# Patient Record
Sex: FEMALE | Race: WHITE | Employment: UNEMPLOYED | URBAN - METROPOLITAN AREA
[De-identification: names, ages, dates, MRNs, and addresses within clinical notes are randomized per-mention and may not be internally consistent; named-entity substitution may affect disease eponyms.]

---

## 2023-02-28 ENCOUNTER — HOSPITAL ENCOUNTER (EMERGENCY)
Facility: HOSPITAL | Age: 43
Discharge: HOME | End: 2023-02-28
Attending: EMERGENCY MEDICINE

## 2023-02-28 ENCOUNTER — APPOINTMENT (EMERGENCY)
Dept: RADIOLOGY | Facility: HOSPITAL | Age: 43
End: 2023-02-28

## 2023-02-28 VITALS
RESPIRATION RATE: 20 BRPM | TEMPERATURE: 99 F | OXYGEN SATURATION: 91 % | SYSTOLIC BLOOD PRESSURE: 158 MMHG | HEART RATE: 107 BPM | DIASTOLIC BLOOD PRESSURE: 82 MMHG

## 2023-02-28 DIAGNOSIS — K52.9 GASTROENTERITIS: Primary | ICD-10-CM

## 2023-02-28 LAB
ALBUMIN SERPL BCP-MCNC: 3.4 G/DL (ref 3.5–5)
ALP SERPL-CCNC: 180 U/L (ref 46–116)
ALT SERPL W P-5'-P-CCNC: 32 U/L (ref 12–78)
ANION GAP SERPL CALCULATED.3IONS-SCNC: 5 MMOL/L (ref 4–13)
AST SERPL W P-5'-P-CCNC: 27 U/L (ref 5–45)
BACTERIA UR QL AUTO: ABNORMAL /HPF
BASOPHILS # BLD AUTO: 0.02 THOUSANDS/ÂΜL (ref 0–0.1)
BASOPHILS NFR BLD AUTO: 0 % (ref 0–1)
BILIRUB SERPL-MCNC: 0.87 MG/DL (ref 0.2–1)
BILIRUB UR QL STRIP: NEGATIVE
BUN SERPL-MCNC: 13 MG/DL (ref 5–25)
CALCIUM ALBUM COR SERPL-MCNC: 9.6 MG/DL (ref 8.3–10.1)
CALCIUM SERPL-MCNC: 9.1 MG/DL (ref 8.3–10.1)
CHLORIDE SERPL-SCNC: 105 MMOL/L (ref 96–108)
CLARITY UR: CLEAR
CO2 SERPL-SCNC: 26 MMOL/L (ref 21–32)
COLOR UR: YELLOW
CREAT SERPL-MCNC: 0.76 MG/DL (ref 0.6–1.3)
EOSINOPHIL # BLD AUTO: 0.05 THOUSAND/ÂΜL (ref 0–0.61)
EOSINOPHIL NFR BLD AUTO: 1 % (ref 0–6)
ERYTHROCYTE [DISTWIDTH] IN BLOOD BY AUTOMATED COUNT: 13.2 % (ref 11.6–15.1)
EXT PREGNANCY TEST URINE: NEGATIVE
EXT. CONTROL: NORMAL
GFR SERPL CREATININE-BSD FRML MDRD: 96 ML/MIN/1.73SQ M
GLUCOSE SERPL-MCNC: 155 MG/DL (ref 65–140)
GLUCOSE UR STRIP-MCNC: NEGATIVE MG/DL
HCT VFR BLD AUTO: 45.2 % (ref 34.8–46.1)
HGB BLD-MCNC: 14.7 G/DL (ref 11.5–15.4)
HGB UR QL STRIP.AUTO: NEGATIVE
IMM GRANULOCYTES # BLD AUTO: 0.02 THOUSAND/UL (ref 0–0.2)
IMM GRANULOCYTES NFR BLD AUTO: 0 % (ref 0–2)
KETONES UR STRIP-MCNC: NEGATIVE MG/DL
LEUKOCYTE ESTERASE UR QL STRIP: NEGATIVE
LIPASE SERPL-CCNC: 74 U/L (ref 73–393)
LYMPHOCYTES # BLD AUTO: 0.64 THOUSANDS/ÂΜL (ref 0.6–4.47)
LYMPHOCYTES NFR BLD AUTO: 8 % (ref 14–44)
MCH RBC QN AUTO: 28.1 PG (ref 26.8–34.3)
MCHC RBC AUTO-ENTMCNC: 32.5 G/DL (ref 31.4–37.4)
MCV RBC AUTO: 86 FL (ref 82–98)
MONOCYTES # BLD AUTO: 0.34 THOUSAND/ÂΜL (ref 0.17–1.22)
MONOCYTES NFR BLD AUTO: 4 % (ref 4–12)
NEUTROPHILS # BLD AUTO: 6.63 THOUSANDS/ÂΜL (ref 1.85–7.62)
NEUTS SEG NFR BLD AUTO: 87 % (ref 43–75)
NITRITE UR QL STRIP: NEGATIVE
NON-SQ EPI CELLS URNS QL MICRO: ABNORMAL /HPF
NRBC BLD AUTO-RTO: 0 /100 WBCS
PH UR STRIP.AUTO: 8 [PH]
PLATELET # BLD AUTO: 288 THOUSANDS/UL (ref 149–390)
PMV BLD AUTO: 9 FL (ref 8.9–12.7)
POTASSIUM SERPL-SCNC: 4.1 MMOL/L (ref 3.5–5.3)
PROT SERPL-MCNC: 7.5 G/DL (ref 6.4–8.4)
PROT UR STRIP-MCNC: ABNORMAL MG/DL
RBC # BLD AUTO: 5.24 MILLION/UL (ref 3.81–5.12)
RBC #/AREA URNS AUTO: ABNORMAL /HPF
SODIUM SERPL-SCNC: 136 MMOL/L (ref 135–147)
SP GR UR STRIP.AUTO: 1.02 (ref 1–1.03)
UROBILINOGEN UR STRIP-ACNC: <2 MG/DL
WBC # BLD AUTO: 7.7 THOUSAND/UL (ref 4.31–10.16)
WBC #/AREA URNS AUTO: ABNORMAL /HPF

## 2023-02-28 RX ORDER — HYDROMORPHONE HCL/PF 1 MG/ML
0.5 SYRINGE (ML) INJECTION ONCE
Status: COMPLETED | OUTPATIENT
Start: 2023-02-28 | End: 2023-02-28

## 2023-02-28 RX ORDER — ONDANSETRON 2 MG/ML
4 INJECTION INTRAMUSCULAR; INTRAVENOUS ONCE
Status: COMPLETED | OUTPATIENT
Start: 2023-02-28 | End: 2023-02-28

## 2023-02-28 RX ADMIN — HYDROMORPHONE HYDROCHLORIDE 0.5 MG: 1 INJECTION, SOLUTION INTRAMUSCULAR; INTRAVENOUS; SUBCUTANEOUS at 04:30

## 2023-02-28 RX ADMIN — ONDANSETRON 4 MG: 2 INJECTION INTRAMUSCULAR; INTRAVENOUS at 06:01

## 2023-02-28 RX ADMIN — SODIUM CHLORIDE 1000 ML: 0.9 INJECTION, SOLUTION INTRAVENOUS at 06:01

## 2023-02-28 RX ADMIN — IOHEXOL 100 ML: 350 INJECTION, SOLUTION INTRAVENOUS at 05:50

## 2023-02-28 RX ADMIN — ONDANSETRON 4 MG: 2 INJECTION INTRAMUSCULAR; INTRAVENOUS at 04:30

## 2023-02-28 NOTE — ED PROVIDER NOTES
History  Chief Complaint   Patient presents with   • Abdominal Pain     Pt reports abdominal pain/back pain that started yesterday  Pt also reports vomiting and burning when urinating  Pt has hx kidney stones and states it feels similar      Is a 59-year-old male with a past medical history of recurrent kidney infections presenting with pain that she says is like her past kidney infections  Noted burning with urination yesterday, that developed into abdominal pain, flank pain, and vomiting today  Patient denies any fevers  She is uncomfortable in the ED  She has had both kidney stones and kidney infections, and states that this pain is more consistent with her history of kidney infection  Not having any chest pain, shortness of breath, altered mental status, diarrhea  Prior to Admission Medications   Prescriptions Last Dose Informant Patient Reported? Taking?    Multiple Vitamin (MULTIVITAMIN) tablet   Yes No   Sig: Take 1 tablet by mouth daily   albuterol (PROVENTIL HFA,VENTOLIN HFA) 90 mcg/act inhaler   No No   Sig: Inhale 2 puffs every 4 (four) hours as needed for wheezing or shortness of breath   albuterol (PROVENTIL HFA,VENTOLIN HFA) 90 mcg/act inhaler   Yes No   Sig: Inhale 2 puffs every 4 (four) hours as needed   amitriptyline (ELAVIL) 150 MG tablet   Yes No   Sig: Take 150 mg by mouth every evening   multivitamin (THERAGRAN) TABS   Yes No   Sig: Take 1 tablet by mouth   ondansetron (ZOFRAN-ODT) 4 mg disintegrating tablet   No No   Sig: Take 1 tablet (4 mg total) by mouth every 6 (six) hours as needed for nausea or vomiting for up to 10 doses   rizatriptan (MAXALT) 5 MG tablet   Yes No   Sig: Take 5 mg by mouth daily as needed   zolpidem (AMBIEN) 5 mg tablet   Yes No   Sig: Take 5 mg by mouth daily at bedtime as needed      Facility-Administered Medications: None       Past Medical History:   Diagnosis Date   • Allergic    • Asthma     seasonal asthma   • Insomnia    • Migraine    • Neuromuscular disorder ([facility] [address] )     RLS with PLMD   • Restless leg syndrome        Past Surgical History:   Procedure Laterality Date   • ADENOIDECTOMY     •  SECTION     • CT EXCISION GANGLION WRIST DORSAL/VOLAR PRIMARY Right 2017    Procedure: WRIST EXCISION OF VOLAR GANGLION CYST ;  Surgeon: Acosta Castro MD;  Location: [facility];  Service: Orthopedics   • TONSILECTOMY AND ADNOIDECTOMY     • TONSILLECTOMY         History reviewed  No pertinent family history  I have reviewed and agree with the history as documented  E-Cigarette/Vaping     E-Cigarette/Vaping Substances     Social History     Tobacco Use   • Smoking status: Never   • Smokeless tobacco: Never   Substance Use Topics   • Alcohol use: No   • Drug use: No        Review of Systems   Constitutional: Negative for chills, fatigue and fever  HENT: Negative for rhinorrhea, sore throat and trouble swallowing  Eyes: Negative for discharge and visual disturbance  Respiratory: Negative for cough and shortness of breath  Cardiovascular: Negative for chest pain and palpitations  Gastrointestinal: Positive for abdominal pain, nausea and vomiting  Negative for constipation and diarrhea  Genitourinary: Positive for dysuria and flank pain  Negative for hematuria  Musculoskeletal: Negative for arthralgias and back pain  Skin: Negative for color change and rash  Neurological: Negative for seizures and syncope  All other systems reviewed and are negative        Physical Exam  ED Triage Vitals   Temperature Pulse Respirations Blood Pressure SpO2   23 0326 23 0326 23 0326 23 0326 23 032   99 °F (37 2 °C) (!) 107 20 158/82 91 %      Temp Source Heart Rate Source Patient Position - Orthostatic VS BP Location FiO2 (%)   23 0326 23 0326 23 0326 23 0326 --   Tympanic Monitor Sitting Right arm       Pain Score       23 0430       9             Orthostatic Vital Signs  Vitals:    23 BP: 158/82   Pulse: (!) 107   Patient Position - Orthostatic VS: Sitting       Physical Exam  Vitals and nursing note reviewed  Constitutional:       General: She is not in acute distress  Appearance: She is well-developed  HENT:      Head: Normocephalic and atraumatic  Eyes:      Conjunctiva/sclera: Conjunctivae normal    Cardiovascular:      Rate and Rhythm: Normal rate and regular rhythm  Heart sounds: No murmur heard  Pulmonary:      Effort: Pulmonary effort is normal  No respiratory distress  Breath sounds: Normal breath sounds  Abdominal:      Palpations: Abdomen is soft  Tenderness: There is abdominal tenderness in the suprapubic area  There is left CVA tenderness  Musculoskeletal:         General: No swelling  Cervical back: Neck supple  Skin:     General: Skin is warm and dry  Capillary Refill: Capillary refill takes less than 2 seconds  Neurological:      Mental Status: She is alert     Psychiatric:         Mood and Affect: Mood normal          ED Medications  Medications   sodium chloride 0 9 % bolus 1,000 mL (1,000 mL Intravenous New Bag 2/28/23 0601)   ondansetron (ZOFRAN) injection 4 mg (4 mg Intravenous Given 2/28/23 0430)   HYDROmorphone (DILAUDID) injection 0 5 mg (0 5 mg Intravenous Given 2/28/23 0430)   ondansetron (ZOFRAN) injection 4 mg (4 mg Intravenous Given 2/28/23 0601)   iohexol (OMNIPAQUE) 350 MG/ML injection (SINGLE-DOSE) 100 mL (100 mL Intravenous Given 2/28/23 0550)       Diagnostic Studies  Results Reviewed     Procedure Component Value Units Date/Time    Lipase [[id number]]  (Normal) Collected: 02/28/23 0429    Lab Status: Final result Specimen: Blood from Arm, Right Updated: 02/28/23 0522     Lipase 74 u/L     Comprehensive metabolic panel [[id number]]  (Abnormal) Collected: 02/28/23 0429    Lab Status: Final result Specimen: Blood from Arm, Right Updated: 02/28/23 0521     Sodium 136 mmol/L      Potassium 4 1 mmol/L      Chloride 105 mmol/L CO2 26 mmol/L      ANION GAP 5 mmol/L      BUN 13 mg/dL      Creatinine 0 76 mg/dL      Glucose 155 mg/dL      Calcium 9 1 mg/dL      Corrected Calcium 9 6 mg/dL      AST 27 U/L      ALT 32 U/L      Alkaline Phosphatase 180 U/L      Total Protein 7 5 g/dL      Albumin 3 4 g/dL      Total Bilirubin 0 87 mg/dL      eGFR 96 ml/min/1 73sq m     Narrative:      National Kidney Disease Foundation guidelines for Chronic Kidney Disease (CKD):   •  Stage 1 with normal or high GFR (GFR > 90 mL/min/1 73 square meters)  •  Stage 2 Mild CKD (GFR = 60-89 mL/min/1 73 square meters)  •  Stage 3A Moderate CKD (GFR = 45-59 mL/min/1 73 square meters)  •  Stage 3B Moderate CKD (GFR = 30-44 mL/min/1 73 square meters)  •  Stage 4 Severe CKD (GFR = 15-29 mL/min/1 73 square meters)  •  Stage 5 End Stage CKD (GFR <15 mL/min/1 73 square meters)  Note: GFR calculation is accurate only with a steady state creatinine    Urine Microscopic [[id number]]  (Abnormal) Collected: 02/28/23 0434    Lab Status: Final result Specimen: Urine, Clean Catch Updated: 02/28/23 0512     RBC, UA 1-2 /hpf      WBC, UA 2-4 /hpf      Epithelial Cells Occasional /hpf      Bacteria, UA None Seen /hpf     UA w Reflex to Microscopic w Reflex to Culture [[id number]]  (Abnormal) Collected: 02/28/23 0434    Lab Status: Final result Specimen: Urine, Clean Catch Updated: 02/28/23 0512     Color, UA Yellow     Clarity, UA Clear     Specific Gravity, UA 1 023     pH, UA 8 0     Leukocytes, UA Negative     Nitrite, UA Negative     Protein, UA Trace mg/dl      Glucose, UA Negative mg/dl      Ketones, UA Negative mg/dl      Urobilinogen, UA <2 0 mg/dl      Bilirubin, UA Negative     Occult Blood, UA Negative    CBC and differential [[id number]]  (Abnormal) Collected: 02/28/23 0429    Lab Status: Final result Specimen: Blood from Arm, Right Updated: 02/28/23 0438     WBC 7 70 Thousand/uL      RBC 5 24 Million/uL      Hemoglobin 14 7 g/dL      Hematocrit 45 2 %      MCV 86 fL MCH 28 1 pg      MCHC 32 5 g/dL      RDW 13 2 %      MPV 9 0 fL      Platelets 624 Thousands/uL      nRBC 0 /100 WBCs      Neutrophils Relative 87 %      Immat GRANS % 0 %      Lymphocytes Relative 8 %      Monocytes Relative 4 %      Eosinophils Relative 1 %      Basophils Relative 0 %      Neutrophils Absolute 6 63 Thousands/µL      Immature Grans Absolute 0 02 Thousand/uL      Lymphocytes Absolute 0 64 Thousands/µL      Monocytes Absolute 0 34 Thousand/µL      Eosinophils Absolute 0 05 Thousand/µL      Basophils Absolute 0 02 Thousands/µL     POCT pregnancy, urine [[id number]]  (Normal) Resulted: 02/28/23 0418    Lab Status: Final result Updated: 02/28/23 0418     EXT Preg Test, Ur Negative     Control Valid                 CT abdomen pelvis w contrast   Final Result by Yahir Abreu MD (02/28 0307)      No evidence of acute abdominopelvic process  3 mm right renal nonobstructing calculus  Additional chronic findings and negatives as above  Workstation performed: [id number]               Procedures  Procedures      ED Course                                       Medical Decision Making  Patient's history highly consistent with any infection  Patient's body habitus limits abdominal exam, but she is diffusely tender, worst suprapubically  Patient states that she has had multiple kidney infections in the past, not her pain today feels like a kidney infection pain  We will perform abdominal labs, urinalysis, and if endings are consistent with nephritis or UTI discharge patient on antibiotics  Findings inconsistent with nephritis or UTI, will consider CAT scan  Laboratory analysis returned consistent with UTI or pyelonephritis  Given patient's continued abdominal discomfort we will obtain a CAT scan to evaluate for the cause of her pain  CT scan returned unremarkable, as is the rest of her workup   Patient states that she feels better after zofran and dilaudid and would like to return home, and come back to the ED if her symptoms worsen  Encouraged follow up with urology and her PCP, and gave return precautions  Gastroenteritis: self-limited or minor problem  Amount and/or Complexity of Data Reviewed  Labs: ordered  Radiology: ordered  Risk  Prescription drug management  Disposition  Final diagnoses:   Gastroenteritis     Time reflects when diagnosis was documented in both MDM as applicable and the Disposition within this note     Time User Action Codes Description Comment    2/28/2023  6:18 AM Alexis Lira Add [K52 9] Gastroenteritis       ED Disposition     ED Disposition   Discharge    Condition   Stable    Date/Time   Tue Feb 28, 2023  6:18 AM    Comment   Luis Dangelo discharge to home/self care  Follow-up Information     Follow up With Specialties Details Why Contact Info Additional Information    Garrick Vasquez,  Internal Medicine   [address] [address] [address] [facility] Emergency Department Emergency Medicine Go to  If symptoms worsen [address] [id number]  [address] [place] [address] Emergency Department, [address] [place] [address], [id number] [phone]          Patient's Medications   Discharge Prescriptions    No medications on file     No discharge procedures on file  PDMP Review     None           ED Provider  Attending physically available and evaluated Luis Dangelo  I managed the patient along with the ED Attending      Electronically Signed by         Romain Chavez MD  02/28/23 5770

## 2023-02-28 NOTE — ED ATTENDING ATTESTATION
2/28/2023  ISadaf MD, saw and evaluated the patient  I have discussed the patient with the resident/non-physician practitioner and agree with the resident's/non-physician practitioner's findings, Plan of Care, and MDM as documented in the resident's/non-physician practitioner's note, except where noted  All available labs and Radiology studies were reviewed  I was present for key portions of any procedure(s) performed by the resident/non-physician practitioner and I was immediately available to provide assistance  At this point I agree with the current assessment done in the Emergency Department  I have conducted an independent evaluation of this patient a history and physical is as follows:    ED Course     Emergency Department Note- Ervin Smith 36 y o  female MRN: [id number]    Unit/Bed#: CRB Encounter: [id number]    Ervin Smith is a 36 y o  female who presents with   Chief Complaint   Patient presents with   • Abdominal Pain     Pt reports abdominal pain/back pain that started yesterday  Pt also reports vomiting and burning when urinating  Pt has hx kidney stones and states it feels similar          History of Present Illness   HPI:  Ervin Smith is a 36 y o  female who presents for evaluation of:  Dysuria and suprapubic discomfort that is typical of her urinary infections  Patient notes associated nausea and vomiting  The dysuria she describes as a burning sensation when she urinates  Patient does have a history of ureteral colic, however, her her symptoms tonight are more reminiscent of urinary infection  Review of Systems   Constitutional: Negative for fatigue and fever  HENT: Negative for congestion and sore throat  Respiratory: Negative for cough and shortness of breath  Cardiovascular: Negative for chest pain and palpitations  Gastrointestinal: Positive for abdominal pain and nausea  Genitourinary: Positive for dysuria and urgency   Negative for flank pain and frequency  Neurological: Negative for light-headedness and headaches  Psychiatric/Behavioral: Negative for dysphoric mood and hallucinations  All other systems reviewed and are negative  Historical Information   Past Medical History:   Diagnosis Date   • Allergic    • Asthma     seasonal asthma   • Insomnia    • Migraine    • Neuromuscular disorder (H[facility])     RLS with PLMD   • Restless leg syndrome      Past Surgical History:   Procedure Laterality Date   • ADENOIDECTOMY     •  SECTION     • [place] EXCISION GANGLION WRIST DORSAL/VOLAR PRIMARY Right 2017    Procedure: WRIST EXCISION OF VOLAR GANGLION CYST ;  Surgeon: Celestine Varner MD;  [facility];  Service: Orthopedics   • TONSILECTOMY AND ADNOIDECTOMY     • TONSILLECTOMY       Social History   Social History     Substance and Sexual Activity   Alcohol Use No     Social History     Substance and Sexual Activity   Drug Use No     Social History     Tobacco Use   Smoking Status Never   Smokeless Tobacco Never     Family History: History reviewed  No pertinent family history  Meds/Allergies   PTA meds:   Prior to Admission Medications   Prescriptions Last Dose Informant Patient Reported? Taking?    Multiple Vitamin (MULTIVITAMIN) tablet   Yes No   Sig: Take 1 tablet by mouth daily   albuterol (PROVENTIL HFA,VENTOLIN HFA) 90 mcg/act inhaler   No No   Sig: Inhale 2 puffs every 4 (four) hours as needed for wheezing or shortness of breath   albuterol (PROVENTIL HFA,VENTOLIN HFA) 90 mcg/act inhaler   Yes No   Sig: Inhale 2 puffs every 4 (four) hours as needed   amitriptyline (ELAVIL) 150 MG tablet   Yes No   Sig: Take 150 mg by mouth every evening   multivitamin (THERAGRAN) TABS   Yes No   Sig: Take 1 tablet by mouth   ondansetron (ZOFRAN-ODT) 4 mg disintegrating tablet   No No   Sig: Take 1 tablet (4 mg total) by mouth every 6 (six) hours as needed for nausea or vomiting for up to 10 doses   rizatriptan (MAXALT) 5 MG tablet   Yes No   Sig: Take 5 mg by mouth daily as needed   zolpidem (AMBIEN) 5 mg tablet   Yes No   Sig: Take 5 mg by mouth daily at bedtime as needed      Facility-Administered Medications: None     Allergies   Allergen Reactions   • Aspirin Hives     Other reaction(s): hives , shaking   • Ibuprofen Anaphylaxis   • Nsaids Anaphylaxis   • Penicillins Hives     Hives and high fever   • Reglan [Metoclopramide] Other (See Comments)     Patient reports she passes out and blood pressure raises severely within moments   • Robitussin (Alcohol Free) [Guaifenesin] GI Intolerance and Vomiting   • Other Rash     Strawberries cause rash  Aloe Vera - red, burning skin with blisters       Objective   First Vitals:   Blood Pressure: 158/82 (02/28/23 0326)  Pulse: (!) 107 (02/28/23 0326)  Temperature: 99 °F (37 2 °C) (02/28/23 0326)  Temp Source: Tympanic (02/28/23 0326)  Respirations: 20 (02/28/23 0326)  SpO2: 91 % (02/28/23 0326)    Current Vitals:   Blood Pressure: 158/82 (02/28/23 0326)  Pulse: (!) 107 (02/28/23 0326)  Temperature: 99 °F (37 2 °C) (02/28/23 0326)  Temp Source: Tympanic (02/28/23 0326)  Respirations: 20 (02/28/23 0326)  SpO2: 91 % (02/28/23 0326)    No intake or output data in the 24 hours ending 02/28/23 0420    Invasive Devices     None                 Physical Exam  Vitals and nursing note reviewed  Constitutional:       General: She is not in acute distress  Appearance: Normal appearance  She is well-developed  HENT:      Head: Normocephalic and atraumatic  Right Ear: External ear normal       Left Ear: External ear normal       Nose: Nose normal       Mouth/Throat:      Pharynx: No oropharyngeal exudate  Eyes:      Conjunctiva/sclera: Conjunctivae normal       Pupils: Pupils are equal, round, and reactive to light  Cardiovascular:      Rate and Rhythm: Normal rate and regular rhythm  Pulmonary:      Effort: Pulmonary effort is normal  No respiratory distress  Abdominal:      General: Abdomen is flat   There is no distension  Palpations: Abdomen is soft  Musculoskeletal:         General: No deformity  Normal range of motion  Cervical back: Normal range of motion and neck supple  Skin:     General: Skin is warm and dry  Capillary Refill: Capillary refill takes less than 2 seconds  Neurological:      General: No focal deficit present  Mental Status: She is alert and oriented to person, place, and time  Mental status is at baseline  Coordination: Coordination normal    Psychiatric:         Mood and Affect: Mood normal          Behavior: Behavior normal          Thought Content: Thought content normal          Judgment: Judgment normal            Medical Decision Makin  Acute dysuria: Urinalysis; urine hCG rule out pregnancy; pain control  Recent Results (from the past 36 hour(s))   POCT pregnancy, urine    Collection Time: 23  4:18 AM   Result Value Ref Range    EXT Preg Test, Ur Negative     Control Valid      No orders to display         Portions of the record may have been created with voice recognition software  Occasional wrong word or "sound a like" substitutions may have occurred due to the inherent limitations of voice recognition software  Read the chart carefully and recognize, using context, where substitutions have occurred          Critical Care Time  Procedures

## 2023-02-28 NOTE — DISCHARGE INSTRUCTIONS
You have been evaluated in the Emergency Department today for nausea and abdominal pain  Your evaluation, including CT of the abdomen, suggests that your symptoms are due to gastroenteritis  Please follow up with your primary care physician within two days  Return to the Emergency Department if you experience worsening of your symptoms  Thank you for choosing us for your care

## 2023-03-03 ENCOUNTER — OFFICE VISIT (OUTPATIENT)
Dept: INTERNAL MEDICINE CLINIC | Facility: CLINIC | Age: 43
End: 2023-03-03

## 2023-03-03 VITALS
SYSTOLIC BLOOD PRESSURE: 140 MMHG | HEART RATE: 100 BPM | WEIGHT: 293 LBS | TEMPERATURE: 97.5 F | HEIGHT: 63 IN | DIASTOLIC BLOOD PRESSURE: 82 MMHG | BODY MASS INDEX: 51.91 KG/M2 | RESPIRATION RATE: 18 BRPM | OXYGEN SATURATION: 96 %

## 2023-03-03 DIAGNOSIS — F43.10 PTSD (POST-TRAUMATIC STRESS DISORDER): ICD-10-CM

## 2023-03-03 DIAGNOSIS — G43.709 CHRONIC MIGRAINE WITHOUT AURA WITHOUT STATUS MIGRAINOSUS, NOT INTRACTABLE: ICD-10-CM

## 2023-03-03 DIAGNOSIS — E66.01 CLASS 3 SEVERE OBESITY DUE TO EXCESS CALORIES WITHOUT SERIOUS COMORBIDITY WITH BODY MASS INDEX (BMI) OF 50.0 TO 59.9 IN ADULT (HCC): ICD-10-CM

## 2023-03-03 DIAGNOSIS — R74.8 ELEVATED ALKALINE PHOSPHATASE LEVEL: Primary | ICD-10-CM

## 2023-03-03 DIAGNOSIS — R73.03 PREDIABETES: ICD-10-CM

## 2023-03-03 DIAGNOSIS — F41.9 ANXIETY AND DEPRESSION: ICD-10-CM

## 2023-03-03 DIAGNOSIS — F51.01 PRIMARY INSOMNIA: ICD-10-CM

## 2023-03-03 DIAGNOSIS — F32.A ANXIETY AND DEPRESSION: ICD-10-CM

## 2023-03-03 RX ORDER — AMITRIPTYLINE HYDROCHLORIDE 50 MG/1
75 TABLET, FILM COATED ORAL EVERY EVENING
Qty: 135 TABLET | Refills: 0 | Status: SHIPPED | OUTPATIENT
Start: 2023-03-03 | End: 2023-03-17

## 2023-03-03 RX ORDER — ZOLPIDEM TARTRATE 5 MG/1
5 TABLET ORAL
Qty: 30 TABLET | Refills: 0 | Status: SHIPPED | OUTPATIENT
Start: 2023-03-03
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
## 2023-03-05 PROBLEM — F41.9 ANXIETY AND DEPRESSION: Status: ACTIVE | Noted: 2023-03-05

## 2023-03-05 PROBLEM — R73.03 PREDIABETES: Status: ACTIVE | Noted: 2023-03-05

## 2023-03-05 PROBLEM — F32.A ANXIETY AND DEPRESSION: Status: ACTIVE | Noted: 2023-03-05

## 2023-03-05 PROBLEM — R74.8 ELEVATED ALKALINE PHOSPHATASE LEVEL: Status: ACTIVE | Noted: 2023-03-05

## 2023-03-05 PROBLEM — E66.01 CLASS 3 SEVERE OBESITY DUE TO EXCESS CALORIES WITHOUT SERIOUS COMORBIDITY WITH BODY MASS INDEX (BMI) OF 50.0 TO 59.9 IN ADULT (HCC): Status: ACTIVE | Noted: 2023-03-05

## 2023-03-05 PROBLEM — E66.813 CLASS 3 SEVERE OBESITY DUE TO EXCESS CALORIES WITHOUT SERIOUS COMORBIDITY WITH BODY MASS INDEX (BMI) OF 50.0 TO 59.9 IN ADULT (HCC): Status: ACTIVE | Noted: 2023-03-05

## 2023-03-05 PROBLEM — F51.01 PRIMARY INSOMNIA: Status: ACTIVE | Noted: 2023-03-05

## 2023-03-05 NOTE — ASSESSMENT & PLAN NOTE
Maintained on amitriptyline, as needed Ambien  She has been trying to wean down her dose of Ambien as tolerated and is currently using maybe 2 or 3 times a week    She would like to continue to wean which I believe is a good idea  -We will increase amitriptyline to 75 mg at bedtime, advised patient to try to continue to wean Ambien as tolerated

## 2023-03-05 NOTE — ASSESSMENT & PLAN NOTE
Chronic, mild elevation in alkaline phosphatase  Transaminases, bilirubin within normal limits     Right upper quadrant ultrasound completed November 2022 -no pathology seen, no cholelithiasis, enlarged echodense liver compatible with fatty infiltration, no significant ductal dilation seen    Cause of this is unclear, we will continue to periodically monitor
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
## 2023-03-05 NOTE — ASSESSMENT & PLAN NOTE
A1c 6 0 on most recent set of labs February 2023, improved from prior  We will continue to monitor    Healthy dietary and lifestyle choices are encouraged

## 2023-03-05 NOTE — ASSESSMENT & PLAN NOTE
Migraine frequency has reduced over time, currently occur 2 or 3 times a month  She has prescription for Maxalt as abortive  She is on amitriptyline as a prophylactic therapy    She seems satisfied with her current degree of migraine control

## 2023-03-06 ENCOUNTER — OFFICE VISIT (OUTPATIENT)
Dept: INTERNAL MEDICINE CLINIC | Facility: CLINIC | Age: 43
End: 2023-03-06

## 2023-03-06 VITALS
HEIGHT: 63 IN | BODY MASS INDEX: 51.91 KG/M2 | TEMPERATURE: 98.5 F | OXYGEN SATURATION: 96 % | DIASTOLIC BLOOD PRESSURE: 86 MMHG | SYSTOLIC BLOOD PRESSURE: 136 MMHG | RESPIRATION RATE: 20 BRPM | HEART RATE: 60 BPM | WEIGHT: 293 LBS

## 2023-03-06 DIAGNOSIS — H60.502 ACUTE OTITIS EXTERNA OF LEFT EAR, UNSPECIFIED TYPE: Primary | ICD-10-CM

## 2023-03-06 RX ORDER — CIPROFLOXACIN AND DEXAMETHASONE 3; 1 MG/ML; MG/ML
4 SUSPENSION/ DROPS AURICULAR (OTIC) 2 TIMES DAILY
Qty: 7.5 ML | Refills: 0 | Status: SHIPPED | OUTPATIENT
Start: 2023-03-06 | End: 2023-03-13

## 2023-03-06 RX ORDER — AMITRIPTYLINE HYDROCHLORIDE 75 MG/1
75 TABLET, FILM COATED ORAL EVERY EVENING
COMMUNITY
Start: 2023-03-03 | End: 2023-03-17

## 2023-03-06 NOTE — PROGRESS NOTES
INTERNAL MEDICINE OFFICE VISIT  [facility] Internal Medicine- [place]    NAME: Dimple Beckwith  AGE: 36 y o  SEX: female    DATE OF ENCOUNTER: 3/6/2023    Assessment and Plan/History of Present Illness     Here today for same-day appointment for ear pain  Medical history of hypertension, anxiety/depression/PTSD, migraine, prediabetes    Approximately 2-day history of left-sided ear pain  Issa/pounding in quality  She has associated muffling of her hearing  She states she had a similar episode approximately 2 weeks ago  She has noticed some wax coming out of the ear but no otorrhea or bleeding  It "feels like an ocean" in her ear  She denies any associated dizziness or balance issues  No coughing, sinus congestion, or other upper respiratory issues    On exam, she has some mild tenderness to palpation with manipulation of the pinna and pressing of the tragus  Small amount of wax in the 12 o'clock position, there is some erythema of the canal, no obvious effusion or evidence of otitis media on exam   No erythema of the mastoid area    1  Acute otitis externa of left ear, unspecified type  -We will treat for possible otitis externa with course of Ciprodex  Advised her to let me know if her symptoms do not improve    - ciprofloxacin-dexamethasone (CIPRODEX) otic suspension; Administer 4 drops into the left ear 2 (two) times a day for 7 days  Dispense: 7 5 mL; Refill: 0          No orders of the defined types were placed in this encounter        Chief Complaint     Chief Complaint   Patient presents with   • Earache     Left ear pain, started 2 days ago, pt now experiences muffled sound, pt took tylenol which did not help        Review of Systems     10 point ROS negative except per HPI    The following portions of the patient's history were reviewed and updated as appropriate: allergies, current medications, past family history, past medical history, past social history, past surgical history and problem list     Active Problem List     Patient Active Problem List   Diagnosis   • Acute sinusitis   • Acute bacterial conjunctivitis of both eyes   • Anxiety and depression   • Primary insomnia   • Prediabetes   • Elevated alkaline phosphatase level   • Class 3 severe obesity due to excess calories without serious comorbidity with body mass index (BMI) of 50 0 to 59 9 in adult [facility])   • Migraine   • PTSD (post-traumatic stress disorder)       Objective     /86 (BP Location: Left arm, Patient Position: Sitting, Cuff Size: Large)   Pulse 60   Temp 98 5 °F (36 9 °C)   Resp 20   Ht 5' 3" (1 6 m)   Wt (!) 141 kg (311 lb)   LMP  (LMP Unknown) Comment: irregular periods  SpO2 96%   BMI 55 09 kg/m²     Physical Exam  Constitutional:       Appearance: Normal appearance  She is not ill-appearing  HENT:      Head: Normocephalic and atraumatic  Eyes:      General: No scleral icterus  Right eye: No discharge  Left eye: No discharge  Cardiovascular:      Rate and Rhythm: Normal rate and regular rhythm  Heart sounds: No murmur heard  No friction rub  Pulmonary:      Effort: Pulmonary effort is normal       Breath sounds: Normal breath sounds  No wheezing or rales  Abdominal:      General: Abdomen is flat  There is no distension  Palpations: Abdomen is soft  Tenderness: There is no abdominal tenderness  Musculoskeletal:         General: No swelling or tenderness  Skin:     General: Skin is warm and dry  Findings: No erythema  Neurological:      Mental Status: She is alert  Mental status is at baseline  Motor: No weakness  Psychiatric:         Mood and Affect: Mood normal          Behavior: Behavior normal          Pertinent Laboratory/Diagnostic Studies:  CT abdomen pelvis w contrast    Result Date: 2/28/2023  Impression: No evidence of acute abdominopelvic process  3 mm right renal nonobstructing calculus  Additional chronic findings and negatives as above  Workstation performed: [id number]       Images and diagnostics reviewed     Current Medications     Current Outpatient Medications:   •  albuterol (PROVENTIL HFA,VENTOLIN HFA) 90 mcg/act inhaler, Inhale 2 puffs every 4 (four) hours as needed, Disp: , Rfl:   •  amitriptyline (ELAVIL) 75 mg tablet, Take 75 mg by mouth every evening, Disp: , Rfl:   •  ciprofloxacin-dexamethasone (CIPRODEX) otic suspension, Administer 4 drops into the left ear 2 (two) times a day for 7 days, Disp: 7 5 mL, Rfl: 0  •  Multiple Vitamin (MULTIVITAMIN) tablet, Take 1 tablet by mouth daily, Disp: , Rfl:   •  multivitamin (THERAGRAN) TABS, Take 1 tablet by mouth, Disp: , Rfl:   •  ondansetron (ZOFRAN-ODT) 4 mg disintegrating tablet, Take 1 tablet (4 mg total) by mouth every 6 (six) hours as needed for nausea or vomiting for up to 10 doses, Disp: 10 tablet, Rfl: 0  •  rizatriptan (MAXALT) 5 MG tablet, Take 5 mg by mouth daily as needed, Disp: , Rfl:   •  zolpidem (AMBIEN) 5 mg tablet, Take 1 tablet (5 mg total) by mouth daily at bedtime as needed for sleep, Disp: 30 tablet, Rfl: 0  •  amitriptyline (ELAVIL) 50 mg tablet, Take 1 5 tablets (75 mg total) by mouth every evening (Patient not taking: Reported on 3/6/2023), Disp: 135 tablet, Rfl: 0    Health Maintenance     Health Maintenance   Topic Date Due   • Hepatitis C Screening  Never done   • COVID-19 Vaccine (1) Never done   • HIV Screening  Never done   • Annual Physical  Never done   • Cervical Cancer Screening  Never done   • Breast Cancer Screening: Mammogram  Never done   • Influenza Vaccine (1) 06/30/2023 (Originally 9/1/2022)   • BMI: Followup Plan  03/03/2024   • BMI: Adult  03/03/2024   • DTaP,Tdap,and Td Vaccines (2 - Td or Tdap) 12/25/2024   • Pneumococcal Vaccine: Pediatrics (0 to 5 Years) and At-Risk Patients (6 to 59 Years)  Aged Out   • HIB Vaccine  Aged Out   • IPV Vaccine  Aged Out   • Hepatitis A Vaccine  Aged Out   • Meningococcal ACWY Vaccine  Aged Out   • HPV Vaccine Aged [employer] History   Administered Date(s) Administered   • INFLUENZA 04/29/2020, 12/07/2021   • Tdap 12/25/2014       CHERELLE Jules [facility] Internal Medicine [place] [address] [address], [facility] [address]  [place], [address] [facility]  Office: [phone]  Fax: [phone]

## 2023-03-17 ENCOUNTER — OFFICE VISIT (OUTPATIENT)
Dept: INTERNAL MEDICINE CLINIC | Facility: CLINIC | Age: 43
End: 2023-03-17

## 2023-03-17 VITALS
WEIGHT: 293 LBS | BODY MASS INDEX: 51.91 KG/M2 | TEMPERATURE: 97.6 F | HEIGHT: 63 IN | DIASTOLIC BLOOD PRESSURE: 98 MMHG | RESPIRATION RATE: 13 BRPM | HEART RATE: 86 BPM | SYSTOLIC BLOOD PRESSURE: 140 MMHG

## 2023-03-17 DIAGNOSIS — F41.9 ACUTE ANXIETY: ICD-10-CM

## 2023-03-17 DIAGNOSIS — F41.9 ANXIETY AND DEPRESSION: ICD-10-CM

## 2023-03-17 DIAGNOSIS — F51.01 PRIMARY INSOMNIA: Primary | ICD-10-CM

## 2023-03-17 DIAGNOSIS — F32.A ANXIETY AND DEPRESSION: ICD-10-CM

## 2023-03-17 DIAGNOSIS — I10 ESSENTIAL HYPERTENSION: ICD-10-CM

## 2023-03-17 RX ORDER — AMITRIPTYLINE HYDROCHLORIDE 75 MG/1
75 TABLET, FILM COATED ORAL
Qty: 90 TABLET | Refills: 1 | Status: SHIPPED | OUTPATIENT
Start: 2023-03-17

## 2023-03-17 RX ORDER — OLMESARTAN MEDOXOMIL 5 MG/1
5 TABLET ORAL DAILY
Qty: 90 TABLET | Refills: 0 | Status: SHIPPED | OUTPATIENT
Start: 2023-03-17

## 2023-03-17 RX ORDER — LORAZEPAM 0.5 MG/1
0.5 TABLET ORAL DAILY PRN
Qty: 2 TABLET | Refills: 0 | Status: SHIPPED | OUTPATIENT
Start: 2023-03-17

## 2023-03-17 NOTE — ASSESSMENT & PLAN NOTE
-Seems to have had satisfactory response with increase in amitriptyline dose to 75 mg  She has only required Ambien once since our last appointment  -Continue amitriptyline    Recommend Ambien to be used sparingly as needed moving forward

## 2023-03-17 NOTE — ASSESSMENT & PLAN NOTE
Patient has significant anxiety with blood draws  Has been premedicated in the past   We will send prescription for lorazepam 0 5 mg to be taken 30 to 60 minutes prior to lab draw    Patient's spouse will drive to lab

## 2023-03-17 NOTE — ASSESSMENT & PLAN NOTE
Appears patient may have been on lisinopril per chart review  She has been tracking BP at home  Systolic BP generally ranging from the high 130s with a few readings in the 140s to 150s  Diastolic readings ranging from the 50s to 80s generally  -We will start low-dose Benicar 5 mg daily    Recheck blood work in approximately 1 month prior to our next follow-up visit

## 2023-03-17 NOTE — PROGRESS NOTES
INTERNAL MEDICINE OFFICE VISIT  [facility] Internal Medicine- [place]    NAME: Bianca Barajas  AGE: 39 y o  SEX: female    DATE OF ENCOUNTER: 3/17/2023    Assessment and Plan/History of Present Illness     Here today for 2-week follow-up  Medical history of hypertension, anxiety/depression/PTSD, insomnia, migraine, prediabetes    1  Primary insomnia  Assessment & Plan:  -Seems to have had satisfactory response with increase in amitriptyline dose to 75 mg  She has only required Ambien once since our last appointment  -Continue amitriptyline  Recommend Ambien to be used sparingly as needed moving forward    Orders:  -     amitriptyline (ELAVIL) 75 mg tablet; Take 1 tablet (75 mg total) by mouth daily at bedtime    2  Anxiety and depression  Assessment & Plan:  Continue amitriptyline 75 mg daily at bedtime      Orders:  -     amitriptyline (ELAVIL) 75 mg tablet; Take 1 tablet (75 mg total) by mouth daily at bedtime    3  Essential hypertension  Assessment & Plan:  Appears patient may have been on lisinopril per chart review  She has been tracking BP at home  Systolic BP generally ranging from the high 130s with a few readings in the 140s to 150s  Diastolic readings ranging from the 50s to 80s generally  -We will start low-dose Benicar 5 mg daily  Recheck blood work in approximately 1 month prior to our next follow-up visit    Orders:  -     olmesartan (BENICAR) 5 mg tablet; Take 1 tablet (5 mg total) by mouth daily    4  Acute anxiety  Assessment & Plan:  Patient has significant anxiety with blood draws  Has been premedicated in the past   We will send prescription for lorazepam 0 5 mg to be taken 30 to 60 minutes prior to lab draw  Patient's spouse will drive to lab    Orders:  -     LORazepam (Ativan) 0 5 mg tablet;  Take 1 tablet (0 5 mg total) by mouth daily as needed for anxiety Take 1 tablet 30 to 60 minutes prior to lab draw               No orders of the defined types were placed in this encounter  Chief Complaint     Chief Complaint   Patient presents with   • Follow-up     Needs pap (ordered)  Appt for mammo next week       Review of Systems     10 point ROS negative except per HPI    The following portions of the patient's history were reviewed and updated as appropriate: allergies, current medications, past family history, past medical history, past social history, past surgical history and problem list     Active Problem List     Patient Active Problem List   Diagnosis   • Acute sinusitis   • Acute bacterial conjunctivitis of both eyes   • Anxiety and depression   • Primary insomnia   • Prediabetes   • Elevated alkaline phosphatase level   • Class 3 severe obesity due to excess calories without serious comorbidity with body mass index (BMI) of 50 0 to 59 9 in adult [facility])   • Migraine   • PTSD (post-traumatic stress disorder)   • Essential hypertension   • Acute anxiety       Objective     /98 (BP Location: Left arm, Patient Position: Sitting, Cuff Size: Large)   Pulse 86   Temp 97 6 °F (36 4 °C)   Resp 13   Ht 5' 3" (1 6 m)   Wt (!) 142 kg (313 lb)   LMP  (LMP Unknown) Comment: irregular periods  BMI 55 45 kg/m²     Physical Exam  Constitutional:       Appearance: Normal appearance  She is not ill-appearing  HENT:      Head: Normocephalic and atraumatic  Eyes:      General: No scleral icterus  Right eye: No discharge  Left eye: No discharge  Cardiovascular:      Rate and Rhythm: Normal rate and regular rhythm  Heart sounds: No murmur heard  No friction rub  Pulmonary:      Effort: Pulmonary effort is normal       Breath sounds: Normal breath sounds  No wheezing or rales  Abdominal:      General: Abdomen is flat  There is no distension  Palpations: Abdomen is soft  Tenderness: There is no abdominal tenderness  Musculoskeletal:         General: No swelling or tenderness  Skin:     General: Skin is warm and dry        Findings: No erythema  Neurological:      Mental Status: She is alert  Mental status is at baseline  Motor: No weakness  Psychiatric:         Mood and Affect: Mood normal          Behavior: Behavior normal          Pertinent Laboratory/Diagnostic Studies:  CT abdomen pelvis w contrast    Result Date: 2/28/2023  Impression: No evidence of acute abdominopelvic process  3 mm right renal nonobstructing calculus  Additional chronic findings and negatives as above   Workstation performed: [id number]       Images and diagnostics reviewed     Current Medications     Current Outpatient Medications:   •  albuterol (PROVENTIL HFA,VENTOLIN HFA) 90 mcg/act inhaler, Inhale 2 puffs every 4 (four) hours as needed, Disp: , Rfl:   •  amitriptyline (ELAVIL) 75 mg tablet, Take 1 tablet (75 mg total) by mouth daily at bedtime, Disp: 90 tablet, Rfl: 1  •  LORazepam (Ativan) 0 5 mg tablet, Take 1 tablet (0 5 mg total) by mouth daily as needed for anxiety Take 1 tablet 30 to 60 minutes prior to lab draw, Disp: 2 tablet, Rfl: 0  •  Multiple Vitamin (MULTIVITAMIN) tablet, Take 1 tablet by mouth daily, Disp: , Rfl:   •  multivitamin (THERAGRAN) TABS, Take 1 tablet by mouth, Disp: , Rfl:   •  olmesartan (BENICAR) 5 mg tablet, Take 1 tablet (5 mg total) by mouth daily, Disp: 90 tablet, Rfl: 0  •  ondansetron (ZOFRAN-ODT) 4 mg disintegrating tablet, Take 1 tablet (4 mg total) by mouth every 6 (six) hours as needed for nausea or vomiting for up to 10 doses, Disp: 10 tablet, Rfl: 0  •  ciprofloxacin-dexamethasone (CIPRODEX) otic suspension, Administer 4 drops into the left ear 2 (two) times a day for 7 days, Disp: 7 5 mL, Rfl: 0  •  rizatriptan (MAXALT) 5 MG tablet, Take 5 mg by mouth daily as needed, Disp: , Rfl:   •  zolpidem (AMBIEN) 5 mg tablet, Take 1 tablet (5 mg total) by mouth daily at bedtime as needed for sleep, Disp: 30 tablet, Rfl: 0    Health Maintenance     Health Maintenance   Topic Date Due   • Hepatitis C Screening  Never done   • COVID-19 Vaccine (1) Never done   • HIV Screening  Never done   • Annual Physical  Never done   • Cervical Cancer Screening  Never done   • Breast Cancer Screening: Mammogram  Never done   • Influenza Vaccine (1) 06/30/2023 (Originally 9/1/2022)   • BMI: Followup Plan  03/03/2024   • BMI: Adult  03/17/2024   • DTaP,Tdap,and Td Vaccines (2 - Td or Tdap) 12/25/2024   • Pneumococcal Vaccine: Pediatrics (0 to 5 Years) and At-Risk Patients (6 to 59 Years)  Aged Out   • HIB Vaccine  Aged Out   • IPV Vaccine  Aged Out   • Hepatitis A Vaccine  Aged Out   • Meningococcal ACWY Vaccine  Aged Out   • HPV Vaccine  Aged [employer] History   Administered Date(s) Administered   • INFLUENZA 04/29/2020, 12/07/2021   • Tdap 12/25/2014       CHERELLE Modi  Internal Medicine [address], [facility] [address]  [place], [address] [facility]  Office: [phone]  Fax: [phone]

## 2023-03-31 ENCOUNTER — APPOINTMENT (OUTPATIENT)
Dept: LAB | Facility: HOSPITAL | Age: 43
End: 2023-03-31

## 2023-03-31 DIAGNOSIS — R73.03 PREDIABETES: ICD-10-CM

## 2023-03-31 DIAGNOSIS — E66.01 CLASS 3 SEVERE OBESITY DUE TO EXCESS CALORIES WITHOUT SERIOUS COMORBIDITY WITH BODY MASS INDEX (BMI) OF 50.0 TO 59.9 IN ADULT (HCC): ICD-10-CM

## 2023-03-31 DIAGNOSIS — R74.8 ELEVATED ALKALINE PHOSPHATASE LEVEL: ICD-10-CM

## 2023-03-31 LAB
ALBUMIN SERPL BCP-MCNC: 4.1 G/DL (ref 3.5–5)
ALP SERPL-CCNC: 169 U/L (ref 34–104)
ALT SERPL W P-5'-P-CCNC: 22 U/L (ref 7–52)
ANION GAP SERPL CALCULATED.3IONS-SCNC: 9 MMOL/L (ref 4–13)
AST SERPL W P-5'-P-CCNC: 17 U/L (ref 13–39)
BASOPHILS # BLD AUTO: 0.04 THOUSANDS/ÂΜL (ref 0–0.1)
BASOPHILS NFR BLD AUTO: 1 % (ref 0–1)
BILIRUB DIRECT SERPL-MCNC: 0.15 MG/DL (ref 0–0.2)
BILIRUB SERPL-MCNC: 1.06 MG/DL (ref 0.2–1)
BUN SERPL-MCNC: 9 MG/DL (ref 5–25)
CALCIUM SERPL-MCNC: 9.4 MG/DL (ref 8.4–10.2)
CHLORIDE SERPL-SCNC: 102 MMOL/L (ref 96–108)
CHOLEST SERPL-MCNC: 182 MG/DL
CO2 SERPL-SCNC: 27 MMOL/L (ref 21–32)
CREAT SERPL-MCNC: 0.56 MG/DL (ref 0.6–1.3)
EOSINOPHIL # BLD AUTO: 0.18 THOUSAND/ÂΜL (ref 0–0.61)
EOSINOPHIL NFR BLD AUTO: 2 % (ref 0–6)
ERYTHROCYTE [DISTWIDTH] IN BLOOD BY AUTOMATED COUNT: 13.4 % (ref 11.6–15.1)
GFR SERPL CREATININE-BSD FRML MDRD: 113 ML/MIN/1.73SQ M
GGT SERPL-CCNC: 56 U/L (ref 5–85)
GLUCOSE P FAST SERPL-MCNC: 120 MG/DL (ref 65–99)
HCT VFR BLD AUTO: 46.3 % (ref 34.8–46.1)
HDLC SERPL-MCNC: 48 MG/DL
HGB BLD-MCNC: 14.7 G/DL (ref 11.5–15.4)
IMM GRANULOCYTES # BLD AUTO: 0.05 THOUSAND/UL (ref 0–0.2)
IMM GRANULOCYTES NFR BLD AUTO: 1 % (ref 0–2)
LDLC SERPL CALC-MCNC: 115 MG/DL (ref 0–100)
LYMPHOCYTES # BLD AUTO: 2.07 THOUSANDS/ÂΜL (ref 0.6–4.47)
LYMPHOCYTES NFR BLD AUTO: 25 % (ref 14–44)
MCH RBC QN AUTO: 27.8 PG (ref 26.8–34.3)
MCHC RBC AUTO-ENTMCNC: 31.7 G/DL (ref 31.4–37.4)
MCV RBC AUTO: 88 FL (ref 82–98)
MONOCYTES # BLD AUTO: 0.48 THOUSAND/ÂΜL (ref 0.17–1.22)
MONOCYTES NFR BLD AUTO: 6 % (ref 4–12)
NEUTROPHILS # BLD AUTO: 5.45 THOUSANDS/ÂΜL (ref 1.85–7.62)
NEUTS SEG NFR BLD AUTO: 65 % (ref 43–75)
NONHDLC SERPL-MCNC: 134 MG/DL
NRBC BLD AUTO-RTO: 0 /100 WBCS
PLATELET # BLD AUTO: 294 THOUSANDS/UL (ref 149–390)
PMV BLD AUTO: 9 FL (ref 8.9–12.7)
POTASSIUM SERPL-SCNC: 4.1 MMOL/L (ref 3.5–5.3)
PROT SERPL-MCNC: 7.7 G/DL (ref 6.4–8.4)
RBC # BLD AUTO: 5.28 MILLION/UL (ref 3.81–5.12)
SODIUM SERPL-SCNC: 138 MMOL/L (ref 135–147)
TRIGL SERPL-MCNC: 93 MG/DL
TSH SERPL DL<=0.05 MIU/L-ACNC: 2.25 UIU/ML (ref 0.45–4.5)
WBC # BLD AUTO: 8.27 THOUSAND/UL (ref 4.31–10.16)

## 2023-04-02 LAB
EST. AVERAGE GLUCOSE BLD GHB EST-MCNC: 126 MG/DL
HBA1C MFR BLD: 6 %

## 2023-04-05 ENCOUNTER — OFFICE VISIT (OUTPATIENT)
Dept: INTERNAL MEDICINE CLINIC | Facility: CLINIC | Age: 43
End: 2023-04-05

## 2023-04-05 VITALS
HEIGHT: 63 IN | DIASTOLIC BLOOD PRESSURE: 82 MMHG | OXYGEN SATURATION: 98 % | HEART RATE: 76 BPM | SYSTOLIC BLOOD PRESSURE: 130 MMHG | BODY MASS INDEX: 51.91 KG/M2 | TEMPERATURE: 97 F | WEIGHT: 293 LBS

## 2023-04-05 DIAGNOSIS — M79.674 CHRONIC TOE PAIN, RIGHT FOOT: ICD-10-CM

## 2023-04-05 DIAGNOSIS — G89.29 CHRONIC TOE PAIN, RIGHT FOOT: ICD-10-CM

## 2023-04-05 DIAGNOSIS — I10 ESSENTIAL HYPERTENSION: ICD-10-CM

## 2023-04-05 DIAGNOSIS — Z00.00 ANNUAL PHYSICAL EXAM: Primary | ICD-10-CM

## 2023-04-05 RX ORDER — HYDROXYZINE HYDROCHLORIDE 25 MG/1
TABLET, FILM COATED ORAL
COMMUNITY
Start: 2023-04-02

## 2023-04-05 NOTE — PROGRESS NOTES
[facility]  INTERNAL MEDICINE [place]    NAME: Celina Rose  AGE: 39 y o  SEX: female  : 1980     DATE: 2023     Assessment and Plan:     Here today for annual physical  Medical history of hypertension, anxiety/depression/PTSD, insomnia, migraine, prediabetes    Prior mammogram in 2021  Discussed need for follow-up mammogram in 1 to 2 years  I think 2-year interval would be appropriate, we will readdress this at our next appointment      Problem List Items Addressed This Visit        Cardiovascular and Mediastinum    Essential hypertension     -Benicar 5 mg daily was started at our prior appointment  BP is acceptable today, she has not gotten any home readings  I suggested she obtain some home readings outside of the office to make sure BP is well controlled at home              Other    Chronic toe pain, right foot     Chronic pain in the right second and third toes  Patient states she fractured these toes previously  She does have slight lateral deviation of the third toe  Tenderness to palpation on the dorsal surface of the foot at the base of the second and third toes  She denies any recent trauma  She has tried orthotic inserts  -We will check x-rays to evaluate for underlying arthritis and new fracture         Relevant Orders    XR foot 3+ vw right   Other Visit Diagnoses     Annual physical exam    -  Primary          Immunizations and preventive care screenings were discussed with patient today  Appropriate education was printed on patient's after visit summary  Return in about 6 months (around 10/5/2023)       Chief Complaint:     Chief Complaint   Patient presents with   • Physical Exam     39year old female   • Results     Labs 3/31/23      History of Present Illness:     Adult Annual Physical       Depression Screening  PHQ-2/9 Depression Screening         General Health  Sleep:trouble falling asleep, "overall improved    Hearing: normal - bilateral   Vision: wears glasses  Dental: due for follow  \"Needs to be knocked out\"       /GYN Health  Denies any issues today  Does not follow with gynecology  Denies any family history of breast, ovarian, uterine, cervical cancer      Review of Systems:     Review of Systems   Past Medical History:     Past Medical History:   Diagnosis Date   • Allergic    • Asthma     seasonal asthma   • Essential hypertension 3/17/2023   • Insomnia    • Migraine    • Neuromuscular disorder (HCC)     RLS with PLMD   • Restless leg syndrome       Past Surgical History:     Past Surgical History:   Procedure Laterality Date   • ADENOIDECTOMY     •  SECTION     • WA EXCISION GANGLION WRIST DORSAL/VOLAR PRIMARY Right 2017    Procedure: WRIST EXCISION OF VOLAR GANGLION CYST ;  Surgeon: Krystyna Pascal MD;  Location: [facility];  Service: Orthopedics   • TONSILECTOMY AND ADNOIDECTOMY     • TONSILLECTOMY        Social History:     Social History     Socioeconomic History   • Marital status: /Civil Union     Spouse name: None   • Number of children: None   • Years of education: None   • Highest education level: None   Occupational History   • None   Tobacco Use   • Smoking status: Never   • Smokeless tobacco: Never   Substance and Sexual Activity   • Alcohol use: No   • Drug use: No   • Sexual activity: Yes     Partners: Male   Other Topics Concern   • None   Social History Narrative   • None     Social Determinants of Health     Financial Resource Strain: Not on file   Food Insecurity: Not on file   Transportation Needs: Not on file   Physical Activity: Not on file   Stress: Not on file   Social Connections: Not on file   Intimate Partner Violence: Not on file   Housing Stability: Not on file      Family History:     History reviewed  No pertinent family history     Current Medications:     Current Outpatient Medications   Medication Sig Dispense Refill   • albuterol " "(PROVENTIL HFA,VENTOLIN HFA) 90 mcg/act inhaler Inhale 2 puffs every 4 (four) hours as needed     • amitriptyline (ELAVIL) 75 mg tablet Take 1 tablet (75 mg total) by mouth daily at bedtime 90 tablet 1   • hydrOXYzine HCL (ATARAX) 25 mg tablet TAKE ONE TABLET BY MOUTH EVERY 6 HOURS AS NEEDED FOR ITCHING     • LORazepam (Ativan) 0 5 mg tablet Take 1 tablet (0 5 mg total) by mouth daily as needed for anxiety Take 1 tablet 30 to 60 minutes prior to lab draw 2 tablet 0   • Multiple Vitamin (MULTIVITAMIN) tablet Take 1 tablet by mouth daily     • olmesartan (BENICAR) 5 mg tablet Take 1 tablet (5 mg total) by mouth daily 90 tablet 0   • zolpidem (AMBIEN) 5 mg tablet Take 1 tablet (5 mg total) by mouth daily at bedtime as needed for sleep 30 tablet 0   • ciprofloxacin-dexamethasone (CIPRODEX) otic suspension Administer 4 drops into the left ear 2 (two) times a day for 7 days 7 5 mL 0   • rizatriptan (MAXALT) 5 MG tablet Take 5 mg by mouth daily as needed       No current facility-administered medications for this visit  Allergies: Allergies   Allergen Reactions   • Aspirin Hives     Other reaction(s): hives , shaking   • Ibuprofen Anaphylaxis   • Nsaids Anaphylaxis   • Penicillins Hives     Hives and high fever   • Reglan [Metoclopramide] Other (See Comments)     Patient reports she passes out and blood pressure raises severely within moments   • Robitussin (Alcohol Free) [Guaifenesin] GI Intolerance and Vomiting   • Other Rash     Strawberries cause rash  Aloe Vera - red, burning skin with blisters   • Doxycycline Diarrhea, Hives and Rash      Physical Exam:     /82 (BP Location: Left arm, Patient Position: Sitting, Cuff Size: Large)   Pulse 76   Temp (!) 97 °F (36 1 °C) (Tympanic)   Ht 5' 3\" (1 6 m)   Wt (!) 142 kg (312 lb)   LMP  (LMP Unknown) Comment: irregular periods  SpO2 98%   BMI 55 27 kg/m²     Physical Exam  Constitutional:       Appearance: Normal appearance  She is not ill-appearing   " HENT:      Head: Normocephalic and atraumatic  Eyes:      General: No scleral icterus  Right eye: No discharge  Left eye: No discharge  Cardiovascular:      Rate and Rhythm: Normal rate and regular rhythm  Heart sounds: No murmur heard  No friction rub  Pulmonary:      Effort: Pulmonary effort is normal       Breath sounds: Normal breath sounds  No wheezing or rales  Abdominal:      General: Abdomen is flat  There is no distension  Palpations: Abdomen is soft  Tenderness: There is no abdominal tenderness  Musculoskeletal:         General: No swelling, tenderness or deformity  Skin:     General: Skin is warm and dry  Findings: No erythema  Neurological:      Mental Status: She is alert and oriented to person, place, and time  Mental status is at baseline  Motor: No weakness     Psychiatric:         Mood and Affect: Mood normal          Behavior: Behavior normal           Max [place] Junes, DO  [facility]

## 2023-04-05 NOTE — ASSESSMENT & PLAN NOTE
-Benicar 5 mg daily was started at our prior appointment  BP is acceptable today, she has not gotten any home readings    I suggested she obtain some home readings outside of the office to make sure BP is well controlled at home

## 2023-04-05 NOTE — ASSESSMENT & PLAN NOTE
Chronic pain in the right second and third toes  Patient states she fractured these toes previously  She does have slight lateral deviation of the third toe  Tenderness to palpation on the dorsal surface of the foot at the base of the second and third toes  She denies any recent trauma    She has tried orthotic inserts  -We will check x-rays to evaluate for underlying arthritis and new fracture

## 2023-04-06 ENCOUNTER — TELEPHONE (OUTPATIENT)
Dept: ADMINISTRATIVE | Facility: OTHER | Age: 43
End: 2023-04-06

## 2023-04-06 NOTE — TELEPHONE ENCOUNTER
----- Message from Coby Webster MA sent at 4/5/2023  4:23 PM EDT -----  Regarding: care gap request  04/05/23 4:23 PM    Hello, our patient attached above has had Mammogram and Pap Smear (HPV) aka Cervical Cancer Screening completed/performed  Please assist in updating the patient chart by making an External outreach to [facility] facility located in [facility]   The date of service is 2021    Thank you,  Coby [facility]

## 2023-04-06 NOTE — TELEPHONE ENCOUNTER
Upon review of the In Basket request we were able to locate, review, and update the patient chart as requested for Mammogram    Unable to locate 2021 pap smear     Any additional questions or concerns should be emailed to the Practice Liaisons via the appropriate education email address, please do not reply via In Basket      Thank you  Catarino Duarte MA

## 2023-04-25 ENCOUNTER — OFFICE VISIT (OUTPATIENT)
Dept: INTERNAL MEDICINE CLINIC | Facility: CLINIC | Age: 43
End: 2023-04-25

## 2023-04-25 VITALS
DIASTOLIC BLOOD PRESSURE: 72 MMHG | HEART RATE: 77 BPM | BODY MASS INDEX: 51.91 KG/M2 | TEMPERATURE: 97.7 F | WEIGHT: 293 LBS | SYSTOLIC BLOOD PRESSURE: 130 MMHG | OXYGEN SATURATION: 94 % | HEIGHT: 63 IN | RESPIRATION RATE: 18 BRPM

## 2023-04-25 DIAGNOSIS — R10.12 LUQ PAIN: Primary | ICD-10-CM

## 2023-04-25 NOTE — PROGRESS NOTES
INTERNAL MEDICINE OFFICE VISIT  [facility] Internal Medicine- [place]    NAME: Trey Leyden  AGE: 39 y o  SEX: female    DATE OF ENCOUNTER: 4/25/2023    Assessment and Plan/History of Present Illness     Here today for same day appointment  Medical history of hypertension, anxiety/depression/PTSD, insomnia, migraine, prediabetes    Couple week history of intermittent left upper quadrant/anterior thoracic pain  No inciting injuries or trauma  Tender to palpation  She is not having any associated shortness of breath, nausea, vomiting, diarrhea, dysuria, increased urinary frequency  She does have some associated stomach upset when the pain is bad  Pain is improved with heating pad and hot showers  Has had some issues with flatulence lately  Not having significant reflux symptoms    She did have CT of the abdomen and pelvis completed back in February which did not show any significant pathology  There was evidence of 3 mm right renal nonobstructing calculus  Tiny fat-containing umbilical hernia    1  LUQ pain  -Cause unclear  It is possible pain is musculoskeletal or GI in origin  -I suggest she manage conservatively with oral NSAIDs, topical analgesics, heating pad, or topical lidocaine patches  Can also try Gas-X over-the-counter  May also try OTC antacid such as Pepcid  -Advised her to let me know if symptoms do not improve           No orders of the defined types were placed in this encounter        Chief Complaint     Chief Complaint   Patient presents with   • Follow-up     Sick and body ache on the left side tender to the touch time 2 to 3 days        Review of Systems     10 point ROS negative except per HPI    The following portions of the patient's history were reviewed and updated as appropriate: allergies, current medications, past family history, past medical history, past social history, past surgical history and problem list     Active Problem List     Patient Active Problem List   Diagnosis "  • Acute sinusitis   • Acute bacterial conjunctivitis of both eyes   • Anxiety and depression   • Primary insomnia   • Prediabetes   • Elevated alkaline phosphatase level   • Class 3 severe obesity due to excess calories without serious comorbidity with body mass index (BMI) of 50 0 to 59 9 in adult [facility])   • Migraine   • PTSD (post-traumatic stress disorder)   • Essential hypertension   • Acute anxiety   • Chronic toe pain, right foot       Objective     /72 (BP Location: Left arm, Patient Position: Sitting, Cuff Size: Standard)   Pulse 77   Temp 97 7 °F (36 5 °C)   Resp 18   Ht 5' 3\" (1 6 m)   Wt (!) 142 kg (314 lb)   LMP  (LMP Unknown) Comment: irregular periods  SpO2 94%   BMI 55 62 kg/m²     Physical Exam  Constitutional:       Appearance: Normal appearance  She is not ill-appearing  HENT:      Head: Normocephalic and atraumatic  Eyes:      General: No scleral icterus  Right eye: No discharge  Left eye: No discharge  Cardiovascular:      Rate and Rhythm: Normal rate and regular rhythm  Heart sounds: No murmur heard  No friction rub  Pulmonary:      Effort: Pulmonary effort is normal       Breath sounds: Normal breath sounds  No wheezing or rales  Abdominal:      General: There is no distension  Palpations: There is no mass  Tenderness: There is abdominal tenderness  There is no guarding or rebound  Musculoskeletal:         General: No swelling or tenderness  Skin:     Findings: No erythema or rash  Neurological:      Mental Status: She is alert  Mental status is at baseline  Gait: Gait normal    Psychiatric:         Mood and Affect: Mood normal          Behavior: Behavior normal          Pertinent Laboratory/Diagnostic Studies:  CT abdomen pelvis w contrast    Result Date: 2/28/2023  Impression: No evidence of acute abdominopelvic process  3 mm right renal nonobstructing calculus  Additional chronic findings and negatives as above   Workstation " performed: [id number]       Images and diagnostics reviewed     Current Medications     Current Outpatient Medications:   •  albuterol (PROVENTIL HFA,VENTOLIN HFA) 90 mcg/act inhaler, Inhale 2 puffs every 4 (four) hours as needed, Disp: , Rfl:   •  amitriptyline (ELAVIL) 75 mg tablet, Take 1 tablet (75 mg total) by mouth daily at bedtime, Disp: 90 tablet, Rfl: 1  •  ciprofloxacin-dexamethasone (CIPRODEX) otic suspension, Administer 4 drops into the left ear 2 (two) times a day for 7 days, Disp: 7 5 mL, Rfl: 0  •  furosemide (LASIX) 20 mg tablet, Take 1 tablet (20 mg total) by mouth daily as needed (For leg swelling), Disp: 30 tablet, Rfl: 0  •  hydrOXYzine HCL (ATARAX) 25 mg tablet, TAKE ONE TABLET BY MOUTH EVERY 6 HOURS AS NEEDED FOR ITCHING, Disp: , Rfl:   •  LORazepam (Ativan) 0 5 mg tablet, Take 1 tablet (0 5 mg total) by mouth daily as needed for anxiety Take 1 tablet 30 to 60 minutes prior to lab draw, Disp: 2 tablet, Rfl: 0  •  Multiple Vitamin (MULTIVITAMIN) tablet, Take 1 tablet by mouth daily, Disp: , Rfl:   •  olmesartan (BENICAR) 5 mg tablet, Take 1 tablet (5 mg total) by mouth daily, Disp: 90 tablet, Rfl: 0  •  rizatriptan (MAXALT) 5 MG tablet, Take 5 mg by mouth daily as needed, Disp: , Rfl:   •  zolpidem (AMBIEN) 5 mg tablet, Take 1 tablet (5 mg total) by mouth daily at bedtime as needed for sleep, Disp: 30 tablet, Rfl: 0    Health Maintenance     Health Maintenance   Topic Date Due   • Hepatitis C Screening  Never done   • COVID-19 Vaccine (1) Never done   • HIV Screening  Never done   • Cervical Cancer Screening  Never done   • Breast Cancer Screening: Mammogram  09/14/2022   • Influenza Vaccine (1) 06/30/2023 (Originally 9/1/2022)   • BMI: Followup Plan  03/03/2024   • Annual Physical  04/05/2024   • BMI: Adult  04/25/2024   • DTaP,Tdap,and Td Vaccines (2 - Td or Tdap) 12/25/2024   • Pneumococcal Vaccine: Pediatrics (0 to 5 Years) and At-Risk Patients (6 to 59 Years)  Aged Out   • HIB Vaccine  Aged Out   • IPV Vaccine  Aged Out   • Hepatitis A Vaccine  Aged Out   • Meningococcal ACWY Vaccine  Aged Out   • HPV Vaccine  Aged Out     Immunization History   Administered Date(s) Administered   • INFLUENZA 04/29/2020, 12/07/2021   • Tdap 12/25/2014       CHERELLE Parsons  Internal Medicine [place] [address] [address] , [address] [place] [address] [address]  [place], [address] [facility]  Office: [phone]  Fax: [phone]

## 2023-05-01 DIAGNOSIS — F51.01 PRIMARY INSOMNIA: ICD-10-CM

## 2023-05-02 RX ORDER — ZOLPIDEM TARTRATE 5 MG/1
5 TABLET ORAL
Qty: 30 TABLET | Refills: 0 | Status: SHIPPED | OUTPATIENT
Start: 2023-05-02

## 2023-05-19 ENCOUNTER — OFFICE VISIT (OUTPATIENT)
Dept: INTERNAL MEDICINE CLINIC | Facility: CLINIC | Age: 43
End: 2023-05-19

## 2023-05-19 VITALS
HEART RATE: 86 BPM | HEIGHT: 63 IN | TEMPERATURE: 97.3 F | RESPIRATION RATE: 18 BRPM | DIASTOLIC BLOOD PRESSURE: 86 MMHG | OXYGEN SATURATION: 98 % | WEIGHT: 293 LBS | SYSTOLIC BLOOD PRESSURE: 142 MMHG | BODY MASS INDEX: 51.91 KG/M2

## 2023-05-19 DIAGNOSIS — F51.01 PRIMARY INSOMNIA: ICD-10-CM

## 2023-05-19 DIAGNOSIS — L30.4 INTERTRIGO: ICD-10-CM

## 2023-05-19 DIAGNOSIS — I10 ESSENTIAL HYPERTENSION: ICD-10-CM

## 2023-05-19 DIAGNOSIS — F41.9 ANXIETY AND DEPRESSION: Primary | ICD-10-CM

## 2023-05-19 DIAGNOSIS — R60.9 PERIPHERAL EDEMA: ICD-10-CM

## 2023-05-19 DIAGNOSIS — F32.9 CURRENT EPISODE OF MAJOR DEPRESSIVE DISORDER WITHOUT PRIOR EPISODE, UNSPECIFIED DEPRESSION EPISODE SEVERITY: ICD-10-CM

## 2023-05-19 DIAGNOSIS — F32.A ANXIETY AND DEPRESSION: Primary | ICD-10-CM

## 2023-05-19 RX ORDER — FUROSEMIDE 20 MG/1
20 TABLET ORAL DAILY PRN
Qty: 30 TABLET | Refills: 0 | Status: SHIPPED | OUTPATIENT
Start: 2023-05-19

## 2023-05-19 RX ORDER — TRAZODONE HYDROCHLORIDE 50 MG/1
50 TABLET ORAL
Qty: 90 TABLET | Refills: 0 | Status: SHIPPED | OUTPATIENT
Start: 2023-05-19

## 2023-05-19 RX ORDER — NYSTATIN 100000 [USP'U]/G
POWDER TOPICAL 3 TIMES DAILY
Qty: 15 G | Refills: 0 | Status: SHIPPED | OUTPATIENT
Start: 2023-05-19

## 2023-05-19 NOTE — PROGRESS NOTES
INTERNAL MEDICINE OFFICE VISIT  [facility] Internal Medicine- [place]    NAME: Ralph Arreaga  AGE: 39 y o  SEX: female    DATE OF ENCOUNTER: 5/19/2023    Assessment and Plan/History of Present Illness     Here today for follow-up  Medical history of hypertension, anxiety/depression/PTSD, insomnia, migraine, prediabetes      1  Anxiety and depression  Assessment & Plan:  Amitriptyline dose was increased from 50 to 75 mg previously to help with insomnia and to reduce usage of Ambien but this has unfortunately resulted in weight gain -about 10 pounds over the last month  She apparently has had issues with weight gain on amitriptyline in the past   We reviewed alternative pharmacotherapy options, she is interested in trazodone  We will start trazodone and simultaneously wean amitriptyline as below    Weaning schedule will be as follows: At this time, take amitriptyline 50 mg daily at bedtime for 3 days, then  Take amitriptyline 25 mg daily at bedtime for 3 days, then  Take amitriptyline 25 mg daily at bedtime every other day for 3 days, then discontinue amitriptyline    Follow-up in 1 month to reassess      2  Current episode of major depressive disorder without prior episode, unspecified depression episode severity  -     traZODone (DESYREL) 50 mg tablet; Take 1 tablet (50 mg total) by mouth daily at bedtime    3  Primary insomnia  Assessment & Plan:  -We will wean off of amitriptyline and transition to trazodone as outlined elsewhere  -May continue with zolpidem as needed    Orders:  -     traZODone (DESYREL) 50 mg tablet; Take 1 tablet (50 mg total) by mouth daily at bedtime    4  Intertrigo  -     nystatin (MYCOSTATIN) powder; Apply topically 3 (three) times a day    5  Essential hypertension  Assessment & Plan:  BP slightly elevated today  She is tolerating Benicar without issue  She has not checked blood pressure regularly since her last appointment    I think she might benefit from dose increase but I do not "want to make too many medication changes today  -Can readdress at next visit                No orders of the defined types were placed in this encounter  Chief Complaint     Chief Complaint   Patient presents with   • Follow-up     High blood pressure check up / passing out 2 to 3 times a week she does not notice  possible (anxiety)       Review of Systems     10 point ROS negative except per HPI    The following portions of the patient's history were reviewed and updated as appropriate: allergies, current medications, past family history, past medical history, past social history, past surgical history and problem list     Active Problem List     Patient Active Problem List   Diagnosis   • Acute sinusitis   • Acute bacterial conjunctivitis of both eyes   • Anxiety and depression   • Primary insomnia   • Prediabetes   • Elevated alkaline phosphatase level   • Class 3 severe obesity due to excess calories without serious comorbidity with body mass index (BMI) of 50 0 to 59 9 in [facility])   • Migraine   • PTSD (post-traumatic stress disorder)   • Essential hypertension   • Acute anxiety   • Chronic toe pain, right foot       Objective     /86   Pulse 86   Temp (!) 97 3 °F (36 3 °C)   Resp 18   Ht 5' 3\" (1 6 m)   Wt (!) 147 kg (323 lb 12 8 oz)   LMP  (LMP Unknown) Comment: irregular periods  SpO2 98%   BMI 57 36 kg/m²     Physical Exam  Constitutional:       Appearance: Normal appearance  She is not ill-appearing  HENT:      Head: Normocephalic and atraumatic  Eyes:      General: No scleral icterus  Right eye: No discharge  Left eye: No discharge  Cardiovascular:      Rate and Rhythm: Normal rate and regular rhythm  Heart sounds: No murmur heard  No friction rub  Pulmonary:      Effort: Pulmonary effort is normal       Breath sounds: Normal breath sounds  No wheezing or rales  Musculoskeletal:         General: No swelling or tenderness     Skin:     Findings: No " erythema or rash  Neurological:      Mental Status: She is alert  Mental status is at baseline  Gait: Gait normal    Psychiatric:         Mood and Affect: Mood normal          Behavior: Behavior normal          Pertinent Laboratory/Diagnostic Studies:  CT abdomen pelvis w contrast    Result Date: 2/28/2023  Impression: No evidence of acute abdominopelvic process  3 mm right renal nonobstructing calculus  Additional chronic findings and negatives as above   Workstation performed: [id number]       Images and diagnostics reviewed     Current Medications     Current Outpatient Medications:   •  albuterol (PROVENTIL HFA,VENTOLIN HFA) 90 mcg/act inhaler, Inhale 2 puffs every 4 (four) hours as needed, Disp: , Rfl:   •  amitriptyline (ELAVIL) 75 mg tablet, Take 1 tablet (75 mg total) by mouth daily at bedtime, Disp: 90 tablet, Rfl: 1  •  hydrOXYzine HCL (ATARAX) 25 mg tablet, TAKE ONE TABLET BY MOUTH EVERY 6 HOURS AS NEEDED FOR ITCHING, Disp: , Rfl:   •  LORazepam (Ativan) 0 5 mg tablet, Take 1 tablet (0 5 mg total) by mouth daily as needed for anxiety Take 1 tablet 30 to 60 minutes prior to lab draw, Disp: 2 tablet, Rfl: 0  •  Multiple Vitamin (MULTIVITAMIN) tablet, Take 1 tablet by mouth daily, Disp: , Rfl:   •  nystatin (MYCOSTATIN) powder, Apply topically 3 (three) times a day, Disp: 15 g, Rfl: 0  •  olmesartan (BENICAR) 5 mg tablet, Take 1 tablet (5 mg total) by mouth daily, Disp: 90 tablet, Rfl: 0  •  traZODone (DESYREL) 50 mg tablet, Take 1 tablet (50 mg total) by mouth daily at bedtime, Disp: 90 tablet, Rfl: 0  •  zolpidem (AMBIEN) 5 mg tablet, Take 1 tablet (5 mg total) by mouth daily at bedtime as needed for sleep, Disp: 30 tablet, Rfl: 0  •  furosemide (LASIX) 20 mg tablet, Take 1 tablet (20 mg total) by mouth daily as needed (For leg swelling), Disp: 30 tablet, Rfl: 0  •  rizatriptan (MAXALT) 5 MG tablet, Take 5 mg by mouth daily as needed, Disp: , Rfl:     Health Maintenance     Health Maintenance   Topic Date Due   • Hepatitis C Screening  Never done   • COVID-19 Vaccine (1) Never done   • HIV Screening  Never done   • Cervical Cancer Screening  Never done   • Breast Cancer Screening: Mammogram  09/14/2022   • Influenza Vaccine (Season Ended) 09/01/2023   • BMI: Followup Plan  03/03/2024   • Annual Physical  04/05/2024   • BMI: Adult  05/19/2024   • DTaP,Tdap,and Td Vaccines (2 - Td or Tdap) 12/25/2024   • Pneumococcal Vaccine: Pediatrics (0 to 5 Years) and At-Risk Patients (6 to 59 Years)  Aged Out   • HIB Vaccine  Aged Out   • IPV Vaccine  Aged Out   • Hepatitis A Vaccine  Aged Out   • Meningococcal ACWY Vaccine  Aged Out   • HPV Vaccine  Aged [employer] History   Administered Date(s) Administered   • INFLUENZA 04/29/2020, 12/07/2021, 02/06/2022   • Tdap 12/25/2014       [facility] [address] , [facility] [address]  [place], [address] [facility]  Office: [phone]  Fax: [phone]      Answers for HPI/ROS submitted by the patient on 5/12/2023  Chronicity: recurrent  Onset: more than 1 month ago  Progression since onset: waxing and waning  Condition status: controlled  anxiety: Yes  blurred vision: No  chest pain: No  headaches: Yes  malaise/fatigue: No  neck pain: No  orthopnea: No  palpitations: No  peripheral edema: Yes  PND: No  shortness of breath: No  sweats: No  Agents associated with hypertension: amphetamines, NSAIDs  CAD risks: no known risk factors

## 2023-05-19 NOTE — PATIENT INSTRUCTIONS
We are going to start low-dose of trazodone and attempt to wean you off of the amitriptyline  I am going to send for 25 mg tablets of amitriptyline to your pharmacy  Weaning schedule will be as follows:   At this time, take amitriptyline 50 mg daily at bedtime for 3 days, then  Take amitriptyline 25 mg daily at bedtime for 3 days, then  Take amitriptyline 25 mg daily at bedtime every other day for 3 days, then discontinue amitriptyline

## 2023-05-19 NOTE — ASSESSMENT & PLAN NOTE
Amitriptyline dose was increased from 50 to 75 mg previously to help with insomnia and to reduce usage of Ambien but this has unfortunately resulted in weight gain -about 10 pounds over the last month  She apparently has had issues with weight gain on amitriptyline in the past   We reviewed alternative pharmacotherapy options, she is interested in trazodone  We will start trazodone and simultaneously wean amitriptyline as below    Weaning schedule will be as follows:   At this time, take amitriptyline 50 mg daily at bedtime for 3 days, then  Take amitriptyline 25 mg daily at bedtime for 3 days, then  Take amitriptyline 25 mg daily at bedtime every other day for 3 days, then discontinue amitriptyline    Follow-up in 1 month to reassess

## 2023-05-19 NOTE — ASSESSMENT & PLAN NOTE
BP slightly elevated today  She is tolerating Benicar without issue  She has not checked blood pressure regularly since her last appointment    I think she might benefit from dose increase but I do not want to make too many medication changes today  -Can readdress at next visit

## 2023-05-19 NOTE — ASSESSMENT & PLAN NOTE
-We will wean off of amitriptyline and transition to trazodone as outlined elsewhere  -May continue with zolpidem as needed

## 2023-05-22 ENCOUNTER — HOSPITAL ENCOUNTER (OUTPATIENT)
Dept: RADIOLOGY | Facility: HOSPITAL | Age: 43
Discharge: HOME | End: 2023-05-22

## 2023-05-22 DIAGNOSIS — G89.29 CHRONIC TOE PAIN, RIGHT FOOT: ICD-10-CM

## 2023-05-22 DIAGNOSIS — M79.674 CHRONIC TOE PAIN, RIGHT FOOT: ICD-10-CM

## 2023-05-24 ENCOUNTER — PATIENT MESSAGE (OUTPATIENT)
Dept: INTERNAL MEDICINE CLINIC | Facility: CLINIC | Age: 43
End: 2023-05-24

## 2023-05-24 DIAGNOSIS — M79.671 RIGHT FOOT PAIN: Primary | ICD-10-CM

## 2023-05-24 DIAGNOSIS — L03.031 CELLULITIS OF RIGHT TOE: ICD-10-CM

## 2023-05-25 RX ORDER — METHYLPREDNISOLONE 4 MG/1
TABLET ORAL
Qty: 21 EACH | Refills: 0 | Status: SHIPPED | OUTPATIENT
Start: 2023-05-25

## 2023-05-25 RX ORDER — SULFAMETHOXAZOLE AND TRIMETHOPRIM 800; 160 MG/1; MG/1
1 TABLET ORAL EVERY 12 HOURS SCHEDULED
Qty: 14 TABLET | Refills: 0 | Status: SHIPPED | OUTPATIENT
Start: 2023-05-25 | End: 2023-06-01

## 2023-05-25 NOTE — TELEPHONE ENCOUNTER
From: Leana Guadalupe  To: Belkis Ariza [id number]  Sent: 5/24/2023 10:31 PM EDT  Subject: Tej Wilks    I went for my foot xray today and inwas wondering do you know when i will get result? I was just wondering because I'm in so much pain with my foot   Also its get worse to point i kena barely walk   Thank you for any info

## 2023-05-26 NOTE — PROGRESS NOTES
Patient accompanied her son to his appointment today  I have been evaluating Poppy Denise for significant pain and swelling in her right fourth toe  She was sent for x-rays which were unrevealing  She has significant tenderness to palpation on exam   Minimal erythema and warmth  I had previously sent for Bactrim and Medrol Dosepak to her pharmacy  She does report a prior history of gout  Clinically, I think it is more likely she is having some sort of inflammatory process and likely gout of the toe  Recommend she continue with the steroid and hold off on the antibiotic for now    Patient agreeable to plan

## 2023-06-07 ENCOUNTER — OFFICE VISIT (OUTPATIENT)
Dept: INTERNAL MEDICINE CLINIC | Facility: CLINIC | Age: 43
End: 2023-06-07
Payer: MEDICARE

## 2023-06-07 VITALS
BODY MASS INDEX: 51.91 KG/M2 | HEART RATE: 84 BPM | HEIGHT: 63 IN | SYSTOLIC BLOOD PRESSURE: 148 MMHG | RESPIRATION RATE: 13 BRPM | DIASTOLIC BLOOD PRESSURE: 88 MMHG | TEMPERATURE: 97.8 F | WEIGHT: 293 LBS

## 2023-06-07 DIAGNOSIS — I10 ESSENTIAL HYPERTENSION: Primary | ICD-10-CM

## 2023-06-07 DIAGNOSIS — M25.431 SWELLING OF RIGHT WRIST: ICD-10-CM

## 2023-06-07 DIAGNOSIS — F41.9 ANXIETY AND DEPRESSION: ICD-10-CM

## 2023-06-07 DIAGNOSIS — F32.A ANXIETY AND DEPRESSION: ICD-10-CM

## 2023-06-07 PROCEDURE — 99214 OFFICE O/P EST MOD 30 MIN: CPT | Performed by: INTERNAL MEDICINE

## 2023-06-07 RX ORDER — OLMESARTAN MEDOXOMIL 5 MG/1
10 TABLET ORAL DAILY
Qty: 180 TABLET | Refills: 0
Start: 2023-06-07 | End: 2023-06-07

## 2023-06-07 RX ORDER — OLMESARTAN MEDOXOMIL 5 MG/1
10 TABLET ORAL DAILY
Qty: 180 TABLET | Refills: 0 | Status: SHIPPED | OUTPATIENT
Start: 2023-06-07

## 2023-06-07 NOTE — PROGRESS NOTES
INTERNAL MEDICINE OFFICE VISIT  [facility]- [place]    NAME: Fransico Guadalupe  AGE: 39 y o  SEX: female    DATE OF ENCOUNTER: 6/8/2023    Assessment and Plan/History of Present Illness     Here today for follow-up  1  Essential hypertension  Assessment & Plan:  /88 today  Her home readings have reportedly been in the similar range  We will increase Benicar from 5 mg daily to 10 mg daily    Orders:  -     olmesartan (BENICAR) 5 mg tablet; Take 2 tablets (10 mg total) by mouth daily    2  Swelling of right wrist  Assessment & Plan:  Swelling of the right thenar eminence apparent on exam   Minimally tender to palpation  No erythema or rash  She denies any inciting trauma or change in activity level  She does have some discomfort that radiates into the palm with associated tingling  The cause of this is unclear - possible component of CTS? Recommend she manage with oral NSAIDs, topical analgesics as needed, compression  If this does not improve, we will obtain radiographs to rule out underlying bony pathology      3  Anxiety and depression  Assessment & Plan:  She has weaned off of amitriptyline since our last appointment (done in the setting of weight gain)  She has transitioned to trazodone with good effect  Continue with trazodone                    No orders of the defined types were placed in this encounter  Chief Complaint     Chief Complaint   Patient presents with   • Follow-up     Needs pap (ordered)  Meds not verified           Review of Systems     10 point ROS negative except per HPI    The following portions of the patient's history were reviewed and updated as appropriate: allergies, current medications, past family history, past medical history, past social history, past surgical history and problem list     Active Problem List     Patient Active Problem List   Diagnosis   • Acute sinusitis   • Acute bacterial conjunctivitis of both eyes   • Anxiety and depression "  • Primary insomnia   • Prediabetes   • Elevated alkaline phosphatase level   • Class 3 severe obesity due to excess calories without serious comorbidity with body mass index (BMI) of 50 0 to 59 9 in adult [facility])   • Migraine   • PTSD (post-traumatic stress disorder)   • Essential hypertension   • Acute anxiety   • Chronic toe pain, right foot   • Swelling of right wrist   • Current episode of major depressive disorder without prior episode       Objective     /88 (BP Location: Left arm, Patient Position: Sitting, Cuff Size: Standard)   Pulse 84   Temp 97 8 °F (36 6 °C)   Resp 13   Ht 5' 3\" (1 6 m)   Wt (!) 149 kg (328 lb)   LMP  (LMP Unknown) Comment: irregular periods  BMI 58 10 kg/m²     Physical Exam    Pertinent Laboratory/Diagnostic Studies:  XR foot 3+ vw right    Result Date: 5/25/2023  Impression: No acute osseous abnormality   Workstation performed: [id number]       Images and diagnostics reviewed     Current Medications     Current Outpatient Medications:   •  olmesartan (BENICAR) 5 mg tablet, Take 2 tablets (10 mg total) by mouth daily, Disp: 180 tablet, Rfl: 0  •  albuterol (PROVENTIL HFA,VENTOLIN HFA) 90 mcg/act inhaler, Inhale 2 puffs every 4 (four) hours as needed, Disp: , Rfl:   •  furosemide (LASIX) 20 mg tablet, Take 1 tablet (20 mg total) by mouth daily as needed (For leg swelling), Disp: 30 tablet, Rfl: 0  •  hydrOXYzine HCL (ATARAX) 25 mg tablet, TAKE ONE TABLET BY MOUTH EVERY 6 HOURS AS NEEDED FOR ITCHING, Disp: , Rfl:   •  LORazepam (Ativan) 0 5 mg tablet, Take 1 tablet (0 5 mg total) by mouth daily as needed for anxiety Take 1 tablet 30 to 60 minutes prior to lab draw, Disp: 2 tablet, Rfl: 0  •  methylPREDNISolone 4 MG tablet therapy pack, Use as directed on package (Patient not taking: Reported on 6/7/2023), Disp: 21 each, Rfl: 0  •  Multiple Vitamin (MULTIVITAMIN) tablet, Take 1 tablet by mouth daily, Disp: , Rfl:   •  nystatin (MYCOSTATIN) powder, Apply topically 3 (three) " times a day, Disp: 15 g, Rfl: 0  •  rizatriptan (MAXALT) 5 MG tablet, Take 5 mg by mouth daily as needed, Disp: , Rfl:   •  traZODone (DESYREL) 50 mg tablet, Take 1 tablet (50 mg total) by mouth daily at bedtime, Disp: 90 tablet, Rfl: 0  •  zolpidem (AMBIEN) 5 mg tablet, Take 1 tablet (5 mg total) by mouth daily at bedtime as needed for sleep, Disp: 30 tablet, Rfl: 0    Health Maintenance     Health Maintenance   Topic Date Due   • Hepatitis C Screening  Never done   • COVID-19 Vaccine (1) Never done   • HIV Screening  Never done   • Cervical Cancer Screening  Never done   • Breast Cancer Screening: Mammogram  09/14/2022   • Influenza Vaccine (Season Ended) 09/01/2023   • BMI: Followup Plan  03/03/2024   • Annual Physical  04/05/2024   • BMI: Adult  06/07/2024   • DTaP,Tdap,and Td Vaccines (2 - Td or Tdap) 12/25/2024   • Pneumococcal Vaccine: Pediatrics (0 to 5 Years) and At-Risk Patients (6 to 59 Years)  Aged Out   • HIB Vaccine  Aged Out   • IPV Vaccine  Aged Out   • Hepatitis A Vaccine  Aged Out   • Meningococcal ACWY Vaccine  Aged Out   • HPV Vaccine  Aged [employer] History   Administered Date(s) Administered   • INFLUENZA 04/29/2020, 12/07/2021, 02/06/2022   • Tdap 12/25/2014       CHERELLE Toribio  Internal Medicine [place] [address] [address], [facility] [address]  [place], [address] [facility]  Office: [phone]  Fax: [phone]

## 2023-06-08 ENCOUNTER — TELEPHONE (OUTPATIENT)
Dept: ADMINISTRATIVE | Facility: OTHER | Age: 43
End: 2023-06-08

## 2023-06-08 PROBLEM — M25.431 SWELLING OF RIGHT WRIST: Status: ACTIVE | Noted: 2023-06-08

## 2023-06-08 PROBLEM — F32.9 CURRENT EPISODE OF MAJOR DEPRESSIVE DISORDER WITHOUT PRIOR EPISODE: Status: ACTIVE | Noted: 2023-06-08

## 2023-06-08 NOTE — TELEPHONE ENCOUNTER
Upon review of the In Basket request we were able to locate, review, and update the patient chart as requested for Mammogram     Any additional questions or concerns should be emailed to the Practice Liaisons via the appropriate education email address, please do not reply via In Basket      Thank you  Ana Simons MA

## 2023-06-08 NOTE — ASSESSMENT & PLAN NOTE
Swelling of the right thenar eminence apparent on exam   Minimally tender to palpation  No erythema or rash  She denies any inciting trauma or change in activity level  She does have some discomfort that radiates into the palm with associated tingling  The cause of this is unclear - possible component of CTS? Recommend she manage with oral NSAIDs, topical analgesics as needed, compression    If this does not improve, we will obtain radiographs to rule out underlying bony pathology

## 2023-06-08 NOTE — ASSESSMENT & PLAN NOTE
/88 today  Her home readings have reportedly been in the similar range    We will increase Benicar from 5 mg daily to 10 mg daily

## 2023-06-08 NOTE — ASSESSMENT & PLAN NOTE
She has weaned off of amitriptyline since our last appointment (done in the setting of weight gain)  She has transitioned to trazodone with good effect    Continue with trazodone

## 2023-06-08 NOTE — TELEPHONE ENCOUNTER
----- Message from Roberto Reyes MA sent at 6/7/2023  5:53 PM EDT -----  Regarding: mammo  06/07/23 5:54 PM    Hello, our patient Jigna Uribe has had Mammogram completed/performed  Please assist in updating the patient chart by pulling the Care Everywhere (CE) document  The date of service is 4 12 2023       Thank you,  Roberto Reyes MA  [facility] [place]

## 2023-07-13 ENCOUNTER — OFFICE VISIT (OUTPATIENT)
Dept: INTERNAL MEDICINE CLINIC | Facility: CLINIC | Age: 43
End: 2023-07-13
Payer: MEDICARE

## 2023-07-13 VITALS
OXYGEN SATURATION: 96 % | TEMPERATURE: 97.3 F | DIASTOLIC BLOOD PRESSURE: 80 MMHG | HEART RATE: 67 BPM | SYSTOLIC BLOOD PRESSURE: 142 MMHG | WEIGHT: 293 LBS | HEIGHT: 63 IN | BODY MASS INDEX: 51.91 KG/M2

## 2023-07-13 DIAGNOSIS — G43.709 CHRONIC MIGRAINE WITHOUT AURA WITHOUT STATUS MIGRAINOSUS, NOT INTRACTABLE: ICD-10-CM

## 2023-07-13 DIAGNOSIS — G43.711 INTRACTABLE CHRONIC MIGRAINE WITHOUT AURA AND WITH STATUS MIGRAINOSUS: Primary | ICD-10-CM

## 2023-07-13 PROCEDURE — 99214 OFFICE O/P EST MOD 30 MIN: CPT | Performed by: INTERNAL MEDICINE

## 2023-07-13 RX ORDER — PREDNISONE 20 MG/1
40 TABLET ORAL DAILY
Qty: 10 TABLET | Refills: 0 | Status: SHIPPED | OUTPATIENT
Start: 2023-07-13 | End: 2023-07-18

## 2023-07-13 NOTE — PROGRESS NOTES
INTERNAL MEDICINE OFFICE VISIT  [facility] Internal Medicine- [place]    NAME: Rohini Soliz  AGE: 39 y.o. SEX: female    DATE OF ENCOUNTER: 7/13/2023    Assessment and Plan/History of Present Illness     Here today for evaluation of headaches. Medical history of hypertension, anxiety/depression/PTSD, insomnia, migraine, prediabetes      Onset of headache this prior Sunday. Started as a "regular" headache and then progressed to migraine headaches. Symptoms worsened Monday. Resolved Wednesday but then recurred Wednesday night with very severe symptoms. Current discomfort is bifrontal extending over the top of the head. Lack of response to Maxalt which is usually helpful for her migraines. She has photosensitivity. Some associated nausea. She has taken Zofran which has helped a bit      1. Intractable chronic migraine without aura and with status migrainosus  -Recommend course of prednisone for 5 days. Advised her to take Zofran scheduled every 8 hours for the first 2 or 3 days in addition to the prednisone  -Advised her to let me know if no improvement in her symptoms      2. Chronic migraine without aura without status migrainosus, not intractable  - predniSONE 20 mg tablet; Take 2 tablets (40 mg total) by mouth daily for 5 days  Dispense: 10 tablet; Refill: 0          No orders of the defined types were placed in this encounter.       Chief Complaint     Chief Complaint   Patient presents with   • Headache     Headaches for 3 days        Review of Systems     10 point ROS negative except per HPI    The following portions of the patient's history were reviewed and updated as appropriate: allergies, current medications, past family history, past medical history, past social history, past surgical history and problem list.    Active Problem List     Patient Active Problem List   Diagnosis   • Acute sinusitis   • Anxiety and depression   • Primary insomnia   • Prediabetes   • Elevated alkaline phosphatase level   • Class 3 severe obesity due to excess calories without serious comorbidity with body mass index (BMI) of 50.0 to 59.9 in adult [facility])   • Migraine   • PTSD (post-traumatic stress disorder)   • Essential hypertension   • Acute anxiety   • Chronic toe pain, right foot   • Swelling of right wrist   • Current episode of major depressive disorder without prior episode       Objective     /80 (BP Location: Left arm, Patient Position: Sitting, Cuff Size: Standard)   Pulse 67   Temp (!) 97.3 °F (36.3 °C)   Ht 5' 3" (1.6 m)   Wt (!) 146 kg (322 lb)   LMP  (LMP Unknown) Comment: irregular periods  SpO2 96%   BMI 57.04 kg/m²     Physical Exam    Pertinent Laboratory/Diagnostic Studies:  XR foot 3+ vw right    Result Date: 5/25/2023  Impression: No acute osseous abnormality.  Workstation performed: [id number]       Images and diagnostics reviewed     Current Medications     Current Outpatient Medications:   •  albuterol (PROVENTIL HFA,VENTOLIN HFA) 90 mcg/act inhaler, Inhale 2 puffs every 4 (four) hours as needed, Disp: , Rfl:   •  furosemide (LASIX) 20 mg tablet, Take 1 tablet (20 mg total) by mouth daily as needed (For leg swelling), Disp: 30 tablet, Rfl: 0  •  hydrOXYzine HCL (ATARAX) 25 mg tablet, TAKE ONE TABLET BY MOUTH EVERY 6 HOURS AS NEEDED FOR ITCHING, Disp: , Rfl:   •  LORazepam (Ativan) 0.5 mg tablet, Take 1 tablet (0.5 mg total) by mouth daily as needed for anxiety Take 1 tablet 30 to 60 minutes prior to lab draw, Disp: 2 tablet, Rfl: 0  •  Multiple Vitamin (MULTIVITAMIN) tablet, Take 1 tablet by mouth daily, Disp: , Rfl:   •  nystatin (MYCOSTATIN) powder, Apply topically 3 (three) times a day, Disp: 15 g, Rfl: 0  •  olmesartan (BENICAR) 5 mg tablet, Take 2 tablets (10 mg total) by mouth daily, Disp: 180 tablet, Rfl: 0  •  predniSONE 20 mg tablet, Take 2 tablets (40 mg total) by mouth daily for 5 days, Disp: 10 tablet, Rfl: 0  •  rizatriptan (MAXALT) 5 MG tablet, Take 5 mg by mouth daily as needed, Disp: , Rfl:   •  traZODone (DESYREL) 50 mg tablet, Take 1 tablet (50 mg total) by mouth daily at bedtime, Disp: 90 tablet, Rfl: 0  •  zolpidem (AMBIEN) 5 mg tablet, Take 1 tablet (5 mg total) by mouth daily at bedtime as needed for sleep, Disp: 30 tablet, Rfl: 0    Health Maintenance     Health Maintenance   Topic Date Due   • Hepatitis C Screening  Never done   • COVID-19 Vaccine (1) Never done   • HIV Screening  Never done   • Cervical Cancer Screening  Never done   • Influenza Vaccine (1) 09/01/2023   • Depression Remission PHQ  01/13/2024   • BMI: Followup Plan  03/03/2024   • Annual Physical  04/05/2024   • Breast Cancer Screening: Mammogram  04/12/2024   • BMI: Adult  07/13/2024   • DTaP,Tdap,and Td Vaccines (2 - Td or Tdap) 12/25/2024   • Pneumococcal Vaccine: Pediatrics (0 to 5 Years) and At-Risk Patients (6 to 59 Years)  Aged Out   • HIB Vaccine  Aged Out   • IPV Vaccine  Aged Out   • Hepatitis A Vaccine  Aged Out   • Meningococcal ACWY Vaccine  Aged Out   • HPV Vaccine  Aged [employer] History   Administered Date(s) Administered   • INFLUENZA 04/29/2020, 12/07/2021, 02/06/2022   • Tdap 12/25/2014       Garrick Sullivan D.O.   [facility] Internal Medicine [place] [address], [facility] [address]  [address]  Office: [phone]  Fax: [phone]

## 2023-07-24 DIAGNOSIS — L30.4 INTERTRIGO: ICD-10-CM

## 2023-07-24 DIAGNOSIS — R60.9 PERIPHERAL EDEMA: ICD-10-CM

## 2023-07-25 RX ORDER — FUROSEMIDE 20 MG/1
20 TABLET ORAL DAILY PRN
Qty: 30 TABLET | Refills: 0 | Status: SHIPPED | OUTPATIENT
Start: 2023-07-25

## 2023-07-25 RX ORDER — HYDROXYZINE HYDROCHLORIDE 25 MG/1
25 TABLET, FILM COATED ORAL EVERY 6 HOURS PRN
Qty: 60 TABLET | Refills: 0 | Status: SHIPPED | OUTPATIENT
Start: 2023-07-25

## 2023-07-25 RX ORDER — NYSTATIN 100000 [USP'U]/G
POWDER TOPICAL 3 TIMES DAILY
Qty: 15 G | Refills: 0 | Status: SHIPPED | OUTPATIENT
Start: 2023-07-25

## 2023-08-21 ENCOUNTER — OFFICE VISIT (OUTPATIENT)
Dept: INTERNAL MEDICINE CLINIC | Facility: CLINIC | Age: 43
End: 2023-08-21
Payer: MEDICARE

## 2023-08-21 VITALS
DIASTOLIC BLOOD PRESSURE: 80 MMHG | HEART RATE: 80 BPM | OXYGEN SATURATION: 97 % | SYSTOLIC BLOOD PRESSURE: 130 MMHG | HEIGHT: 63 IN | BODY MASS INDEX: 51.91 KG/M2 | TEMPERATURE: 97.1 F | RESPIRATION RATE: 16 BRPM | WEIGHT: 293 LBS

## 2023-08-21 DIAGNOSIS — M54.50 ACUTE MIDLINE LOW BACK PAIN WITHOUT SCIATICA: Primary | ICD-10-CM

## 2023-08-21 PROCEDURE — 99214 OFFICE O/P EST MOD 30 MIN: CPT | Performed by: INTERNAL MEDICINE

## 2023-08-21 RX ORDER — METHYLPREDNISOLONE 4 MG/1
TABLET ORAL
Qty: 21 EACH | Refills: 0 | Status: SHIPPED | OUTPATIENT
Start: 2023-08-21

## 2023-08-21 NOTE — PROGRESS NOTES
INTERNAL MEDICINE OFFICE VISIT  [facility] Internal Medicine- [place]    NAME: Christopher Uribe  AGE: 39 y.o. SEX: female    DATE OF ENCOUNTER: 8/22/2023    Assessment and Plan/History of Present Illness     Here today for same-day appointment for evaluation of low back pain    Low back pain present for 1 month. Worsening. No inciting falls, trauma, change in activity level. No associated numbness, paresthesias, bowel or bladder incontinence. Worse with bending. Located in the lumbosacral and paraspinal area    Reports issues with back pain in the past.  Has done physical therapy previously which did not help much. Injections in the past made her pain worse    Current pain improved with heat, topical lidocaine    1. Acute midline low back pain without sciatica  -     methylPREDNISolone 4 MG tablet therapy pack; Use as directed on package    Acute low back pain. No red flags on exam  Recommend ongoing conservative care, topical analgesics, heat, low back exercises. We will send for course of Medrol Dosepak to pharmacy, she has NSAID allergy          No orders of the defined types were placed in this encounter.       Chief Complaint     Chief Complaint   Patient presents with   • Back Pain       Review of Systems     10 point ROS negative except per HPI    The following portions of the patient's history were reviewed and updated as appropriate: allergies, current medications, past family history, past medical history, past social history, past surgical history and problem list.    Active Problem List     Patient Active Problem List   Diagnosis   • Acute sinusitis   • Anxiety and depression   • Primary insomnia   • Prediabetes   • Elevated alkaline phosphatase level   • Class 3 severe obesity due to excess calories without serious comorbidity with body mass index (BMI) of 50.0 to 59.9 in [facility])   • Migraine   • PTSD (post-traumatic stress disorder)   • Essential hypertension   • Acute anxiety   • Chronic toe pain, right foot   • Swelling of right wrist   • Current episode of major depressive disorder without prior episode       Objective     /80 (BP Location: Left arm, Patient Position: Sitting, Cuff Size: Standard)   Pulse 80   Temp (!) 97.1 °F (36.2 °C)   Resp 16   Ht 5' 3" (1.6 m)   Wt (!) 146 kg (321 lb)   LMP  (LMP Unknown) Comment: irregular periods  SpO2 97%   BMI 56.86 kg/m²     Physical Exam  Musculoskeletal:         General: No swelling, deformity or signs of injury. Neurological:      General: No focal deficit present. Mental Status: Mental status is at baseline. Sensory: No sensory deficit. Motor: No weakness. Coordination: Coordination normal.      Gait: Gait normal.         Pertinent Laboratory/Diagnostic Studies:  XR foot 3+ vw right    Result Date: 5/25/2023  Impression: No acute osseous abnormality.  Workstation performed: [id number]       Images and diagnostics reviewed     Current Medications     Current Outpatient Medications:   •  albuterol (PROVENTIL HFA,VENTOLIN HFA) 90 mcg/act inhaler, Inhale 2 puffs every 4 (four) hours as needed, Disp: , Rfl:   •  furosemide (LASIX) 20 mg tablet, Take 1 tablet (20 mg total) by mouth daily as needed (For leg swelling), Disp: 30 tablet, Rfl: 0  •  hydrOXYzine HCL (ATARAX) 25 mg tablet, Take 1 tablet (25 mg total) by mouth every 6 (six) hours as needed for itching, Disp: 60 tablet, Rfl: 0  •  LORazepam (Ativan) 0.5 mg tablet, Take 1 tablet (0.5 mg total) by mouth daily as needed for anxiety Take 1 tablet 30 to 60 minutes prior to lab draw, Disp: 2 tablet, Rfl: 0  •  methylPREDNISolone 4 MG tablet therapy pack, Use as directed on package, Disp: 21 each, Rfl: 0  •  Multiple Vitamin (MULTIVITAMIN) tablet, Take 1 tablet by mouth daily, Disp: , Rfl:   •  nystatin (MYCOSTATIN) powder, Apply topically 3 (three) times a day, Disp: 15 g, Rfl: 0  •  olmesartan (BENICAR) 5 mg tablet, Take 2 tablets (10 mg total) by mouth daily, Disp: 180 tablet, Rfl: 0  •  traZODone (DESYREL) 50 mg tablet, Take 1 tablet (50 mg total) by mouth daily at bedtime, Disp: 90 tablet, Rfl: 0  •  zolpidem (AMBIEN) 5 mg tablet, Take 1 tablet (5 mg total) by mouth daily at bedtime as needed for sleep, Disp: 30 tablet, Rfl: 0  •  rizatriptan (MAXALT) 5 MG tablet, Take 5 mg by mouth daily as needed, Disp: , Rfl:     Health Maintenance     Health Maintenance   Topic Date Due   • Hepatitis C Screening  Never done   • COVID-19 Vaccine (1) Never done   • HIV Screening  Never done   • Cervical Cancer Screening  Never done   • Influenza Vaccine (1) 09/01/2023   • Depression Remission PHQ  01/13/2024   • BMI: Followup Plan  03/03/2024   • Annual Physical  04/05/2024   • Breast Cancer Screening: Mammogram  04/12/2024   • BMI: Adult  08/21/2024   • DTaP,Tdap,and Td Vaccines (2 - Td or Tdap) 12/25/2024   • Pneumococcal Vaccine: Pediatrics (0 to 5 Years) and At-Risk Patients (6 to 59 Years)  Aged Out   • HIB Vaccine  Aged Out   • IPV Vaccine  Aged Out   • Hepatitis A Vaccine  Aged Out   • Meningococcal ACWY Vaccine  Aged Out   • HPV Vaccine  Aged [employer] History   Administered Date(s) Administered   • INFLUENZA 04/29/2020, 12/07/2021, 02/06/2022   • Tdap 12/25/2014       Garrick Thapa D.O.   [facility] Internal Medicine [place] [address], [facility] [address]  [facility], [address] [place] [address]  Office: [phone]  Fax: [phone]

## 2023-09-25 ENCOUNTER — OFFICE VISIT (OUTPATIENT)
Dept: INTERNAL MEDICINE CLINIC | Facility: CLINIC | Age: 43
End: 2023-09-25
Payer: MEDICARE

## 2023-09-25 VITALS
OXYGEN SATURATION: 95 % | TEMPERATURE: 98 F | RESPIRATION RATE: 18 BRPM | WEIGHT: 293 LBS | DIASTOLIC BLOOD PRESSURE: 82 MMHG | HEART RATE: 77 BPM | BODY MASS INDEX: 51.91 KG/M2 | HEIGHT: 63 IN | SYSTOLIC BLOOD PRESSURE: 130 MMHG

## 2023-09-25 DIAGNOSIS — Z11.4 SCREENING FOR HIV (HUMAN IMMUNODEFICIENCY VIRUS): ICD-10-CM

## 2023-09-25 DIAGNOSIS — Z11.59 NEED FOR HEPATITIS C SCREENING TEST: ICD-10-CM

## 2023-09-25 DIAGNOSIS — J02.9 ACUTE PHARYNGITIS, UNSPECIFIED ETIOLOGY: Primary | ICD-10-CM

## 2023-09-25 DIAGNOSIS — Z12.4 SCREENING FOR CERVICAL CANCER: ICD-10-CM

## 2023-09-25 PROCEDURE — 99214 OFFICE O/P EST MOD 30 MIN: CPT | Performed by: INTERNAL MEDICINE

## 2023-09-25 RX ORDER — AZITHROMYCIN 250 MG/1
TABLET, FILM COATED ORAL
Qty: 6 TABLET | Refills: 0 | Status: SHIPPED | OUTPATIENT
Start: 2023-09-25 | End: 2023-09-30

## 2023-09-25 NOTE — PROGRESS NOTES
INTERNAL MEDICINE OFFICE VISIT  [facility] Internal Medicine- [place]    NAME: Polly Gayle  AGE: 39 y.o. SEX: female    DATE OF ENCOUNTER: 9/25/2023    Assessment and Plan/History of Present Illness     Here today for same-day visit. Medical history of hypertension, anxiety/depression/PTSD, insomnia, migraine, prediabetes    Onset of symptoms several days ago. Hoarseness, sore throat, congestion. Denies any fevers, neck swelling, dysphagia. Not aware of any sick contacts. She is afebrile today. She has been using Tylenol and nebulizer machine at home which has been helping. Lungs clear to auscultation. No obvious cervical LAD apathy or tonsillar exudate appreciated. Apparently gets this infection every year and usually improves with antibiotics      Plan:  -Acute pharyngitis, possible viral etiology. She has a penicillin allergy noted, will treat with Z-Robert. Otherwise continue with Tylenol, supportive care, warm salt water gargles, lozenges. Advised her to let me know if symptoms do not improve        1. Acute pharyngitis, unspecified etiology  -     azithromycin (Zithromax) 250 mg tablet; Take 2 tablets (500 mg total) by mouth daily for 1 day, THEN 1 tablet (250 mg total) daily for 4 days. 2. Need for hepatitis C screening test    3. Screening for HIV (human immunodeficiency virus)    4. Screening for cervical cancer              No orders of the defined types were placed in this encounter.       Chief Complaint     Chief Complaint   Patient presents with   • Cough     Lost of voice /sore throat/ asthma       Review of Systems     10 point ROS negative except per HPI    The following portions of the patient's history were reviewed and updated as appropriate: allergies, current medications, past family history, past medical history, past social history, past surgical history and problem list.    Active Problem List     Patient Active Problem List   Diagnosis   • Acute sinusitis   • Anxiety and depression   • Primary insomnia   • Prediabetes   • Elevated alkaline phosphatase level   • Class 3 severe obesity due to excess calories without serious comorbidity with body mass index (BMI) of 50.0 to 59.9 in adult [facility])   • Migraine   • PTSD (post-traumatic stress disorder)   • Essential hypertension   • Acute anxiety   • Chronic toe pain, right foot   • Swelling of right wrist   • Current episode of major depressive disorder without prior episode       Objective     /82 (BP Location: Left arm, Patient Position: Sitting, Cuff Size: Standard)   Pulse 77   Temp 98 °F (36.7 °C)   Resp 18   Ht 5' 3" (1.6 m)   Wt (!) 145 kg (319 lb)   LMP  (LMP Unknown) Comment: irregular periods  SpO2 95%   BMI 56.51 kg/m²     Physical Exam  HENT:      Mouth/Throat:      Pharynx: Posterior oropharyngeal erythema present. No oropharyngeal exudate. Cardiovascular:      Rate and Rhythm: Normal rate and regular rhythm. Heart sounds: No murmur heard. Pulmonary:      Effort: Pulmonary effort is normal.      Breath sounds: Normal breath sounds. No wheezing or rales. Pertinent Laboratory/Diagnostic Studies:  XR foot 3+ vw right    Result Date: 5/25/2023  Impression: No acute osseous abnormality. Workstation performed: [id number]       Images and diagnostics reviewed     Current Medications     Current Outpatient Medications:   •  albuterol (PROVENTIL HFA,VENTOLIN HFA) 90 mcg/act inhaler, Inhale 2 puffs every 4 (four) hours as needed, Disp: , Rfl:   •  azithromycin (Zithromax) 250 mg tablet, Take 2 tablets (500 mg total) by mouth daily for 1 day, THEN 1 tablet (250 mg total) daily for 4 days. , Disp: 6 tablet, Rfl: 0  •  furosemide (LASIX) 20 mg tablet, Take 1 tablet (20 mg total) by mouth daily as needed (For leg swelling), Disp: 30 tablet, Rfl: 0  •  hydrOXYzine HCL (ATARAX) 25 mg tablet, Take 1 tablet (25 mg total) by mouth every 6 (six) hours as needed for itching, Disp: 60 tablet, Rfl: 0  •  LORazepam (Ativan) 0.5 mg tablet, Take 1 tablet (0.5 mg total) by mouth daily as needed for anxiety Take 1 tablet 30 to 60 minutes prior to lab draw, Disp: 2 tablet, Rfl: 0  •  methylPREDNISolone 4 MG tablet therapy pack, Use as directed on package, Disp: 21 each, Rfl: 0  •  Multiple Vitamin (MULTIVITAMIN) tablet, Take 1 tablet by mouth daily, Disp: , Rfl:   •  nystatin (MYCOSTATIN) powder, Apply topically 3 (three) times a day, Disp: 15 g, Rfl: 0  •  olmesartan (BENICAR) 5 mg tablet, Take 2 tablets (10 mg total) by mouth daily, Disp: 180 tablet, Rfl: 0  •  rizatriptan (MAXALT) 5 MG tablet, Take 5 mg by mouth daily as needed, Disp: , Rfl:   •  traZODone (DESYREL) 50 mg tablet, Take 1 tablet (50 mg total) by mouth daily at bedtime, Disp: 90 tablet, Rfl: 0  •  zolpidem (AMBIEN) 5 mg tablet, Take 1 tablet (5 mg total) by mouth daily at bedtime as needed for sleep, Disp: 30 tablet, Rfl: 0    Health Maintenance     Health Maintenance   Topic Date Due   • Hepatitis C Screening  Never done   • COVID-19 Vaccine (1) Never done   • HIV Screening  Never done   • Cervical Cancer Screening  Never done   • Influenza Vaccine (1) 09/01/2023   • BMI: Followup Plan  03/03/2024   • Depression Remission PHQ  01/13/2024   • Annual Physical  04/05/2024   • Breast Cancer Screening: Mammogram  04/12/2024   • BMI: Adult  09/25/2024   • DTaP,Tdap,and Td Vaccines (2 - Td or Tdap) 12/25/2024   • Pneumococcal Vaccine: Pediatrics (0 to 5 Years) and At-Risk Patients (6 to 59 Years)  Aged Out   • HIB Vaccine  Aged Out   • IPV Vaccine  Aged Out   • Hepatitis A Vaccine  Aged Out   • Meningococcal ACWY Vaccine  Aged Out   • HPV Vaccine  Aged [employer] History   Administered Date(s) Administered   • INFLUENZA 04/29/2020, 12/07/2021, 02/06/2022   • Tdap 12/25/2014       Garrick Machuca D.O.   [facility] Internal Medicine [facility]  [address], [facility] [address]  [facility], [address] [facility]  Office: [phone]  Fax: [phone]

## 2023-09-25 NOTE — PATIENT INSTRUCTIONS
Please continue with your nebulizer at home. Begin taking azithromycin as prescribed for 5 days.   For the sore throat you can continue with Tylenol as needed, lozenges such as Cepacol, throat spray such as Chloraseptic spray, warm salt water gargles, warm liquids

## 2023-09-29 DIAGNOSIS — F51.01 PRIMARY INSOMNIA: ICD-10-CM

## 2023-09-29 DIAGNOSIS — R60.9 PERIPHERAL EDEMA: ICD-10-CM

## 2023-09-29 DIAGNOSIS — F32.9 CURRENT EPISODE OF MAJOR DEPRESSIVE DISORDER WITHOUT PRIOR EPISODE, UNSPECIFIED DEPRESSION EPISODE SEVERITY: ICD-10-CM

## 2023-09-29 RX ORDER — TRAZODONE HYDROCHLORIDE 50 MG/1
50 TABLET ORAL
Qty: 90 TABLET | Refills: 0 | Status: SHIPPED | OUTPATIENT
Start: 2023-09-29

## 2023-09-29 RX ORDER — FUROSEMIDE 20 MG/1
20 TABLET ORAL DAILY PRN
Qty: 30 TABLET | Refills: 0 | Status: SHIPPED | OUTPATIENT
Start: 2023-09-29

## 2023-10-13 ENCOUNTER — HOSPITAL ENCOUNTER (EMERGENCY)
Facility: HOSPITAL | Age: 43
Discharge: HOME | End: 2023-10-13
Attending: EMERGENCY MEDICINE

## 2023-10-13 ENCOUNTER — APPOINTMENT (EMERGENCY)
Dept: RADIOLOGY | Facility: HOSPITAL | Age: 43
End: 2023-10-13

## 2023-10-13 VITALS
SYSTOLIC BLOOD PRESSURE: 182 MMHG | DIASTOLIC BLOOD PRESSURE: 77 MMHG | OXYGEN SATURATION: 100 % | HEART RATE: 75 BPM | RESPIRATION RATE: 18 BRPM | TEMPERATURE: 97.8 F

## 2023-10-13 DIAGNOSIS — M25.532 LEFT WRIST PAIN: Primary | ICD-10-CM

## 2023-10-13 PROCEDURE — 99284 EMERGENCY DEPT VISIT MOD MDM: CPT | Performed by: EMERGENCY MEDICINE

## 2023-10-13 PROCEDURE — 73110 X-RAY EXAM OF WRIST: CPT

## 2023-10-13 PROCEDURE — 99283 EMERGENCY DEPT VISIT LOW MDM: CPT

## 2023-10-13 RX ORDER — ACETAMINOPHEN 325 MG/1
975 TABLET ORAL ONCE
Status: COMPLETED | OUTPATIENT
Start: 2023-10-13 | End: 2023-10-13

## 2023-10-13 RX ORDER — OXYCODONE HYDROCHLORIDE 5 MG/1
5 TABLET ORAL ONCE
Status: COMPLETED | OUTPATIENT
Start: 2023-10-13 | End: 2023-10-13

## 2023-10-13 RX ADMIN — OXYCODONE HYDROCHLORIDE 5 MG: 5 TABLET ORAL at 21:57

## 2023-10-13 RX ADMIN — ACETAMINOPHEN 975 MG: 325 TABLET, FILM COATED ORAL at 20:54

## 2023-10-14 DIAGNOSIS — L30.4 INTERTRIGO: ICD-10-CM

## 2023-10-14 DIAGNOSIS — J45.909 ASTHMA, UNSPECIFIED ASTHMA SEVERITY, UNSPECIFIED WHETHER COMPLICATED, UNSPECIFIED WHETHER PERSISTENT: Primary | ICD-10-CM

## 2023-10-14 NOTE — ED PROVIDER NOTES
History  Chief Complaint   Patient presents with    Wrist Injury     Pt fell out of bathtub, lost footing on door track, hit left wrist on wooden door, - [facility] -, 10/10 pain      HPI  Patient is a 39 y.o. ambidextrous female with history of no relevant past medical history presenting to the emergency department for left wrist injury. Patient states that just prior to arrival she fell out of the bathtub and hit her left wrist on the door. Denies any head injury or other injuries at this time. Patient does not take any blood thinning medications. Complains having pain in her left wrist which is worse with movement. Did not take anything for pain prior to arrival.     Prior to Admission Medications   Prescriptions Last Dose Informant Patient Reported? Taking?    LORazepam (Ativan) 0.5 mg tablet  Self No No   Sig: Take 1 tablet (0.5 mg total) by mouth daily as needed for anxiety Take 1 tablet 30 to 60 minutes prior to lab draw   Multiple Vitamin (MULTIVITAMIN) tablet  Self Yes No   Sig: Take 1 tablet by mouth daily   albuterol (PROVENTIL HFA,VENTOLIN HFA) 90 mcg/act inhaler  Self Yes No   Sig: Inhale 2 puffs every 4 (four) hours as needed   furosemide (LASIX) 20 mg tablet   No No   Sig: Take 1 tablet (20 mg total) by mouth daily as needed (For leg swelling)   hydrOXYzine HCL (ATARAX) 25 mg tablet   No No   Sig: Take 1 tablet (25 mg total) by mouth every 6 (six) hours as needed for itching   methylPREDNISolone 4 MG tablet therapy pack   No No   Sig: Use as directed on package   nystatin (MYCOSTATIN) powder   No No   Sig: Apply topically 3 (three) times a day   olmesartan (BENICAR) 5 mg tablet   No No   Sig: Take 2 tablets (10 mg total) by mouth daily   rizatriptan (MAXALT) 5 MG tablet   Yes No   Sig: Take 5 mg by mouth daily as needed   traZODone (DESYREL) 50 mg tablet   No No   Sig: Take 1 tablet (50 mg total) by mouth daily at bedtime   zolpidem (AMBIEN) 5 mg tablet   No No   Sig: Take 1 tablet (5 mg total) by mouth daily at bedtime as needed for sleep      Facility-Administered Medications: None       Past Medical History:   Diagnosis Date    Allergic     Asthma     seasonal asthma    Essential hypertension 3/17/2023    Insomnia     Migraine     Neuromuscular disorder (HCC)     RLS with PLMD    Restless leg syndrome        Past Surgical History:   Procedure Laterality Date    ADENOIDECTOMY       SECTION      PA EXCISION GANGLION WRIST DORSAL/VOLAR PRIMARY Right 2017    Procedure: WRIST EXCISION OF VOLAR GANGLION CYST ;  Surgeon: Elijah Rehman MD;  Location: [facility];  Service: Orthopedics    TONSILECTOMY AND ADNOIDECTOMY      TONSILLECTOMY         History reviewed. No pertinent family history. I have reviewed and agree with the history as documented. E-Cigarette/Vaping     E-Cigarette/Vaping Substances     Social History     Tobacco Use    Smoking status: Never    Smokeless tobacco: Never   Substance Use Topics    Alcohol use: No    Drug use: No        Review of Systems   Constitutional:  Negative for chills and fever. HENT:  Negative for congestion. Eyes:  Negative for redness. Respiratory:  Negative for cough and shortness of breath. Cardiovascular:  Negative for chest pain and palpitations. Gastrointestinal:  Negative for abdominal pain, diarrhea and vomiting. Endocrine: Negative for polyuria. Genitourinary:  Negative for dysuria and hematuria. Musculoskeletal:  Negative for back pain. Left wrist pain   Skin:  Negative for rash. Neurological:  Negative for light-headedness and headaches. All other systems reviewed and are negative.       Physical Exam  ED Triage Vitals   Temperature Pulse Respirations Blood Pressure SpO2   10/13/23 1945 10/13/23 1945 10/13/23 1945 10/13/23 1945 10/13/23 1945   97.8 °F (36.6 °C) 75 18 (!) 182/77 100 %      Temp Source Heart Rate Source Patient Position - Orthostatic VS BP Location FiO2 (%)   10/13/23 1945 -- -- -- --   Temporal Pain Score       10/13/23 2054       10 - Worst Possible Pain             Orthostatic Vital Signs  Vitals:    10/13/23 1945   BP: (!) 182/77   Pulse: 75       Physical Exam  Vitals and nursing note reviewed. Constitutional:       General: She is not in acute distress. Appearance: She is not ill-appearing. HENT:      Head: Normocephalic and atraumatic. Mouth/Throat:      Mouth: Mucous membranes are moist.   Eyes:      Conjunctiva/sclera: Conjunctivae normal.   Cardiovascular:      Rate and Rhythm: Normal rate. Pulmonary:      Effort: Pulmonary effort is normal. No respiratory distress. Abdominal:      General: There is no distension. Musculoskeletal:      Cervical back: Neck supple. Comments: Tenderness to palpation distal left ulna. No deformity. Intact distal sensation and capillary refill. Pain with range of motion of wrist.  No tenderness to palpation or pain with range of motion of elbow. No skin breakage   Skin:     General: Skin is warm. Neurological:      General: No focal deficit present. Mental Status: She is alert. Psychiatric:         Mood and Affect: Mood normal.         ED Medications  Medications   acetaminophen (TYLENOL) tablet 975 mg (975 mg Oral Given 10/13/23 2054)   oxyCODONE (ROXICODONE) IR tablet 5 mg (5 mg Oral Given 10/13/23 2157)       Diagnostic Studies  Results Reviewed       None                   XR wrist 3+ views LEFT   Final Result by Catherine Lynch MD (10/13 2144)      No acute osseous abnormality. Workstation performed: [id number]               Procedures  Procedures      ED Course  ED Course as of 10/13/23 2240   Fri Oct 13, 2023   2144 Patient with continued pain. Awaiting formal XR read                                       Medical Decision Making  Amount and/or Complexity of Data Reviewed  Radiology: ordered. Risk  OTC drugs. Prescription drug management.     Patient is a 39 y.o. female with PMH of no relevant past medical history who presents to the ED with left wrist injury. Vital signs stable. On exam left distal ulna tenderness to palpation. History and physical exam most consistent with sprain/strain versus fracture. Will obtain x-ray. We will treat patient with Tylenol at this time as patient is allergic to ibuprofen. View ED course above for further discussion on patient workup. All labs reviewed and utilized in the medical decision making process  All radiology studies independently viewed by me and interpreted by the radiologist.  I reviewed all testing with the patient. Upon re-evaluation pain improved, patient placed in splint. I have reviewed the patient's vital signs, nursing notes, and other relevant tests/information. I had a detailed discussion with the patient regarding the history, exam findings, and any diagnostic results. Plan to discharge home in stable condition, follow up with primary care provider or orthopedics if symptoms do not improve or worsen. Discussed with patient who is agreeable to plan. I discussed discharge instructions, need for follow-up, and oral return precautions for what to return for in addition to the written return precautions and discharge instructions, specifically highlighting areas of special concern. The patient verbalized understanding of the discharge instructions and warnings that would necessitate return to the Emergency Department including worsening pain. All questions the patient had were answered prior to discharge to the best of my ability.          Disposition  Final diagnoses:   Left wrist pain     Time reflects when diagnosis was documented in both MDM as applicable and the Disposition within this note       Time User Action Codes Description Comment    10/13/2023  9:49 PM Jerome Sosa Add [O42.266] Left wrist pain           ED Disposition       ED Disposition   Discharge    Condition   Stable    Date/Time   Fri Oct 13, 2023  9:49 PM Comment   Logan Lind discharge to home/self care.                    Follow-up Information       Follow up With Specialties Details Why Contact Info Additional Information    Garrick Morris,  Internal Medicine   [address]  [address]  [facility] [address] [phone]       [address] [facility] Orthopedic Surgery Call  If symptoms worsen [address] [address]  [phone] [address] [facility], [address] [place], [place], [address] [place] [address] Av  Use Entrance A             Discharge Medication List as of 10/13/2023  9:50 PM        CONTINUE these medications which have NOT CHANGED    Details   albuterol (PROVENTIL HFA,VENTOLIN HFA) 90 mcg/act inhaler Inhale 2 puffs every 4 (four) hours as needed, Starting Mon 11/26/2018, Historical Med      furosemide (LASIX) 20 mg tablet Take 1 tablet (20 mg total) by mouth daily as needed (For leg swelling), Starting Fri 9/29/2023, Normal      hydrOXYzine HCL (ATARAX) 25 mg tablet Take 1 tablet (25 mg total) by mouth every 6 (six) hours as needed for itching, Starting Tue 7/25/2023, Normal      LORazepam (Ativan) 0.5 mg tablet Take 1 tablet (0.5 mg total) by mouth daily as needed for anxiety Take 1 tablet 30 to 60 minutes prior to lab draw, Starting Fri 3/17/2023, Normal      methylPREDNISolone 4 MG tablet therapy pack Use as directed on package, Normal      Multiple Vitamin (MULTIVITAMIN) tablet Take 1 tablet by mouth daily, Historical Med      nystatin (MYCOSTATIN) powder Apply topically 3 (three) times a day, Starting Tue 7/25/2023, Normal      olmesartan (BENICAR) 5 mg tablet Take 2 tablets (10 mg total) by mouth daily, Starting Wed 6/7/2023, Normal      rizatriptan (MAXALT) 5 MG tablet Take 5 mg by mouth daily as needed, Starting Mon 1/29/2018, Until Mon 9/25/2023 at 2359, Historical Med      traZODone (DESYREL) 50 mg tablet Take 1 tablet (50 mg total) by mouth daily at bedtime, Starting Fri 9/29/2023, Normal      zolpidem (AMBIEN) 5 mg tablet Take 1 tablet (5 mg total) by mouth daily at bedtime as needed for sleep, Starting Tue 5/2/2023, Normal               PDMP Review         Value Time User    PDMP Reviewed  Yes 5/2/2023  3:33 PM Garrick Wiggins DO             ED Provider  Attending physically available and evaluated Christopher Uribe. I managed the patient along with the ED Attending.     Electronically Signed by           Bin Almanza DO  10/13/23 1972

## 2023-10-14 NOTE — DISCHARGE INSTRUCTIONS
You were seen in the emergency department today for left wrist pain. Your testing showed no evidence of fracture. Follow up with your primary care provider. Follow up with Orthopedics if your pain does not improve. Take Tylenol as needed for pain. Use the provided splint as needed    Return to the emergency department for any new or concerning symptoms including worsening pain. Thank you for choosing [facility] for your care today.

## 2023-10-16 ENCOUNTER — TELEPHONE (OUTPATIENT)
Age: 43
End: 2023-10-16

## 2023-10-16 RX ORDER — NYSTATIN 100000 [USP'U]/G
POWDER TOPICAL 3 TIMES DAILY
Qty: 15 G | Refills: 0 | Status: SHIPPED | OUTPATIENT
Start: 2023-10-16

## 2023-10-16 RX ORDER — ALBUTEROL SULFATE 90 UG/1
2 AEROSOL, METERED RESPIRATORY (INHALATION) EVERY 4 HOURS PRN
Qty: 18 G | Refills: 1 | Status: SHIPPED | OUTPATIENT
Start: 2023-10-16

## 2023-10-16 NOTE — TELEPHONE ENCOUNTER
Patient is being referred to a orthopedics. Please schedule accordingly.     [facility]   [phone]

## 2023-10-30 NOTE — ED ATTENDING ATTESTATION
10/13/2023  IShad MD, saw and evaluated the patient. I have discussed the patient with the resident/non-physician practitioner and agree with the resident's/non-physician practitioner's findings, Plan of Care, and MDM as documented in the resident's/non-physician practitioner's note, except where noted. All available labs and Radiology studies were reviewed. I was present for key portions of any procedure(s) performed by the resident/non-physician practitioner and I was immediately available to provide assistance. At this point I agree with the current assessment done in the Emergency Department. I have conducted an independent evaluation of this patient a history and physical is as follows:    ED Course     Patient presents for evaluation due to wrist pain that started after she fell out of the bathtub and struck her left wrist on the door. Patient denies any additional injuries or complaints. A/P: Left wrist injury. Will check x-ray to evaluate for fracture.     Critical Care Time  Procedures

## 2023-11-01 ENCOUNTER — OFFICE VISIT (OUTPATIENT)
Dept: INTERNAL MEDICINE CLINIC | Facility: CLINIC | Age: 43
End: 2023-11-01
Payer: MEDICARE

## 2023-11-01 VITALS
HEART RATE: 80 BPM | RESPIRATION RATE: 18 BRPM | WEIGHT: 293 LBS | HEIGHT: 63 IN | TEMPERATURE: 97.9 F | SYSTOLIC BLOOD PRESSURE: 142 MMHG | BODY MASS INDEX: 51.91 KG/M2 | OXYGEN SATURATION: 92 % | DIASTOLIC BLOOD PRESSURE: 80 MMHG

## 2023-11-01 DIAGNOSIS — I10 ESSENTIAL HYPERTENSION: ICD-10-CM

## 2023-11-01 DIAGNOSIS — E66.01 CLASS 3 OBESITY (HCC): Primary | ICD-10-CM

## 2023-11-01 PROCEDURE — 99214 OFFICE O/P EST MOD 30 MIN: CPT | Performed by: INTERNAL MEDICINE

## 2023-11-01 RX ORDER — OLMESARTAN MEDOXOMIL 5 MG/1
10 TABLET ORAL DAILY
Qty: 180 TABLET | Refills: 1 | Status: SHIPPED | OUTPATIENT
Start: 2023-11-01

## 2023-11-01 NOTE — ASSESSMENT & PLAN NOTE
Patient previously weighed around 500 pounds at her heaviest.  She has had some success with weight loss but appears to have plateaued around 939 to 320 pounds. She has been maintaining healthy diet, walking frequently, eats lots of chicken, seafood, has cut out soda from her diet, does not eat red meat  -She is a candidate for pharmacotherapy with BMI greater than 30. She has not achieved weight loss goals with a trial of at least 3 to 6 months of lifestyle modification  -We reviewed weight loss medication options. She is most interested in [address] [place] [address]. Counseled on common side effects.   Advised her to check BP and heart rate 3 times weekly at home with phentermine use  -Follow-up in 3 months

## 2023-11-01 NOTE — PROGRESS NOTES
INTERNAL MEDICINE OFFICE VISIT  [facility] Internal Medicine- [place]    NAME: Christiano Pan  AGE: 39 y.o. SEX: female    DATE OF ENCOUNTER: 11/1/2023    Assessment and Plan/History of Present Illness     Here today for follow up. Medical history of hypertension, anxiety/depression/PTSD, insomnia, migraine, prediabetes       1. Class 3 obesity (HCC)  Assessment & Plan:  Patient previously weighed around 500 pounds at her heaviest.  She has had some success with weight loss but appears to have plateaued around 917 to 320 pounds. She has been maintaining healthy diet, walking frequently, eats lots of chicken, seafood, has cut out soda from her diet, does not eat red meat  -She is a candidate for pharmacotherapy with BMI greater than 30. She has not achieved weight loss goals with a trial of at least 3 to 6 months of lifestyle modification  -We reviewed weight loss medication options. She is most interested in [address] [place] [address]. Counseled on common side effects. Advised her to check BP and heart rate 3 times weekly at home with phentermine use  -Follow-up in 3 months    Orders:  -     Phentermine-Topiramate 3.75-23 MG CP24; Take 1 capsule by mouth daily for 14 days  -     Phentermine-Topiramate 7.5-46 MG CP24; Take 1 capsule by mouth daily Do not start before November 15, 2023.    2. Essential hypertension  Assessment & Plan:  Control is acceptable  Continue Benicar 10 mg daily for now    Orders:  -     olmesartan (BENICAR) 5 mg tablet; Take 2 tablets (10 mg total) by mouth daily              No orders of the defined types were placed in this encounter.       Chief Complaint     Chief Complaint   Patient presents with   • Follow-up       Review of Systems     10 point ROS negative except per HPI    The following portions of the patient's history were reviewed and updated as appropriate: allergies, current medications, past family history, past medical history, past social history, past surgical history and problem list.    Active Problem List     Patient Active Problem List   Diagnosis   • Acute sinusitis   • Anxiety and depression   • Primary insomnia   • Prediabetes   • Elevated alkaline phosphatase level   • Class 3 obesity (HCC)   • Migraine   • PTSD (post-traumatic stress disorder)   • Essential hypertension   • Acute anxiety   • Chronic toe pain, right foot   • Swelling of right wrist   • Current episode of major depressive disorder without prior episode       Objective     /80 (BP Location: Left arm, Patient Position: Sitting, Cuff Size: Standard)   Pulse 80   Temp 97.9 °F (36.6 °C)   Resp 18   Ht 5' 3" (1.6 m)   Wt (!) 148 kg (326 lb)   LMP  (LMP Unknown) Comment: irregular periods  SpO2 92%   BMI 57.75 kg/m²     Physical Exam    Pertinent Laboratory/Diagnostic Studies:  XR wrist 3+ views LEFT    Result Date: 10/13/2023  Impression: No acute osseous abnormality.  Workstation performed: [id number]       Images and diagnostics reviewed     Current Medications     Current Outpatient Medications:   •  albuterol (PROVENTIL HFA,VENTOLIN HFA) 90 mcg/act inhaler, Inhale 2 puffs every 4 (four) hours as needed for wheezing, Disp: 18 g, Rfl: 1  •  furosemide (LASIX) 20 mg tablet, Take 1 tablet (20 mg total) by mouth daily as needed (For leg swelling), Disp: 30 tablet, Rfl: 0  •  hydrOXYzine HCL (ATARAX) 25 mg tablet, Take 1 tablet (25 mg total) by mouth every 6 (six) hours as needed for itching, Disp: 60 tablet, Rfl: 0  •  LORazepam (Ativan) 0.5 mg tablet, Take 1 tablet (0.5 mg total) by mouth daily as needed for anxiety Take 1 tablet 30 to 60 minutes prior to lab draw, Disp: 2 tablet, Rfl: 0  •  Multiple Vitamin (MULTIVITAMIN) tablet, Take 1 tablet by mouth daily, Disp: , Rfl:   •  nystatin (MYCOSTATIN) powder, Apply topically 3 (three) times a day, Disp: 15 g, Rfl: 0  •  olmesartan (BENICAR) 5 mg tablet, Take 2 tablets (10 mg total) by mouth daily, Disp: 180 tablet, Rfl: 1  •  Phentermine-Topiramate 3.75-23 MG CP24, Take 1 capsule by mouth daily for 14 days, Disp: 14 capsule, Rfl: 0  •  [START ON 11/15/2023] Phentermine-Topiramate 7.5-46 MG CP24, Take 1 capsule by mouth daily Do not start before November 15, 2023., Disp: 90 capsule, Rfl: 0  •  traZODone (DESYREL) 50 mg tablet, Take 1 tablet (50 mg total) by mouth daily at bedtime, Disp: 90 tablet, Rfl: 0  •  zolpidem (AMBIEN) 5 mg tablet, Take 1 tablet (5 mg total) by mouth daily at bedtime as needed for sleep, Disp: 30 tablet, Rfl: 0    Health Maintenance     Health Maintenance   Topic Date Due   • Hepatitis C Screening  Never done   • COVID-19 Vaccine (1) Never done   • Pneumococcal Vaccine: Pediatrics (0 to 5 Years) and At-Risk Patients (6 to 59 Years) (1 - PCV) Never done   • HIV Screening  Never done   • Cervical Cancer Screening  Never done   • Influenza Vaccine (1) 09/01/2023   • Depression Remission PHQ  01/13/2024   • Annual Physical  04/05/2024   • Breast Cancer Screening: Mammogram  04/12/2024   • BMI: Followup Plan  11/01/2024   • BMI: Adult  11/01/2024   • DTaP,Tdap,and Td Vaccines (2 - Td or Tdap) 12/25/2024   • HIB Vaccine  Aged Out   • IPV Vaccine  Aged Out   • Hepatitis A Vaccine  Aged Out   • Meningococcal ACWY Vaccine  Aged Out   • HPV Vaccine  Aged Out     Immunization History   Administered Date(s) Administered   • INFLUENZA 04/29/2020, 12/07/2021, 02/06/2022   • Tdap 12/25/2014       Garrick Jimenez D.O.   [facility] Internal Medicine [place] [address], [facility] [id number]  [facility], [address] [place] [address]  Office: [phone]  Fax: [phone] [place]

## 2023-11-08 NOTE — TELEPHONE ENCOUNTER
Hi yes my name is Andres Durham, birthday [birth year]. I spoke to pharmacist last week and they said they sent in a prior authorization to get my medication filled, that Tl Ricardo was cocky prescribed and they still haven't heard back from them and they asked if I could reach out to make sure you got the message. Actually almost [phone] if someone could get back to me. Thanks. Have a great day. Bye. You received a voice mail from [facility]. Message given to Tiffany Loving 11.8.23 so she can update Deisy Rainey on the prior authorization.

## 2023-11-11 ENCOUNTER — HOSPITAL ENCOUNTER (EMERGENCY)
Facility: HOSPITAL | Age: 43
Discharge: HOME | End: 2023-11-12
Attending: EMERGENCY MEDICINE | Admitting: EMERGENCY MEDICINE
Payer: MEDICARE

## 2023-11-11 VITALS
HEART RATE: 74 BPM | DIASTOLIC BLOOD PRESSURE: 100 MMHG | RESPIRATION RATE: 18 BRPM | OXYGEN SATURATION: 99 % | SYSTOLIC BLOOD PRESSURE: 196 MMHG | TEMPERATURE: 97.4 F

## 2023-11-11 DIAGNOSIS — R03.0 ELEVATED BLOOD PRESSURE READING: ICD-10-CM

## 2023-11-11 DIAGNOSIS — G43.909 MIGRAINE: Primary | ICD-10-CM

## 2023-11-11 PROCEDURE — 99283 EMERGENCY DEPT VISIT LOW MDM: CPT

## 2023-11-11 PROCEDURE — 99284 EMERGENCY DEPT VISIT MOD MDM: CPT | Performed by: EMERGENCY MEDICINE

## 2023-11-11 PROCEDURE — 96375 TX/PRO/DX INJ NEW DRUG ADDON: CPT

## 2023-11-11 PROCEDURE — 96365 THER/PROPH/DIAG IV INF INIT: CPT

## 2023-11-11 RX ORDER — ONDANSETRON 2 MG/ML
4 INJECTION INTRAMUSCULAR; INTRAVENOUS ONCE
Status: COMPLETED | OUTPATIENT
Start: 2023-11-11 | End: 2023-11-11

## 2023-11-11 RX ORDER — DEXAMETHASONE SODIUM PHOSPHATE 10 MG/ML
10 INJECTION, SOLUTION INTRAMUSCULAR; INTRAVENOUS ONCE
Status: COMPLETED | OUTPATIENT
Start: 2023-11-11 | End: 2023-11-11

## 2023-11-11 RX ORDER — MAGNESIUM SULFATE HEPTAHYDRATE 40 MG/ML
2 INJECTION, SOLUTION INTRAVENOUS ONCE
Status: COMPLETED | OUTPATIENT
Start: 2023-11-11 | End: 2023-11-12

## 2023-11-11 RX ORDER — ACETAMINOPHEN 325 MG/1
975 TABLET ORAL ONCE
Status: COMPLETED | OUTPATIENT
Start: 2023-11-11 | End: 2023-11-11

## 2023-11-11 RX ADMIN — ONDANSETRON 4 MG: 2 INJECTION INTRAMUSCULAR; INTRAVENOUS at 23:57

## 2023-11-11 RX ADMIN — DEXAMETHASONE SODIUM PHOSPHATE 10 MG: 10 INJECTION, SOLUTION INTRAMUSCULAR; INTRAVENOUS at 23:57

## 2023-11-11 RX ADMIN — ACETAMINOPHEN 975 MG: 325 TABLET, FILM COATED ORAL at 23:58

## 2023-11-11 RX ADMIN — MAGNESIUM SULFATE HEPTAHYDRATE 2 G: 40 INJECTION, SOLUTION INTRAVENOUS at 23:57

## 2023-11-12 RX ADMIN — SODIUM CHLORIDE 1000 ML: 0.9 INJECTION, SOLUTION INTRAVENOUS at 00:02

## 2023-11-12 NOTE — DISCHARGE INSTRUCTIONS
Follow-up with primary care in regards to elevated blood pressure reading and migraine headaches.  If symptoms worsen or persist please return to the ER for further evaluation and management

## 2023-11-12 NOTE — ED ATTENDING ATTESTATION
11/11/2023  I, Rodrigo Watts MD, saw and evaluated the patient. I have discussed the patient with the resident/non-physician practitioner and agree with the resident's/non-physician practitioner's findings, Plan of Care, and MDM as documented in the resident's/non-physician practitioner's note, except where noted. All available labs and Radiology studies were reviewed. I was present for key portions of any procedure(s) performed by the resident/non-physician practitioner and I was immediately available to provide assistance. At this point I agree with the current assessment done in the Emergency Department. I have conducted an independent evaluation of this patient a history and physical is as follows:    79-year-old female with history of headaches presents to the emergency department for evaluation of typical headache. Patient endorses a left-sided headache that has been present for the past few days. Is intermittent. Is associated with some nausea, but no vomiting. No blurry vision or double vision. No trauma or falls. No blood thinners. No fevers or chills. On exam, patient was comfortably bed in no acute distress, head is normocephalic atraumatic, pupils equal round reactive to light, neck is supple without meningismus signs, heart is regular rate and rhythm with intact distal pulses, no increased work of breathing, respiratory distress, or stridor. No focal neurologic deficits present. Exam and history consistent with typical benign headache, there are no signs of infectious process. No focal neurologic deficits present. Plan to treat symptomatically and likely discharge home if symptoms improved.     ED Course         Critical Care Time  Procedures

## 2023-11-12 NOTE — ED PROVIDER NOTES
History  Chief Complaint   Patient presents with    Headache - Recurrent or Known Dx Migraines     L sided. x3d, intermittent. Took pain rx w.o relief. Patient is a 78-year-old female past medical history migraines that presents for evaluation of of headache. Patient reports that her symptoms began 3 days ago states that she gets a left-sided frontal headache that is described as sharp nonradiating occurs at night with associated photophobia. Patient reports that her episodes typically last for about half hour before resolving states that tonight her symptoms have lasted for over an hour without relief. Denies any vision changes numbness weakness tingling chest pain shortness of breath abdominal pain back pain neck pain fever chills or any other complaints at this time. That she took all medications with out relief          Prior to Admission Medications   Prescriptions Last Dose Informant Patient Reported? Taking? LORazepam (Ativan) 0.5 mg tablet  Self No No   Sig: Take 1 tablet (0.5 mg total) by mouth daily as needed for anxiety Take 1 tablet 30 to 60 minutes prior to lab draw   Multiple Vitamin (MULTIVITAMIN) tablet  Self Yes No   Sig: Take 1 tablet by mouth daily   Phentermine-Topiramate 3.75-23 MG CP24   No No   Sig: Take 1 capsule by mouth daily for 14 days   Phentermine-Topiramate 7.5-46 MG CP24   No No   Sig: Take 1 capsule by mouth daily Do not start before November 15, 2023.    albuterol (PROVENTIL HFA,VENTOLIN HFA) 90 mcg/act inhaler   No No   Sig: Inhale 2 puffs every 4 (four) hours as needed for wheezing   furosemide (LASIX) 20 mg tablet   No No   Sig: Take 1 tablet (20 mg total) by mouth daily as needed (For leg swelling)   hydrOXYzine HCL (ATARAX) 25 mg tablet   No No   Sig: Take 1 tablet (25 mg total) by mouth every 6 (six) hours as needed for itching   nystatin (MYCOSTATIN) powder   No No   Sig: Apply topically 3 (three) times a day   olmesartan (BENICAR) 5 mg tablet   No No   Sig: Take 2 tablets (10 mg total) by mouth daily   traZODone (DESYREL) 50 mg tablet   No No   Sig: Take 1 tablet (50 mg total) by mouth daily at bedtime   zolpidem (AMBIEN) 5 mg tablet   No No   Sig: Take 1 tablet (5 mg total) by mouth daily at bedtime as needed for sleep      Facility-Administered Medications: None       Past Medical History:   Diagnosis Date    Allergic     Asthma     seasonal asthma    Essential hypertension 3/17/2023    Insomnia     Migraine     Neuromuscular disorder (HCC)     RLS with PLMD    Restless leg syndrome        Past Surgical History:   Procedure Laterality Date    ADENOIDECTOMY       SECTION      VA EXCISION GANGLION WRIST DORSAL/VOLAR PRIMARY Right 2017    Procedure: WRIST EXCISION OF VOLAR GANGLION CYST ;  Surgeon: Baljinder Sharma MD;  Location: [facility];  Service: Orthopedics    TONSILECTOMY AND ADNOIDECTOMY      TONSILLECTOMY         History reviewed. No pertinent family history. I have reviewed and agree with the history as documented. E-Cigarette/Vaping     E-Cigarette/Vaping Substances     Social History     Tobacco Use    Smoking status: Never    Smokeless tobacco: Never   Substance Use Topics    Alcohol use: No    Drug use: No        Review of Systems   Constitutional:  Negative for chills and fever. HENT:  Negative for ear pain and sore throat. Eyes:  Positive for photophobia. Negative for pain and visual disturbance. Respiratory:  Negative for cough and shortness of breath. Cardiovascular:  Negative for chest pain and palpitations. Gastrointestinal:  Negative for abdominal pain, nausea and vomiting. Genitourinary:  Negative for dysuria and hematuria. Musculoskeletal:  Negative for arthralgias and back pain. Skin:  Negative for color change and rash. Neurological:  Positive for headaches. Negative for dizziness, tremors, seizures, syncope, facial asymmetry, speech difficulty, weakness, light-headedness and numbness.    All other systems reviewed and are negative. Physical Exam  ED Triage Vitals [11/11/23 2243]   Temperature Pulse Respirations Blood Pressure SpO2   (!) 97.4 °F (36.3 °C) 74 18 (!) 196/100 99 %      Temp Source Heart Rate Source Patient Position - Orthostatic VS BP Location FiO2 (%)   Temporal -- -- -- --      Pain Score       9             Orthostatic Vital Signs  Vitals:    11/11/23 2243   BP: (!) 196/100   Pulse: 74       Physical Exam  Vitals and nursing note reviewed. Constitutional:       General: She is not in acute distress. Appearance: She is well-developed. She is obese. She is not ill-appearing, toxic-appearing or diaphoretic. HENT:      Head: Normocephalic and atraumatic. Mouth/Throat:      Mouth: Mucous membranes are moist.   Eyes:      Extraocular Movements: Extraocular movements intact. Conjunctiva/sclera: Conjunctivae normal.      Pupils: Pupils are equal, round, and reactive to light. Cardiovascular:      Rate and Rhythm: Normal rate and regular rhythm. Heart sounds: No murmur heard. Pulmonary:      Effort: Pulmonary effort is normal. No respiratory distress. Breath sounds: Normal breath sounds. Abdominal:      Palpations: Abdomen is soft. Tenderness: There is no abdominal tenderness. Musculoskeletal:         General: Normal range of motion. Cervical back: Normal range of motion and neck supple. Right lower leg: No edema. Left lower leg: No edema. Skin:     General: Skin is warm and dry. Capillary Refill: Capillary refill takes less than 2 seconds. Neurological:      General: No focal deficit present. Mental Status: She is alert and oriented to person, place, and time. Cranial Nerves: No cranial nerve deficit. Sensory: No sensory deficit. Motor: No weakness.       Coordination: Coordination normal.   Psychiatric:         Mood and Affect: Mood normal.         ED Medications  Medications   dexamethasone (PF) (DECADRON) injection 10 mg (10 mg Intravenous Given 11/11/23 2357)   sodium chloride 0.9 % bolus 1,000 mL (0 mL Intravenous Stopped 11/12/23 0129)   magnesium sulfate 2 g/50 mL IVPB (premix) 2 g (0 g Intravenous Stopped 11/12/23 0123)   ondansetron (ZOFRAN) injection 4 mg (4 mg Intravenous Given 11/11/23 2357)   acetaminophen (TYLENOL) tablet 975 mg (975 mg Oral Given 11/11/23 2358)       Diagnostic Studies  Results Reviewed       None                   No orders to display         Procedures  US Guided Peripheral IV    Date/Time: 11/11/2023 11:53 PM    Performed by: Madan Fajardo DO  Authorized by: Madan Fajardo DO    Patient location:  ED  Performed by:  Resident  Other Assisting Provider: No    Indications:     Indications: difficulty obtaining IV access      Image availability:  Not saved  Procedure details:     Location:  Left antecubital    Catheter size:  18 gauge    Number of attempts:  1    Successful placement: yes    Post-procedure details:     Post-procedure:  Dressing applied    Assessment: free fluid flow and no signs of infiltration      Post-procedure complications: none      Patient tolerance of procedure: Tolerated well, no immediate complications        ED Course                             SBIRT 20yo+      Flowsheet Row Most Recent Value   Initial Alcohol Screen: US AUDIT-C     1. How often do you have a drink containing alcohol? 0 Filed at: 11/11/2023 2244   2. How many drinks containing alcohol do you have on a typical day you are drinking? 0 Filed at: 11/11/2023 2244   3b. FEMALE Any Age, or MALE 65+: How often do you have 4 or more drinks on one occassion? 0 Filed at: 11/11/2023 2244   Audit-C Score 0 Filed at: 11/11/2023 2244   AVIS: How many times in the past year have you. .. Used an illegal drug or used a prescription medication for non-medical reasons?  Never Filed at: 11/11/2023 2244                  Medical Decision Making  Patient is a 27-year-old female presenting for evaluation of headache    Symptoms consistent with patient's typical migraine headache believe patient has primary headache no signs or symptoms of secondary headache. Benign neuro exam    Will treat symptomatically and reassess, no need for labs or imaging at this time    After medications patient reports improvement to symptoms is hemodynamically stable and cleared for discharge with outpatient follow-up with her PCP. Return precautions given patient verbalized understanding    Risk  OTC drugs. Prescription drug management. Disposition  Final diagnoses:   Migraine   Elevated blood pressure reading     Time reflects when diagnosis was documented in both MDM as applicable and the Disposition within this note       Time User Action Codes Description Comment    11/11/2023 11:53 PM Mike Strauss Add [D52.169] Migraine     11/12/2023  1:16 AM Kashif Hamilton Add [R03.0] Elevated blood pressure reading           ED Disposition       ED Disposition   Discharge    Condition   Stable    Date/Time   Sun Nov 12, 2023 [address] discharge to home/self care.                    Follow-up Information       Follow up With Specialties Details Why Contact Info Additional [address] [facility] Emergency Department Emergency Medicine  If symptoms worsen [address]  [phone]  [facility], [address] [place] [address], [address] [facility] [place][note 2],  Internal Medicine Schedule an appointment as soon as possible for a visit   [facility] [id number] [phone]               Discharge Medication List as of 11/12/2023  1:23 AM        CONTINUE these medications which have NOT CHANGED    Details   albuterol (PROVENTIL HFA,VENTOLIN HFA) 90 mcg/act inhaler Inhale 2 puffs every 4 (four) hours as needed for wheezing, Starting Mon 10/16/2023, Normal      furosemide (LASIX) 20 mg tablet Take 1 tablet (20 mg total) by mouth daily as needed (For leg swelling), Starting Fri 9/29/2023, Normal      hydrOXYzine HCL (ATARAX) 25 mg tablet Take 1 tablet (25 mg total) by mouth every 6 (six) hours as needed for itching, Starting Tue 7/25/2023, Normal      LORazepam (Ativan) 0.5 mg tablet Take 1 tablet (0.5 mg total) by mouth daily as needed for anxiety Take 1 tablet 30 to 60 minutes prior to lab draw, Starting Fri 3/17/2023, Normal      Multiple Vitamin (MULTIVITAMIN) tablet Take 1 tablet by mouth daily, Historical Med      nystatin (MYCOSTATIN) powder Apply topically 3 (three) times a day, Starting Mon 10/16/2023, Normal      olmesartan (BENICAR) 5 mg tablet Take 2 tablets (10 mg total) by mouth daily, Starting Wed 11/1/2023, Normal      Phentermine-Topiramate 3.75-23 MG CP24 Take 1 capsule by mouth daily for 14 days, Starting Wed 11/1/2023, Until Wed 11/15/2023, Normal      Phentermine-Topiramate 7.5-46 MG CP24 Take 1 capsule by mouth daily Do not start before November 15, 2023., Starting Wed 11/15/2023, Until Tue 2/13/2024, Normal      traZODone (DESYREL) 50 mg tablet Take 1 tablet (50 mg total) by mouth daily at bedtime, Starting Fri 9/29/2023, Normal      zolpidem (AMBIEN) 5 mg tablet Take 1 tablet (5 mg total) by mouth daily at bedtime as needed for sleep, Starting Tue 5/2/2023, Normal           No discharge procedures on file. PDMP Review         Value Time User    PDMP Reviewed  Yes 5/2/2023  3:33 PM Garrick Lizarraga DO             ED Provider  Attending physically available and evaluated Osmany Ventura. I managed the patient along with the ED Attending.     Electronically Signed by           Mike Strauss DO  11/12/23 9409

## 2023-11-13 ENCOUNTER — OFFICE VISIT (OUTPATIENT)
Dept: INTERNAL MEDICINE CLINIC | Facility: CLINIC | Age: 43
End: 2023-11-13
Payer: MEDICARE

## 2023-11-13 VITALS
RESPIRATION RATE: 18 BRPM | DIASTOLIC BLOOD PRESSURE: 90 MMHG | SYSTOLIC BLOOD PRESSURE: 140 MMHG | TEMPERATURE: 97.6 F | WEIGHT: 293 LBS | HEIGHT: 63 IN | BODY MASS INDEX: 51.91 KG/M2 | HEART RATE: 101 BPM | OXYGEN SATURATION: 97 %

## 2023-11-13 DIAGNOSIS — G43.719 INTRACTABLE CHRONIC MIGRAINE WITHOUT AURA AND WITHOUT STATUS MIGRAINOSUS: Primary | ICD-10-CM

## 2023-11-13 PROCEDURE — 99214 OFFICE O/P EST MOD 30 MIN: CPT | Performed by: INTERNAL MEDICINE

## 2023-11-13 RX ORDER — PROCHLORPERAZINE MALEATE 10 MG
10 TABLET ORAL EVERY 8 HOURS PRN
Qty: 30 TABLET | Refills: 0 | Status: SHIPPED | OUTPATIENT
Start: 2023-11-13

## 2023-11-13 RX ORDER — SUMATRIPTAN 100 MG/1
100 TABLET, FILM COATED ORAL AS NEEDED
Qty: 9 TABLET | Refills: 0 | Status: SHIPPED | OUTPATIENT
Start: 2023-11-13

## 2023-11-13 RX ORDER — PREDNISONE 10 MG/1
TABLET ORAL
Qty: 36 TABLET | Refills: 0 | Status: SHIPPED | OUTPATIENT
Start: 2023-11-13 | End: 2023-11-20

## 2023-11-13 NOTE — PROGRESS NOTES
INTERNAL MEDICINE OFFICE VISIT  [facility]- [place]    NAME: Sukumar Snyder  AGE: 39 y.o. SEX: female    DATE OF ENCOUNTER: 11/13/2023    Assessment and Plan/History of Present Illness     Here today for evaluation of headache    Medical history of hypertension, anxiety/depression/PTSD, insomnia, migraine, prediabetes     Patient reports worsening of her migraines within the past week or so. Pain occurring in the left frontal area of the head. She she also reports episodes of "whole body shaking" with dizziness/lightheadedness. She will at times feel disoriented and it will take her some time to get her bearings again. These episodes can last 30 seconds to 1 minute. No chest pain or palpitations. No bowel or bladder incontinence. She states it almost mimics "an extreme panic attack with migraine". She denies any new stressors. She states she has seen neuro before and there was some initial conversation about possible seizures but she did not have follow-up    Migraine was so severe that she had to go to the ER on 11/11. Received migraine cocktail with some relief but headache recurred after she returned home. Maxalt has not provided much relief    Plan:  -Treat with high-dose prednisone taper, Compazine. Can try Imitrex as needed in place of Maxalt  -Previous preventive meds: Amitriptyline, Topamax, propranolol  -She might be a good candidate for CGRP antagonist if her present symptoms do not improve  -Given worsening severity and frequency of migraines with the above episodes, would obtain updated MRI of the brain  -Low suspicion for seizure. Possible migraine phenomenon or aura? Could consider routine EEG and referral back to neurology if these episodes persist         1. Intractable chronic migraine without aura and without status migrainosus  -     MRI brain wo contrast; Future; Expected date: 11/13/2023  -     SUMAtriptan (Imitrex) 100 mg tablet;  Take 1 tablet (100 mg total) by mouth if needed for migraine Take at the onset of migraine; if symptoms continue or return, may take another dose at least 2 hours after first dose. Take no more than 2 doses in a day. -     predniSONE 10 mg tablet; Take 8 tablets (80 mg total) by mouth daily for 1 day, THEN 7 tablets (70 mg total) daily for 1 day, THEN 6 tablets (60 mg total) daily for 1 day, THEN 5 tablets (50 mg total) daily for 1 day, THEN 4 tablets (40 mg total) daily for 1 day, THEN 3 tablets (30 mg total) daily for 1 day, THEN 2 tablets (20 mg total) daily for 1 day, THEN 1 tablet (10 mg total) daily for 1 day. -     prochlorperazine (COMPAZINE) 10 mg tablet; Take 1 tablet (10 mg total) by mouth every 8 (eight) hours as needed for nausea or vomiting (Migraine)              Orders Placed This Encounter   Procedures   • MRI brain wo contrast         Chief Complaint     No chief complaint on file.       Review of Systems     10 point ROS negative except per HPI    The following portions of the patient's history were reviewed and updated as appropriate: allergies, current medications, past family history, past medical history, past social history, past surgical history and problem list.    Active Problem List     Patient Active Problem List   Diagnosis   • Acute sinusitis   • Anxiety and depression   • Primary insomnia   • Prediabetes   • Elevated alkaline phosphatase level   • Class 3 obesity (HCC)   • Migraine   • PTSD (post-traumatic stress disorder)   • Essential hypertension   • Acute anxiety   • Chronic toe pain, right foot   • Swelling of right wrist   • Current episode of major depressive disorder without prior episode       Objective     /90 (BP Location: Left arm, Patient Position: Sitting, Cuff Size: Standard)   Pulse 101   Temp 97.6 °F (36.4 °C)   Resp 18   Ht 5' 3" (1.6 m)   Wt (!) 148 kg (327 lb)   LMP  (LMP Unknown) Comment: irregular periods  SpO2 97%   BMI 57.93 kg/m²     Physical Exam  Neurological:      Mental Status: She is oriented to person, place, and time. Mental status is at baseline. Gait: Gait normal.         Pertinent Laboratory/Diagnostic Studies:  No results found.     Images and diagnostics reviewed     Current Medications     Current Outpatient Medications:   •  albuterol (PROVENTIL HFA,VENTOLIN HFA) 90 mcg/act inhaler, Inhale 2 puffs every 4 (four) hours as needed for wheezing, Disp: 18 g, Rfl: 1  •  furosemide (LASIX) 20 mg tablet, Take 1 tablet (20 mg total) by mouth daily as needed (For leg swelling), Disp: 30 tablet, Rfl: 0  •  hydrOXYzine HCL (ATARAX) 25 mg tablet, Take 1 tablet (25 mg total) by mouth every 6 (six) hours as needed for itching, Disp: 60 tablet, Rfl: 0  •  LORazepam (Ativan) 0.5 mg tablet, Take 1 tablet (0.5 mg total) by mouth daily as needed for anxiety Take 1 tablet 30 to 60 minutes prior to lab draw, Disp: 2 tablet, Rfl: 0  •  Multiple Vitamin (MULTIVITAMIN) tablet, Take 1 tablet by mouth daily, Disp: , Rfl:   •  nystatin (MYCOSTATIN) powder, Apply topically 3 (three) times a day, Disp: 15 g, Rfl: 0  •  olmesartan (BENICAR) 5 mg tablet, Take 2 tablets (10 mg total) by mouth daily, Disp: 180 tablet, Rfl: 1  •  Phentermine-Topiramate 3.75-23 MG CP24, Take 1 capsule by mouth daily for 14 days, Disp: 14 capsule, Rfl: 0  •  [START ON 11/15/2023] Phentermine-Topiramate 7.5-46 MG CP24, Take 1 capsule by mouth daily Do not start before November 15, 2023., Disp: 90 capsule, Rfl: 0  •  predniSONE 10 mg tablet, Take 8 tablets (80 mg total) by mouth daily for 1 day, THEN 7 tablets (70 mg total) daily for 1 day, THEN 6 tablets (60 mg total) daily for 1 day, THEN 5 tablets (50 mg total) daily for 1 day, THEN 4 tablets (40 mg total) daily for 1 day, THEN 3 tablets (30 mg total) daily for 1 day, THEN 2 tablets (20 mg total) daily for 1 day, THEN 1 tablet (10 mg total) daily for 1 day., Disp: 36 tablet, Rfl: 0  •  prochlorperazine (COMPAZINE) 10 mg tablet, Take 1 tablet (10 mg total) by mouth every 8 (eight) hours as needed for nausea or vomiting (Migraine), Disp: 30 tablet, Rfl: 0  •  SUMAtriptan (Imitrex) 100 mg tablet, Take 1 tablet (100 mg total) by mouth if needed for migraine Take at the onset of migraine; if symptoms continue or return, may take another dose at least 2 hours after first dose. Take no more than 2 doses in a day., Disp: 9 tablet, Rfl: 0  •  traZODone (DESYREL) 50 mg tablet, Take 1 tablet (50 mg total) by mouth daily at bedtime, Disp: 90 tablet, Rfl: 0  •  zolpidem (AMBIEN) 5 mg tablet, Take 1 tablet (5 mg total) by mouth daily at bedtime as needed for sleep, Disp: 30 tablet, Rfl: 0    Health Maintenance     Health Maintenance   Topic Date Due   • Hepatitis C Screening  Never done   • COVID-19 Vaccine (1) Never done   • Pneumococcal Vaccine: Pediatrics (0 to 5 Years) and At-Risk Patients (6 to 59 Years) (1 - PCV) Never done   • HIV Screening  Never done   • Cervical Cancer Screening  Never done   • Influenza Vaccine (1) 09/01/2023   • Depression Remission PHQ  01/13/2024   • Annual Physical  04/05/2024   • Breast Cancer Screening: Mammogram  04/12/2024   • BMI: Followup Plan  11/01/2024   • BMI: Adult  11/13/2024   • DTaP,Tdap,and Td Vaccines (2 - Td or Tdap) 12/25/2024   • HIB Vaccine  Aged Out   • IPV Vaccine  Aged Out   • Hepatitis A Vaccine  Aged Out   • Meningococcal ACWY Vaccine  Aged Out   • HPV Vaccine  Aged Out     Immunization History   Administered Date(s) Administered   • INFLUENZA 04/29/2020, 12/07/2021, 02/06/2022   • Tdap 12/25/2014       Max Wilson Dakin, D.O.   [facility] [place] [address], [address] [facility] [address] [place] [address]  Office: [phone]  Fax: [phone]

## 2023-12-20 ENCOUNTER — OFFICE VISIT (OUTPATIENT)
Dept: INTERNAL MEDICINE CLINIC | Facility: CLINIC | Age: 43
End: 2023-12-20
Payer: MEDICARE

## 2023-12-20 VITALS
TEMPERATURE: 94.7 F | SYSTOLIC BLOOD PRESSURE: 144 MMHG | HEIGHT: 63 IN | RESPIRATION RATE: 18 BRPM | HEART RATE: 88 BPM | DIASTOLIC BLOOD PRESSURE: 80 MMHG | OXYGEN SATURATION: 93 % | BODY MASS INDEX: 51.91 KG/M2 | WEIGHT: 293 LBS

## 2023-12-20 DIAGNOSIS — G43.711 INTRACTABLE CHRONIC MIGRAINE WITHOUT AURA AND WITH STATUS MIGRAINOSUS: Primary | ICD-10-CM

## 2023-12-20 DIAGNOSIS — R60.9 PERIPHERAL EDEMA: ICD-10-CM

## 2023-12-20 DIAGNOSIS — G43.719 INTRACTABLE CHRONIC MIGRAINE WITHOUT AURA AND WITHOUT STATUS MIGRAINOSUS: ICD-10-CM

## 2023-12-20 DIAGNOSIS — I10 ESSENTIAL HYPERTENSION: ICD-10-CM

## 2023-12-20 PROCEDURE — 99214 OFFICE O/P EST MOD 30 MIN: CPT | Performed by: INTERNAL MEDICINE

## 2023-12-20 RX ORDER — FUROSEMIDE 20 MG/1
20 TABLET ORAL DAILY PRN
Qty: 30 TABLET | Refills: 0 | Status: SHIPPED | OUTPATIENT
Start: 2023-12-20

## 2023-12-20 RX ORDER — OLMESARTAN MEDOXOMIL 20 MG/1
20 TABLET ORAL DAILY
Qty: 90 TABLET | Refills: 0 | Status: SHIPPED | OUTPATIENT
Start: 2023-12-20

## 2023-12-20 NOTE — PROGRESS NOTES
"INTERNAL MEDICINE OFFICE VISIT  [facility]- [place]    NAME: Aisha Edwards  AGE: 43 y.o. SEX: female    DATE OF ENCOUNTER: 12/20/2023    Assessment and Plan/History of Present Illness     Here today for follow-up of headache    Medical history of hypertension, anxiety/depression/PTSD, insomnia, migraine, prediabetes      Patient last seen on 11/13/2023 for worsening migraine.  She was given high-dose prednisone taper and prescription for Compazine and Imitrex.  MRI of the brain was requested    Patient states that her migraines have been \"crazy\".  She still gets frequent headaches.  She may go for several days without a headache but headache will then recur and may last a day or two.  Headaches are described as severe, she will feel like her head is been hit with a hammer and will have to lay down and sometimes the headache will ease up.  Headaches tend to be worse at night    She does feel that the Compazine helped with nausea.  She is not sure if the Imitrex has provided much relief    Reviewed brain MRI today which did not show any findings of concern    BP of 144/80 today.  Patient wonders if her headaches may be related to elevated blood pressure.  She did have notably elevated BP in the ER on 11/11 though she did present with severe migraine    Plan:  -We will increase olmesartan dose to 20 mg daily.  I advised her to monitor her BP at home and we will review at follow-up appointment in a few weeks.  She might benefit from addition of low-dose of second antihypertensive if BP is still not at goal  -Continue Imitrex as needed for abortive treatment for now. Compazine prn  -She had previously reported mood side effects with topiramate but feels that things are much better now that she is with her current .  She would be open to trying topiramate again and we could consider this as a preventive  -Could also consider CGRP antagonist       1. Intractable chronic migraine without aura " "and with status migrainosus  Assessment & Plan:  See above    Previous medications: Amitriptyline (weight gain), Topiramate, propranolol  Maxalt (inadequate relief), Imitrex       2. Essential hypertension  -     olmesartan (BENICAR) 20 mg tablet; Take 1 tablet (20 mg total) by mouth daily    3. Peripheral edema  -     furosemide (LASIX) 20 mg tablet; Take 1 tablet (20 mg total) by mouth daily as needed (For leg swelling)               No orders of the defined types were placed in this encounter.      Chief Complaint     Chief Complaint   Patient presents with   • Follow-up     3 WEEKS       Review of Systems     10 point ROS negative except per HPI    The following portions of the patient's history were reviewed and updated as appropriate: allergies, current medications, past family history, past medical history, past social history, past surgical history and problem list.    Active Problem List     Patient Active Problem List   Diagnosis   • Acute sinusitis   • Anxiety and depression   • Primary insomnia   • Prediabetes   • Elevated alkaline phosphatase level   • Class 3 obesity (HCC)   • Migraine   • PTSD (post-traumatic stress disorder)   • Essential hypertension   • Acute anxiety   • Chronic toe pain, right foot   • Swelling of right wrist   • Current episode of major depressive disorder without prior episode       Objective     /80 (BP Location: Left arm, Patient Position: Sitting, Cuff Size: Standard)   Pulse 88   Temp (!) 94.7 °F (34.8 °C)   Resp 18   Ht 5' 3\" (1.6 m)   Wt (!) 149 kg (328 lb)   LMP  (LMP Unknown) Comment: irregular periods  SpO2 93%   BMI 58.10 kg/m²     Physical Exam    Pertinent Laboratory/Diagnostic Studies:  No results found.    Images and diagnostics reviewed     Current Medications     Current Outpatient Medications:   •  albuterol (PROVENTIL HFA,VENTOLIN HFA) 90 mcg/act inhaler, Inhale 2 puffs every 4 (four) hours as needed for wheezing, Disp: 18 g, Rfl: 1  •  furosemide " (LASIX) 20 mg tablet, Take 1 tablet (20 mg total) by mouth daily as needed (For leg swelling), Disp: 30 tablet, Rfl: 0  •  hydrOXYzine HCL (ATARAX) 25 mg tablet, Take 1 tablet (25 mg total) by mouth every 6 (six) hours as needed for itching, Disp: 60 tablet, Rfl: 0  •  LORazepam (Ativan) 0.5 mg tablet, Take 1 tablet (0.5 mg total) by mouth daily as needed for anxiety Take 1 tablet 30 to 60 minutes prior to lab draw, Disp: 2 tablet, Rfl: 0  •  Multiple Vitamin (MULTIVITAMIN) tablet, Take 1 tablet by mouth daily, Disp: , Rfl:   •  nystatin (MYCOSTATIN) powder, Apply topically 3 (three) times a day, Disp: 15 g, Rfl: 0  •  olmesartan (BENICAR) 20 mg tablet, Take 1 tablet (20 mg total) by mouth daily, Disp: 90 tablet, Rfl: 0  •  Phentermine-Topiramate 7.5-46 MG CP24, Take 1 capsule by mouth daily Do not start before November 15, 2023., Disp: 90 capsule, Rfl: 0  •  SUMAtriptan (Imitrex) 100 mg tablet, Take 1 tablet (100 mg total) by mouth if needed for migraine Take at the onset of migraine; if symptoms continue or return, may take another dose at least 2 hours after first dose. Take no more than 2 doses in a day., Disp: 9 tablet, Rfl: 0  •  traZODone (DESYREL) 50 mg tablet, Take 1 tablet (50 mg total) by mouth daily at bedtime, Disp: 90 tablet, Rfl: 0  •  Phentermine-Topiramate 3.75-23 MG CP24, Take 1 capsule by mouth daily for 14 days, Disp: 14 capsule, Rfl: 0  •  prochlorperazine (COMPAZINE) 10 mg tablet, Take 1 tablet (10 mg total) by mouth every 8 (eight) hours as needed for nausea or vomiting (Migraine), Disp: 30 tablet, Rfl: 0  •  zolpidem (AMBIEN) 5 mg tablet, Take 1 tablet (5 mg total) by mouth daily at bedtime as needed for sleep, Disp: 30 tablet, Rfl: 0    Health Maintenance     Health Maintenance   Topic Date Due   • Hepatitis C Screening  Never done   • COVID-19 Vaccine (1) Never done   • Pneumococcal Vaccine: Pediatrics (0 to 5 Years) and At-Risk Patients (6 to 64 Years) (1 - PCV) Never done   • HIV  Screening  Never done   • Cervical Cancer Screening  Never done   • Influenza Vaccine (1) 09/01/2023   • Depression Remission PHQ  01/13/2024   • Annual Physical  04/05/2024   • Breast Cancer Screening: Mammogram  04/12/2024   • BMI: Followup Plan  11/01/2024   • BMI: Adult  11/13/2024   • DTaP,Tdap,and Td Vaccines (2 - Td or Tdap) 12/25/2024   • HIB Vaccine  Aged Out   • IPV Vaccine  Aged Out   • Hepatitis A Vaccine  Aged Out   • Meningococcal ACWY Vaccine  Aged Out   • HPV Vaccine  Aged Out     Immunization History   Administered Date(s) Administered   • INFLUENZA 04/29/2020, 12/07/2021, 02/06/2022   • Tdap 12/25/2014       Garrick Rodríguez D.O.  [facility] Internal [facility] - [address]  [place] [address]  Office: [phone]  Fax: [phone]

## 2023-12-20 NOTE — ASSESSMENT & PLAN NOTE
See above    Previous medications: Amitriptyline (weight gain), Topiramate, propranolol  Maxalt (inadequate relief), Imitrex

## 2023-12-21 RX ORDER — PROCHLORPERAZINE MALEATE 10 MG
10 TABLET ORAL EVERY 8 HOURS PRN
Qty: 30 TABLET | Refills: 0 | Status: SHIPPED | OUTPATIENT
Start: 2023-12-21

## 2024-01-22 DIAGNOSIS — R60.9 PERIPHERAL EDEMA: ICD-10-CM

## 2024-01-22 RX ORDER — FUROSEMIDE 20 MG/1
20 TABLET ORAL DAILY PRN
Qty: 30 TABLET | Refills: 5 | Status: SHIPPED | OUTPATIENT
Start: 2024-01-22

## 2024-01-24 DIAGNOSIS — M54.50 ACUTE BILATERAL LOW BACK PAIN WITHOUT SCIATICA: Primary | ICD-10-CM

## 2024-01-24 RX ORDER — METHYLPREDNISOLONE 4 MG/1
TABLET ORAL
Qty: 21 EACH | Refills: 0 | Status: SHIPPED | OUTPATIENT
Start: 2024-01-24

## 2024-02-08 ENCOUNTER — TELEPHONE (OUTPATIENT)
Dept: INTERNAL MEDICINE CLINIC | Facility: CLINIC | Age: 44
End: 2024-02-08

## 2024-02-08 NOTE — TELEPHONE ENCOUNTER
Patient called in stating that she didn't have access to her phone for 2 days and doesn't know how the appointment got scheduled, cancelled appointment .

## 2024-02-21 PROBLEM — J01.90 ACUTE SINUSITIS: Status: RESOLVED | Noted: 2019-11-09 | Resolved: 2024-02-21

## 2024-02-23 ENCOUNTER — OFFICE VISIT (OUTPATIENT)
Dept: INTERNAL MEDICINE CLINIC | Facility: CLINIC | Age: 44
End: 2024-02-23
Payer: MEDICARE

## 2024-02-23 VITALS
HEART RATE: 98 BPM | DIASTOLIC BLOOD PRESSURE: 100 MMHG | RESPIRATION RATE: 18 BRPM | BODY MASS INDEX: 51.91 KG/M2 | SYSTOLIC BLOOD PRESSURE: 150 MMHG | TEMPERATURE: 97.6 F | HEIGHT: 63 IN | OXYGEN SATURATION: 93 % | WEIGHT: 293 LBS

## 2024-02-23 DIAGNOSIS — N64.4 BREAST PAIN, LEFT: ICD-10-CM

## 2024-02-23 DIAGNOSIS — J45.21 MILD INTERMITTENT ASTHMA WITH EXACERBATION: ICD-10-CM

## 2024-02-23 DIAGNOSIS — J45.909 ASTHMA, UNSPECIFIED ASTHMA SEVERITY, UNSPECIFIED WHETHER COMPLICATED, UNSPECIFIED WHETHER PERSISTENT: Primary | ICD-10-CM

## 2024-02-23 DIAGNOSIS — I10 ESSENTIAL HYPERTENSION: ICD-10-CM

## 2024-02-23 PROCEDURE — 99214 OFFICE O/P EST MOD 30 MIN: CPT | Performed by: INTERNAL MEDICINE

## 2024-02-23 RX ORDER — PREDNISONE 20 MG/1
40 TABLET ORAL DAILY
Qty: 10 TABLET | Refills: 0 | Status: SHIPPED | OUTPATIENT
Start: 2024-02-23 | End: 2024-02-28

## 2024-02-23 RX ORDER — OLMESARTAN MEDOXOMIL 40 MG/1
40 TABLET ORAL DAILY
Qty: 90 TABLET | Refills: 0 | Status: SHIPPED | OUTPATIENT
Start: 2024-02-23

## 2024-02-23 RX ORDER — ALBUTEROL SULFATE 2.5 MG/3ML
2.5 SOLUTION RESPIRATORY (INHALATION) EVERY 6 HOURS PRN
Qty: 180 ML | Refills: 1 | Status: SHIPPED | OUTPATIENT
Start: 2024-02-23

## 2024-02-23 NOTE — ASSESSMENT & PLAN NOTE
Elevated BP readings at home.  Systolic readings in the 140s to 150s/70s to 100s diastolic.  Could be in part related to asthma exacerbation  -We will increase Benicar to 40 mg daily.  Follow-up in 2 weeks, suspect she may need second agent

## 2024-02-23 NOTE — PROGRESS NOTES
INTERNAL MEDICINE OFFICE VISIT  [facility]- [place]    NAME: Aisha Edwards  AGE: 43 y.o. SEX: female    DATE OF ENCOUNTER: 2/23/2024    Assessment and Plan/History of Present Illness     Here today to discuss several concerns   Medical history of hypertension, anxiety/depression/PTSD, insomnia, migraine, prediabetes        1. Asthma, unspecified asthma severity, unspecified whether complicated, unspecified whether persistent  -     albuterol (2.5 mg/3 mL) 0.083 % nebulizer solution; Take 3 mL (2.5 mg total) by nebulization every 6 (six) hours as needed for wheezing or shortness of breath    2. Mild intermittent asthma with exacerbation  Assessment & Plan:  Patient reports her asthma has been flaring up for the past week.  Increase in wheezing.  She has been using her rescue inhaler at home, has nebulizer supplies but ran out of solution.  States her asthma is generally well-controlled and she has not been on maintenance inhalers in the past.  Mild expiratory wheezing on exam today.  She is not in any acute distress  -Sent refill for nebulizer solution to pharmacy, treat with 5-day course of prednisone  -Follow-up in 2 weeks, if control does not improve, consider starting maintenance inhaler    Orders:  -     predniSONE 20 mg tablet; Take 2 tablets (40 mg total) by mouth daily for 5 days    3. Essential hypertension  Assessment & Plan:  Elevated BP readings at home.  Systolic readings in the 140s to 150s/70s to 100s diastolic.  Could be in part related to asthma exacerbation  -We will increase Benicar to 40 mg daily.  Follow-up in 2 weeks, suspect she may need second agent    Orders:  -     olmesartan (BENICAR) 40 mg tablet; Take 1 tablet (40 mg total) by mouth daily    4. Breast pain, left  Assessment & Plan:  Patient reports onset of left sided breast discomfort since our last appointment.  Does not report any discrete lumps.  Does not report any discharge.  She had screening mammogram last  "April which was unremarkable  -Will evaluate further with diagnostic mammogram and breast ultrasound    Orders:  -     Mammo diagnostic bilateral w cad; Future  -     US breast left limited (diagnostic); Future; Expected date: 02/23/2024               Orders Placed This Encounter   Procedures   • Mammo diagnostic bilateral w cad   • US breast left limited (diagnostic)       Chief Complaint     Chief Complaint   Patient presents with   • Follow-up     BP check       Review of Systems     10 point ROS negative except per HPI    The following portions of the patient's history were reviewed and updated as appropriate: allergies, current medications, past family history, past medical history, past social history, past surgical history and problem list.    Active Problem List     Patient Active Problem List   Diagnosis   • Anxiety and depression   • Primary insomnia   • Prediabetes   • Elevated alkaline phosphatase level   • Class 3 obesity (HCC)   • Migraine   • PTSD (post-traumatic stress disorder)   • Essential hypertension   • Acute anxiety   • Chronic toe pain, right foot   • Swelling of right wrist   • Current episode of major depressive disorder without prior episode   • Breast pain, left   • Mild intermittent asthma with exacerbation       Objective     /100 (BP Location: Left arm, Patient Position: Sitting, Cuff Size: Standard)   Pulse 98   Temp 97.6 °F (36.4 °C)   Resp 18   Ht 5' 3\" (1.6 m)   Wt (!) 149 kg (329 lb)   LMP  (LMP Unknown) Comment: irregular periods  SpO2 93%   BMI 58.28 kg/m²     Physical Exam  Cardiovascular:      Rate and Rhythm: Normal rate and regular rhythm.      Heart sounds: Normal heart sounds. No murmur heard.  Pulmonary:      Effort: Pulmonary effort is normal.      Breath sounds: Wheezing present. No rales.         Pertinent Laboratory/Diagnostic Studies:  No results found.    Images and diagnostics reviewed     Current Medications     Current Outpatient Medications:   •  " albuterol (2.5 mg/3 mL) 0.083 % nebulizer solution, Take 3 mL (2.5 mg total) by nebulization every 6 (six) hours as needed for wheezing or shortness of breath, Disp: 180 mL, Rfl: 1  •  albuterol (PROVENTIL HFA,VENTOLIN HFA) 90 mcg/act inhaler, Inhale 2 puffs every 4 (four) hours as needed for wheezing, Disp: 18 g, Rfl: 1  •  furosemide (LASIX) 20 mg tablet, Take 1 tablet (20 mg total) by mouth daily as needed (For leg swelling), Disp: 30 tablet, Rfl: 5  •  hydrOXYzine HCL (ATARAX) 25 mg tablet, Take 1 tablet (25 mg total) by mouth every 6 (six) hours as needed for itching, Disp: 60 tablet, Rfl: 0  •  LORazepam (Ativan) 0.5 mg tablet, Take 1 tablet (0.5 mg total) by mouth daily as needed for anxiety Take 1 tablet 30 to 60 minutes prior to lab draw, Disp: 2 tablet, Rfl: 0  •  Multiple Vitamin (MULTIVITAMIN) tablet, Take 1 tablet by mouth daily, Disp: , Rfl:   •  nystatin (MYCOSTATIN) powder, Apply topically 3 (three) times a day, Disp: 15 g, Rfl: 0  •  olmesartan (BENICAR) 40 mg tablet, Take 1 tablet (40 mg total) by mouth daily, Disp: 90 tablet, Rfl: 0  •  predniSONE 20 mg tablet, Take 2 tablets (40 mg total) by mouth daily for 5 days, Disp: 10 tablet, Rfl: 0  •  prochlorperazine (COMPAZINE) 10 mg tablet, Take 1 tablet (10 mg total) by mouth every 8 (eight) hours as needed for nausea or vomiting (Migraine), Disp: 30 tablet, Rfl: 0  •  SUMAtriptan (Imitrex) 100 mg tablet, Take 1 tablet (100 mg total) by mouth if needed for migraine Take at the onset of migraine; if symptoms continue or return, may take another dose at least 2 hours after first dose. Take no more than 2 doses in a day., Disp: 9 tablet, Rfl: 0  •  traZODone (DESYREL) 50 mg tablet, Take 1 tablet (50 mg total) by mouth daily at bedtime, Disp: 90 tablet, Rfl: 0  •  zolpidem (AMBIEN) 5 mg tablet, Take 1 tablet (5 mg total) by mouth daily at bedtime as needed for sleep, Disp: 30 tablet, Rfl: 0  •  Phentermine-Topiramate 3.75-23 MG CP24, Take 1 capsule by  mouth daily for 14 days (Patient not taking: Reported on 2/23/2024), Disp: 14 capsule, Rfl: 0  •  Phentermine-Topiramate 7.5-46 MG CP24, Take 1 capsule by mouth daily Do not start before November 15, 2023. (Patient not taking: Reported on 2/23/2024), Disp: 90 capsule, Rfl: 0    Health Maintenance     Health Maintenance   Topic Date Due   • Hepatitis C Screening  Never done   • Pneumococcal Vaccine: Pediatrics (0 to 5 Years) and At-Risk Patients (6 to 64 Years) (1 of 2 - PCV) Never done   • HIV Screening  Never done   • Cervical Cancer Screening  Never done   • COVID-19 Vaccine (1 - 2023-24 season) Never done   • Annual Physical  04/05/2024   • Breast Cancer Screening: Mammogram  04/12/2024   • Depression Screening  07/13/2024   • Influenza Vaccine (1) 06/30/2024 (Originally 9/1/2023)   • DTaP,Tdap,and Td Vaccines (2 - Td or Tdap) 12/25/2024   • Zoster Vaccine (1 of 2) 03/15/2029   • HIB Vaccine  Aged Out   • IPV Vaccine  Aged Out   • Hepatitis A Vaccine  Aged Out   • Meningococcal ACWY Vaccine  Aged Out   • HPV Vaccine  Aged Out     Immunization History   Administered Date(s) Administered   • INFLUENZA 04/29/2020, 12/07/2021, 02/06/2022   • Tdap 12/25/2014       Garrick Rodríguez D.O.  [facility] - [address] [place] [address]  Office: [phone]  Fax: [phone]

## 2024-02-23 NOTE — ASSESSMENT & PLAN NOTE
Patient reports onset of left sided breast discomfort since our last appointment.  Does not report any discrete lumps.  Does not report any discharge.  She had screening mammogram last April which was unremarkable  -Will evaluate further with diagnostic mammogram and breast ultrasound

## 2024-02-23 NOTE — ASSESSMENT & PLAN NOTE
Patient reports her asthma has been flaring up for the past week.  Increase in wheezing.  She has been using her rescue inhaler at home, has nebulizer supplies but ran out of solution.  States her asthma is generally well-controlled and she has not been on maintenance inhalers in the past.  Mild expiratory wheezing on exam today.  She is not in any acute distress  -Sent refill for nebulizer solution to pharmacy, treat with 5-day course of prednisone  -Follow-up in 2 weeks, if control does not improve, consider starting maintenance inhaler

## 2024-02-28 ENCOUNTER — PATIENT MESSAGE (OUTPATIENT)
Dept: INTERNAL MEDICINE CLINIC | Facility: CLINIC | Age: 44
End: 2024-02-28

## 2024-03-05 NOTE — PATIENT COMMUNICATION
Pt called back requesting assistance in scheduling US diagnostic. # way called central scheduling which sent me to [facility]. [facility] informed that they do not schedule those but that someone else will call to schedule. They did however, resend pt information to scheduling. Informed pt that they will contact her hopefully within 72 hours. Due to high call volume they are a little behind but will contact her asap.

## 2024-03-08 ENCOUNTER — OFFICE VISIT (OUTPATIENT)
Dept: INTERNAL MEDICINE CLINIC | Facility: CLINIC | Age: 44
End: 2024-03-08
Payer: MEDICARE

## 2024-03-08 ENCOUNTER — TELEPHONE (OUTPATIENT)
Age: 44
End: 2024-03-08

## 2024-03-08 VITALS
TEMPERATURE: 98.1 F | WEIGHT: 293 LBS | OXYGEN SATURATION: 94 % | HEART RATE: 76 BPM | SYSTOLIC BLOOD PRESSURE: 152 MMHG | HEIGHT: 63 IN | DIASTOLIC BLOOD PRESSURE: 104 MMHG | BODY MASS INDEX: 51.91 KG/M2

## 2024-03-08 DIAGNOSIS — I10 ESSENTIAL HYPERTENSION: Primary | ICD-10-CM

## 2024-03-08 DIAGNOSIS — N93.9 ABNORMAL UTERINE BLEEDING (AUB): ICD-10-CM

## 2024-03-08 PROCEDURE — 99214 OFFICE O/P EST MOD 30 MIN: CPT | Performed by: INTERNAL MEDICINE

## 2024-03-08 RX ORDER — LISINOPRIL 40 MG/1
40 TABLET ORAL DAILY
Qty: 90 TABLET | Refills: 0 | Status: SHIPPED | OUTPATIENT
Start: 2024-03-08

## 2024-03-08 NOTE — TELEPHONE ENCOUNTER
Patient's pharmacy called to verify that patient's olmesartan was discontinued. They were informed it was and will move forward with filling the lisinopril.

## 2024-03-08 NOTE — PROGRESS NOTES
INTERNAL MEDICINE OFFICE VISIT  [facility] Internal Medicine- [place]    NAME: Aisha Edwards  AGE: 43 y.o. SEX: female    DATE OF ENCOUNTER: 3/8/2024    Assessment and Plan/History of Present Illness     Here today for follow up of BP / uterine bleeding  Medical history of hypertension, anxiety/depression/PTSD, insomnia, migraine, prediabetes    At prior appointment, Benicar dose was increased from 20 mg to 40 mg.  Patient states that she had noticed mild vaginal bleeding when her Benicar dose was increased from 10 to 20 mg and this has become more prominent with the dose increase to 40 mg.  She denies any vaginal pain.  No dysuria or blood in the stool that she has noticed.  She states that she has not had her period since birth of her son approximately 18 years ago    Has been monitoring BP at home, readings around the 150s over 90s.  Suboptimally controlled      Plan:  -Unclear how Benicar could be related to vaginal bleeding but this appears to  have correlated with dose increases in Benicar.  Patient has had prior traumatic experiences and does not wish to have gynecologic evaluation at this time  -We will try to evaluate noninvasively with pelvic ultrasound, no transvaginal component, could also consider MRI if unrevealing  -Stop Benicar, switch to lisinopril 40 mg  -Follow-up in the office in 3 weeks for further BP medication titration        1. Essential hypertension  Assessment & Plan:  Elevated BP readings at home.  Systolic readings in the 140s to 150s/70s to 100s diastolic.  Could be in part related to asthma exacerbation  -We will increase Benicar to 40 mg daily.  Follow-up in 2 weeks, suspect she may need second agent    Orders:  -     lisinopril (ZESTRIL) 40 mg tablet; Take 1 tablet (40 mg total) by mouth daily    2. Abnormal uterine bleeding (AUB)  -     US pelvis complete non OB; Future; Expected date: 03/08/2024               Orders Placed This Encounter   Procedures   • US pelvis complete non  "OB       Chief Complaint     Chief Complaint   Patient presents with   • Follow-up     F/u on BP meds       Review of Systems     10 point ROS negative except per HPI    The following portions of the patient's history were reviewed and updated as appropriate: allergies, current medications, past family history, past medical history, past social history, past surgical history and problem list.    Active Problem List     Patient Active Problem List   Diagnosis   • Anxiety and depression   • Primary insomnia   • Prediabetes   • Elevated alkaline phosphatase level   • Class 3 obesity (HCC)   • Migraine   • PTSD (post-traumatic stress disorder)   • Essential hypertension   • Acute anxiety   • Chronic toe pain, right foot   • Swelling of right wrist   • Current episode of major depressive disorder without prior episode   • Breast pain, left   • Mild intermittent asthma with exacerbation       Objective     BP (!) 152/104 (BP Location: Left arm, Patient Position: Sitting, Cuff Size: Large)   Pulse 76   Temp 98.1 °F (36.7 °C) (Temporal)   Ht 5' 3\" (1.6 m)   Wt (!) 149 kg (328 lb 12.8 oz)   LMP  (LMP Unknown) Comment: irregular periods  SpO2 94%   BMI 58.24 kg/m²     Physical Exam    Pertinent Laboratory/Diagnostic Studies:  No results found.    Images and diagnostics reviewed     Current Medications     Current Outpatient Medications:   •  albuterol (2.5 mg/3 mL) 0.083 % nebulizer solution, Take 3 mL (2.5 mg total) by nebulization every 6 (six) hours as needed for wheezing or shortness of breath, Disp: 180 mL, Rfl: 1  •  albuterol (PROVENTIL HFA,VENTOLIN HFA) 90 mcg/act inhaler, Inhale 2 puffs every 4 (four) hours as needed for wheezing, Disp: 18 g, Rfl: 1  •  furosemide (LASIX) 20 mg tablet, Take 1 tablet (20 mg total) by mouth daily as needed (For leg swelling), Disp: 30 tablet, Rfl: 5  •  hydrOXYzine HCL (ATARAX) 25 mg tablet, Take 1 tablet (25 mg total) by mouth every 6 (six) hours as needed for itching, Disp: 60 " tablet, Rfl: 0  •  lisinopril (ZESTRIL) 40 mg tablet, Take 1 tablet (40 mg total) by mouth daily, Disp: 90 tablet, Rfl: 0  •  LORazepam (Ativan) 0.5 mg tablet, Take 1 tablet (0.5 mg total) by mouth daily as needed for anxiety Take 1 tablet 30 to 60 minutes prior to lab draw, Disp: 2 tablet, Rfl: 0  •  Multiple Vitamin (MULTIVITAMIN) tablet, Take 1 tablet by mouth daily, Disp: , Rfl:   •  nystatin (MYCOSTATIN) powder, Apply topically 3 (three) times a day, Disp: 15 g, Rfl: 0  •  prochlorperazine (COMPAZINE) 10 mg tablet, Take 1 tablet (10 mg total) by mouth every 8 (eight) hours as needed for nausea or vomiting (Migraine), Disp: 30 tablet, Rfl: 0  •  SUMAtriptan (Imitrex) 100 mg tablet, Take 1 tablet (100 mg total) by mouth if needed for migraine Take at the onset of migraine; if symptoms continue or return, may take another dose at least 2 hours after first dose. Take no more than 2 doses in a day., Disp: 9 tablet, Rfl: 0  •  traZODone (DESYREL) 50 mg tablet, Take 1 tablet (50 mg total) by mouth daily at bedtime, Disp: 90 tablet, Rfl: 0  •  zolpidem (AMBIEN) 5 mg tablet, Take 1 tablet (5 mg total) by mouth daily at bedtime as needed for sleep, Disp: 30 tablet, Rfl: 0    Health Maintenance     Health Maintenance   Topic Date Due   • Hepatitis C Screening  Never done   • Pneumococcal Vaccine: Pediatrics (0 to 5 Years) and At-Risk Patients (6 to 64 Years) (1 of 2 - PCV) Never done   • HIV Screening  Never done   • Cervical Cancer Screening  Never done   • COVID-19 Vaccine (1 - 2023-24 season) Never done   • Annual Physical  04/05/2024   • Breast Cancer Screening: Mammogram  04/12/2024   • Depression Screening  07/13/2024   • Influenza Vaccine (1) 06/30/2024 (Originally 9/1/2023)   • DTaP,Tdap,and Td Vaccines (2 - Td or Tdap) 12/25/2024   • Zoster Vaccine (1 of 2) 03/15/2029   • HIB Vaccine  Aged Out   • IPV Vaccine  Aged Out   • Hepatitis A Vaccine  Aged Out   • Meningococcal ACWY Vaccine  Aged Out   • HPV Vaccine   Aged Out     Immunization History   Administered Date(s) Administered   • INFLUENZA 04/29/2020, 12/07/2021, 02/06/2022   • Tdap 12/25/2014       Garrick Rodríguez D.O.  [facility] Internal Medicine [address]  [place] [address]  Office: [phone]  Fax: [phone]

## 2024-03-12 ENCOUNTER — OFFICE VISIT (OUTPATIENT)
Dept: INTERNAL MEDICINE CLINIC | Facility: CLINIC | Age: 44
End: 2024-03-12
Payer: MEDICARE

## 2024-03-12 VITALS
TEMPERATURE: 96 F | OXYGEN SATURATION: 93 % | HEART RATE: 84 BPM | HEIGHT: 63 IN | WEIGHT: 293 LBS | DIASTOLIC BLOOD PRESSURE: 100 MMHG | BODY MASS INDEX: 51.91 KG/M2 | SYSTOLIC BLOOD PRESSURE: 152 MMHG | RESPIRATION RATE: 18 BRPM

## 2024-03-12 DIAGNOSIS — H60.502 ACUTE OTITIS EXTERNA OF LEFT EAR, UNSPECIFIED TYPE: Primary | ICD-10-CM

## 2024-03-12 PROCEDURE — 99214 OFFICE O/P EST MOD 30 MIN: CPT | Performed by: INTERNAL MEDICINE

## 2024-03-12 NOTE — PROGRESS NOTES
"INTERNAL MEDICINE OFFICE VISIT  [facility] Internal Medicine- [place]    NAME: Aisha Edwards  AGE: 43 y.o. SEX: female    DATE OF ENCOUNTER: 3/12/2024    Assessment and Plan/History of Present Illness     Here today for evaluation of ear pain  Medical history of hypertension, anxiety/depression/PTSD, insomnia, migraine, prediabetes     Here today for evaluation of left ear pain.  Started on Saturday.  Pain has been progressively worsening.  Right ear unaffected.  No fevers, chills, coughing.  She reports discharge that looks dark and \"yucky\".  On exam, she has significant tenderness to palpation when pressing on the tragus.  No clear evidence of tympanic membrane effusion or bulging on exam, mild cerumen accumulation seen.  She had some leftover Ciprodex drops at home and has been applying them for the last 2 days without much improvement    Plan:  -Suspect acute otitis externa  -Treat with course of Corticosporin drops  -If her symptoms do not improve, advised her to let me know      1. Acute otitis externa of left ear, unspecified type  -     neomycin-polymyxin-hydrocortisone (CORTISPORIN) otic solution; Administer 4 drops into the left ear every 6 (six) hours for 7 days                 No orders of the defined types were placed in this encounter.      Chief Complaint     Chief Complaint   Patient presents with   • Earache      Times Saturday to present / otc tylenol/ re check BP// pt refused Pneumo       Review of Systems     10 point ROS negative except per HPI    The following portions of the patient's history were reviewed and updated as appropriate: allergies, current medications, past family history, past medical history, past social history, past surgical history and problem list.    Active Problem List     Patient Active Problem List   Diagnosis   • Anxiety and depression   • Primary insomnia   • Prediabetes   • Elevated alkaline phosphatase level   • Class 3 obesity (HCC)   • Migraine   • PTSD " "(post-traumatic stress disorder)   • Essential hypertension   • Acute anxiety   • Chronic toe pain, right foot   • Swelling of right wrist   • Current episode of major depressive disorder without prior episode   • Breast pain, left   • Mild intermittent asthma with exacerbation       Objective     /100 (BP Location: Left arm, Patient Position: Sitting, Cuff Size: Standard)   Pulse 84   Temp (!) 96 °F (35.6 °C)   Resp 18   Ht 5' 3\" (1.6 m)   Wt (!) 150 kg (330 lb)   LMP  (LMP Unknown) Comment: irregular periods  SpO2 93%   BMI 58.46 kg/m²     Physical Exam  HENT:      Right Ear: Tympanic membrane and ear canal normal.      Left Ear: External ear normal. There is no impacted cerumen.         Pertinent Laboratory/Diagnostic Studies:  No results found.    Images and diagnostics reviewed     Current Medications     Current Outpatient Medications:   •  albuterol (2.5 mg/3 mL) 0.083 % nebulizer solution, Take 3 mL (2.5 mg total) by nebulization every 6 (six) hours as needed for wheezing or shortness of breath, Disp: 180 mL, Rfl: 1  •  albuterol (PROVENTIL HFA,VENTOLIN HFA) 90 mcg/act inhaler, Inhale 2 puffs every 4 (four) hours as needed for wheezing, Disp: 18 g, Rfl: 1  •  furosemide (LASIX) 20 mg tablet, Take 1 tablet (20 mg total) by mouth daily as needed (For leg swelling), Disp: 30 tablet, Rfl: 5  •  hydrOXYzine HCL (ATARAX) 25 mg tablet, Take 1 tablet (25 mg total) by mouth every 6 (six) hours as needed for itching, Disp: 60 tablet, Rfl: 0  •  lisinopril (ZESTRIL) 40 mg tablet, Take 1 tablet (40 mg total) by mouth daily, Disp: 90 tablet, Rfl: 0  •  LORazepam (Ativan) 0.5 mg tablet, Take 1 tablet (0.5 mg total) by mouth daily as needed for anxiety Take 1 tablet 30 to 60 minutes prior to lab draw, Disp: 2 tablet, Rfl: 0  •  Multiple Vitamin (MULTIVITAMIN) tablet, Take 1 tablet by mouth daily, Disp: , Rfl:   •  neomycin-polymyxin-hydrocortisone (CORTISPORIN) otic solution, Administer 4 drops into the left " ear every 6 (six) hours for 7 days, Disp: 10 mL, Rfl: 0  •  nystatin (MYCOSTATIN) powder, Apply topically 3 (three) times a day, Disp: 15 g, Rfl: 0  •  prochlorperazine (COMPAZINE) 10 mg tablet, Take 1 tablet (10 mg total) by mouth every 8 (eight) hours as needed for nausea or vomiting (Migraine), Disp: 30 tablet, Rfl: 0  •  SUMAtriptan (Imitrex) 100 mg tablet, Take 1 tablet (100 mg total) by mouth if needed for migraine Take at the onset of migraine; if symptoms continue or return, may take another dose at least 2 hours after first dose. Take no more than 2 doses in a day., Disp: 9 tablet, Rfl: 0  •  traZODone (DESYREL) 50 mg tablet, Take 1 tablet (50 mg total) by mouth daily at bedtime, Disp: 90 tablet, Rfl: 0  •  zolpidem (AMBIEN) 5 mg tablet, Take 1 tablet (5 mg total) by mouth daily at bedtime as needed for sleep, Disp: 30 tablet, Rfl: 0    Health Maintenance     Health Maintenance   Topic Date Due   • Hepatitis C Screening  Never done   • Pneumococcal Vaccine: Pediatrics (0 to 5 Years) and At-Risk Patients (6 to 64 Years) (1 of 2 - PCV) Never done   • HIV Screening  Never done   • Cervical Cancer Screening  Never done   • COVID-19 Vaccine (1 - 2023-24 season) Never done   • Annual Physical  04/05/2024   • Breast Cancer Screening: Mammogram  04/12/2024   • Depression Screening  07/13/2024   • Influenza Vaccine (1) 06/30/2024 (Originally 9/1/2023)   • DTaP,Tdap,and Td Vaccines (2 - Td or Tdap) 12/25/2024   • Zoster Vaccine (1 of 2) 03/15/2029   • HIB Vaccine  Aged Out   • IPV Vaccine  Aged Out   • Hepatitis A Vaccine  Aged Out   • Meningococcal ACWY Vaccine  Aged Out   • HPV Vaccine  Aged Out     Immunization History   Administered Date(s) Administered   • INFLUENZA 04/29/2020, 12/07/2021, 02/06/2022   • Tdap 12/25/2014       Garrick Rodríguez D.O.  [facility] Internal Medicine - [address]  [place] [address]  Office: [phone]  Fax: [phone]

## 2024-03-13 ENCOUNTER — HOSPITAL ENCOUNTER (OUTPATIENT)
Dept: RADIOLOGY | Facility: HOSPITAL | Age: 44
Discharge: HOME | End: 2024-03-13
Payer: MEDICARE

## 2024-03-13 ENCOUNTER — PATIENT MESSAGE (OUTPATIENT)
Dept: INTERNAL MEDICINE CLINIC | Facility: CLINIC | Age: 44
End: 2024-03-13

## 2024-03-13 DIAGNOSIS — E55.9 VITAMIN D DEFICIENCY: ICD-10-CM

## 2024-03-13 DIAGNOSIS — R73.03 PREDIABETES: ICD-10-CM

## 2024-03-13 DIAGNOSIS — E61.1 IRON DEFICIENCY: Primary | ICD-10-CM

## 2024-03-13 DIAGNOSIS — E66.01 CLASS 3 OBESITY (HCC): ICD-10-CM

## 2024-03-13 DIAGNOSIS — N93.9 ABNORMAL UTERINE BLEEDING (AUB): ICD-10-CM

## 2024-03-13 DIAGNOSIS — R74.8 ELEVATED ALKALINE PHOSPHATASE LEVEL: ICD-10-CM

## 2024-03-13 PROCEDURE — 76856 US EXAM PELVIC COMPLETE: CPT

## 2024-03-15 DIAGNOSIS — F41.9 ACUTE ANXIETY: ICD-10-CM

## 2024-03-20 RX ORDER — LORAZEPAM 0.5 MG/1
0.5 TABLET ORAL DAILY PRN
Qty: 2 TABLET | Refills: 0 | Status: SHIPPED | OUTPATIENT
Start: 2024-03-20

## 2024-03-27 ENCOUNTER — TELEPHONE (OUTPATIENT)
Dept: BARIATRICS | Facility: CLINIC | Age: 44
End: 2024-03-27

## 2024-04-09 ENCOUNTER — OFFICE VISIT (OUTPATIENT)
Dept: INTERNAL MEDICINE CLINIC | Facility: CLINIC | Age: 44
End: 2024-04-09
Payer: MEDICARE

## 2024-04-09 VITALS
WEIGHT: 293 LBS | TEMPERATURE: 97.7 F | OXYGEN SATURATION: 91 % | SYSTOLIC BLOOD PRESSURE: 148 MMHG | HEART RATE: 91 BPM | DIASTOLIC BLOOD PRESSURE: 88 MMHG | HEIGHT: 63 IN | BODY MASS INDEX: 51.91 KG/M2 | RESPIRATION RATE: 20 BRPM

## 2024-04-09 DIAGNOSIS — I10 ESSENTIAL HYPERTENSION: ICD-10-CM

## 2024-04-09 DIAGNOSIS — K04.7 TOOTH INFECTION: Primary | ICD-10-CM

## 2024-04-09 PROCEDURE — 99214 OFFICE O/P EST MOD 30 MIN: CPT | Performed by: INTERNAL MEDICINE

## 2024-04-09 RX ORDER — CLINDAMYCIN HYDROCHLORIDE 300 MG/1
300 CAPSULE ORAL 3 TIMES DAILY
Qty: 21 CAPSULE | Refills: 0 | Status: SHIPPED | OUTPATIENT
Start: 2024-04-09 | End: 2024-04-16

## 2024-04-09 NOTE — PROGRESS NOTES
INTERNAL MEDICINE OFFICE VISIT  [facility] Internal Medicine- [place]    NAME: Aisha Edwards  AGE: 44 y.o. SEX: female    DATE OF ENCOUNTER: 4/9/2024    Assessment and Plan/History of Present Illness     Here today for evaluation of left-sided facial swelling/tooth pain  Medical history of hypertension, anxiety/depression/PTSD, insomnia, migraine, prediabetes      Patient reports recent onset of left-sided facial swelling with tooth pain.  She has 2 broken teeth left lower jaw, states she made an appointment with dentist but cannot be seen for another month.  On exam, she has left-sided facial swelling with mild erythema.  Tenderness to palpation of one of the broken premolars.  No obvious purulence.  She is systemically well-appearing.  There does not appear to be any evidence of airway compromise.  No dysphagia or odynophagia reported    She does have history of hospital admission back in December 2015 for similar complaint, found to have CT scan which showed likely dental related swelling of the left masseter muscle.  She received IV clindamycin  Which patient states she tolerated without issue    Plan:  -Concern for odontogenic infection/possible facial cellulitis/abscess.  We will treat with course of clindamycin, penicillin allergy is noted  -If her symptoms do not respond promptly, would recommend STAT CT to evaluate for facial/dental abscess and in that case would facilitate evaluation with OMS or referral to ER.  Aisha voiced her understanding  -May continue with Tylenol as needed, warm compresses to the jaw      1. Tooth infection  -     clindamycin (CLEOCIN) 300 MG capsule; Take 1 capsule (300 mg total) by mouth 3 (three) times a day for 7 days    2. Essential hypertension  Assessment & Plan:  Previously switched from Benicar to lisinopril as Benicar seemed to correlate with vaginal bleeding.  Her vaginal bleeding has resolved with change to lisinopril.  We obtained pelvic ultrasound which did not  "appear to show any significant pathology  -Continue lisinopril for now, we will follow-up in the office in 1 month for BP check once her mouth infection has resolved as outlined elsewhere                 No orders of the defined types were placed in this encounter.      Chief Complaint     Chief Complaint   Patient presents with   • Follow-up     BP med check//pt refused pneumo       Review of Systems     10 point ROS negative except per HPI    The following portions of the patient's history were reviewed and updated as appropriate: allergies, current medications, past family history, past medical history, past social history, past surgical history and problem list.    Active Problem List     Patient Active Problem List   Diagnosis   • Anxiety and depression   • Primary insomnia   • Prediabetes   • Elevated alkaline phosphatase level   • Class 3 obesity (HCC)   • Migraine   • PTSD (post-traumatic stress disorder)   • Essential hypertension   • Acute anxiety   • Chronic toe pain, right foot   • Swelling of right wrist   • Current episode of major depressive disorder without prior episode   • Breast pain, left   • Mild intermittent asthma with exacerbation       Objective     /88 (BP Location: Right arm, Patient Position: Sitting, Cuff Size: Standard)   Pulse 91   Temp 97.7 °F (36.5 °C)   Resp 20   Ht 5' 3\" (1.6 m)   Wt (!) 147 kg (323 lb)   LMP  (LMP Unknown) Comment: irregular periods  SpO2 91%   BMI 57.22 kg/m²     Physical Exam    Pertinent Laboratory/Diagnostic Studies:  US pelvis transabdominal only    Result Date: 3/20/2024  Impression: Unremarkable transabdominal pelvic ultrasound Workstation performed: [id number]       Images and diagnostics reviewed     Current Medications     Current Outpatient Medications:   •  albuterol (2.5 mg/3 mL) 0.083 % nebulizer solution, Take 3 mL (2.5 mg total) by nebulization every 6 (six) hours as needed for wheezing or shortness of breath, Disp: 180 mL, Rfl: 1  •  " albuterol (PROVENTIL HFA,VENTOLIN HFA) 90 mcg/act inhaler, Inhale 2 puffs every 4 (four) hours as needed for wheezing, Disp: 18 g, Rfl: 1  •  clindamycin (CLEOCIN) 300 MG capsule, Take 1 capsule (300 mg total) by mouth 3 (three) times a day for 7 days, Disp: 21 capsule, Rfl: 0  •  furosemide (LASIX) 20 mg tablet, Take 1 tablet (20 mg total) by mouth daily as needed (For leg swelling), Disp: 30 tablet, Rfl: 5  •  hydrOXYzine HCL (ATARAX) 25 mg tablet, Take 1 tablet (25 mg total) by mouth every 6 (six) hours as needed for itching, Disp: 60 tablet, Rfl: 0  •  lisinopril (ZESTRIL) 40 mg tablet, Take 1 tablet (40 mg total) by mouth daily, Disp: 90 tablet, Rfl: 0  •  LORazepam (Ativan) 0.5 mg tablet, Take 1 tablet (0.5 mg total) by mouth daily as needed for anxiety Take 1 tablet 30 to 60 minutes prior to lab draw, Disp: 2 tablet, Rfl: 0  •  nystatin (MYCOSTATIN) powder, Apply topically 3 (three) times a day, Disp: 15 g, Rfl: 0  •  prochlorperazine (COMPAZINE) 10 mg tablet, Take 1 tablet (10 mg total) by mouth every 8 (eight) hours as needed for nausea or vomiting (Migraine), Disp: 30 tablet, Rfl: 0  •  SUMAtriptan (Imitrex) 100 mg tablet, Take 1 tablet (100 mg total) by mouth if needed for migraine Take at the onset of migraine; if symptoms continue or return, may take another dose at least 2 hours after first dose. Take no more than 2 doses in a day., Disp: 9 tablet, Rfl: 0  •  traZODone (DESYREL) 50 mg tablet, Take 1 tablet (50 mg total) by mouth daily at bedtime, Disp: 90 tablet, Rfl: 0  •  zolpidem (AMBIEN) 5 mg tablet, Take 1 tablet (5 mg total) by mouth daily at bedtime as needed for sleep, Disp: 30 tablet, Rfl: 0  •  Multiple Vitamin (MULTIVITAMIN) tablet, Take 1 tablet by mouth daily, Disp: , Rfl:   •  neomycin-polymyxin-hydrocortisone (CORTISPORIN) otic solution, Administer 4 drops into the left ear every 6 (six) hours for 7 days, Disp: 10 mL, Rfl: 0    Health Maintenance     Health Maintenance   Topic Date Due    • Hepatitis C Screening  Never done   • Pneumococcal Vaccine: Pediatrics (0 to 5 Years) and At-Risk Patients (6 to 64 Years) (1 of 2 - PCV) Never done   • HIV Screening  Never done   • Cervical Cancer Screening  Never done   • COVID-19 Vaccine (1 - 2023-24 season) Never done   • Annual Physical  04/05/2024   • Colorectal Cancer Screening  03/15/2024   • Breast Cancer Screening: Mammogram  04/12/2024   • Depression Screening  07/13/2024   • Influenza Vaccine (1) 06/30/2024 (Originally 9/1/2023)   • DTaP,Tdap,and Td Vaccines (2 - Td or Tdap) 12/25/2024   • Zoster Vaccine (1 of 2) 03/15/2029   • HIB Vaccine  Aged Out   • IPV Vaccine  Aged Out   • Hepatitis A Vaccine  Aged Out   • Meningococcal ACWY Vaccine  Aged Out   • HPV Vaccine  Aged Out     Immunization History   Administered Date(s) Administered   • INFLUENZA 04/29/2020, 12/07/2021, 02/06/2022   • Tdap 12/25/2014       Garrick Rodríguez D.O.  [facility] Internal Medicine - [address] [place] [address]  Office: [phone]  Fax: [phone]

## 2024-04-09 NOTE — ASSESSMENT & PLAN NOTE
Previously switched from Benicar to lisinopril as Benicar seemed to correlate with vaginal bleeding.  Her vaginal bleeding has resolved with change to lisinopril.  We obtained pelvic ultrasound which did not appear to show any significant pathology  -Continue lisinopril for now, we will follow-up in the office in 1 month for BP check once her mouth infection has resolved as outlined elsewhere

## 2024-05-01 ENCOUNTER — OFFICE VISIT (OUTPATIENT)
Dept: INTERNAL MEDICINE CLINIC | Facility: CLINIC | Age: 44
End: 2024-05-01
Payer: MEDICARE

## 2024-05-01 ENCOUNTER — APPOINTMENT (OUTPATIENT)
Dept: LAB | Facility: HOSPITAL | Age: 44
End: 2024-05-01
Payer: MEDICARE

## 2024-05-01 VITALS
WEIGHT: 293 LBS | BODY MASS INDEX: 51.91 KG/M2 | RESPIRATION RATE: 18 BRPM | HEIGHT: 63 IN | TEMPERATURE: 97.7 F | OXYGEN SATURATION: 92 % | HEART RATE: 96 BPM | DIASTOLIC BLOOD PRESSURE: 82 MMHG | SYSTOLIC BLOOD PRESSURE: 140 MMHG

## 2024-05-01 DIAGNOSIS — E55.9 VITAMIN D DEFICIENCY: ICD-10-CM

## 2024-05-01 DIAGNOSIS — R22.0 FACIAL SWELLING: ICD-10-CM

## 2024-05-01 DIAGNOSIS — R22.0 FACIAL SWELLING: Primary | ICD-10-CM

## 2024-05-01 DIAGNOSIS — R73.03 PREDIABETES: ICD-10-CM

## 2024-05-01 DIAGNOSIS — R74.8 ELEVATED ALKALINE PHOSPHATASE LEVEL: ICD-10-CM

## 2024-05-01 DIAGNOSIS — F41.9 ACUTE ANXIETY: ICD-10-CM

## 2024-05-01 DIAGNOSIS — E66.01 CLASS 3 OBESITY (HCC): ICD-10-CM

## 2024-05-01 DIAGNOSIS — E61.1 IRON DEFICIENCY: ICD-10-CM

## 2024-05-01 LAB
BASOPHILS # BLD AUTO: 0.02 THOUSANDS/ÂΜL (ref 0–0.1)
BASOPHILS NFR BLD AUTO: 0 % (ref 0–1)
CHOLEST SERPL-MCNC: 186 MG/DL
EOSINOPHIL # BLD AUTO: 0.09 THOUSAND/ÂΜL (ref 0–0.61)
EOSINOPHIL NFR BLD AUTO: 1 % (ref 0–6)
ERYTHROCYTE [DISTWIDTH] IN BLOOD BY AUTOMATED COUNT: 13.8 % (ref 11.6–15.1)
HCT VFR BLD AUTO: 44.4 % (ref 34.8–46.1)
HDLC SERPL-MCNC: 45 MG/DL
HGB BLD-MCNC: 14.1 G/DL (ref 11.5–15.4)
IMM GRANULOCYTES # BLD AUTO: 0.05 THOUSAND/UL (ref 0–0.2)
IMM GRANULOCYTES NFR BLD AUTO: 1 % (ref 0–2)
LDLC SERPL CALC-MCNC: 118 MG/DL (ref 0–100)
LYMPHOCYTES # BLD AUTO: 1.98 THOUSANDS/ÂΜL (ref 0.6–4.47)
LYMPHOCYTES NFR BLD AUTO: 20 % (ref 14–44)
MCH RBC QN AUTO: 27.9 PG (ref 26.8–34.3)
MCHC RBC AUTO-ENTMCNC: 31.8 G/DL (ref 31.4–37.4)
MCV RBC AUTO: 88 FL (ref 82–98)
MONOCYTES # BLD AUTO: 0.63 THOUSAND/ÂΜL (ref 0.17–1.22)
MONOCYTES NFR BLD AUTO: 6 % (ref 4–12)
NEUTROPHILS # BLD AUTO: 7.2 THOUSANDS/ÂΜL (ref 1.85–7.62)
NEUTS SEG NFR BLD AUTO: 72 % (ref 43–75)
NRBC BLD AUTO-RTO: 0 /100 WBCS
PLATELET # BLD AUTO: 273 THOUSANDS/UL (ref 149–390)
PMV BLD AUTO: 9.8 FL (ref 8.9–12.7)
RBC # BLD AUTO: 5.05 MILLION/UL (ref 3.81–5.12)
TRIGL SERPL-MCNC: 114 MG/DL
TSH SERPL DL<=0.05 MIU/L-ACNC: 1.91 UIU/ML (ref 0.45–4.5)
WBC # BLD AUTO: 9.97 THOUSAND/UL (ref 4.31–10.16)

## 2024-05-01 PROCEDURE — 80053 COMPREHEN METABOLIC PANEL: CPT

## 2024-05-01 PROCEDURE — 85025 COMPLETE CBC W/AUTO DIFF WBC: CPT

## 2024-05-01 PROCEDURE — 82728 ASSAY OF FERRITIN: CPT

## 2024-05-01 PROCEDURE — 82306 VITAMIN D 25 HYDROXY: CPT

## 2024-05-01 PROCEDURE — 84075 ASSAY ALKALINE PHOSPHATASE: CPT

## 2024-05-01 PROCEDURE — 80061 LIPID PANEL: CPT

## 2024-05-01 PROCEDURE — 83550 IRON BINDING TEST: CPT

## 2024-05-01 PROCEDURE — 36415 COLL VENOUS BLD VENIPUNCTURE: CPT

## 2024-05-01 PROCEDURE — 83036 HEMOGLOBIN GLYCOSYLATED A1C: CPT

## 2024-05-01 PROCEDURE — 82977 ASSAY OF GGT: CPT

## 2024-05-01 PROCEDURE — 84080 ASSAY ALKALINE PHOSPHATASES: CPT

## 2024-05-01 PROCEDURE — 99214 OFFICE O/P EST MOD 30 MIN: CPT | Performed by: INTERNAL MEDICINE

## 2024-05-01 PROCEDURE — 84443 ASSAY THYROID STIM HORMONE: CPT

## 2024-05-01 PROCEDURE — 83540 ASSAY OF IRON: CPT

## 2024-05-01 RX ORDER — LORAZEPAM 0.5 MG/1
0.5 TABLET ORAL DAILY PRN
Qty: 2 TABLET | Refills: 0 | Status: SHIPPED | OUTPATIENT
Start: 2024-05-01

## 2024-05-01 NOTE — PROGRESS NOTES
"INTERNAL MEDICINE OFFICE VISIT  [facility] Internal Medicine- [place]    NAME: Aisha Edwards  AGE: 44 y.o. SEX: female    DATE OF ENCOUNTER: 5/1/2024    Assessment and Plan/History of Present Illness     Here today for reevaluation of suspected odontogenic infection  Medical history of hypertension, anxiety/depression/PTSD, insomnia, migraine, prediabetes     Per my last office visit note on 4/9/2024:  \"Patient reports recent onset of left-sided facial swelling with tooth pain.  She has 2 broken teeth left lower jaw, states she made an appointment with dentist but cannot be seen for another month.  On exam, she has left-sided facial swelling with mild erythema.  Tenderness to palpation of one of the broken premolars.  No obvious purulence.  She is systemically well-appearing.  There does not appear to be any evidence of airway compromise.  No dysphagia or odynophagia reported     She does have history of hospital admission back in December 2015 for similar complaint, found to have CT scan which showed likely dental related swelling of the left masseter muscle.  She received IV clindamycin  Which patient states she tolerated without issue     Plan:  -Concern for odontogenic infection/possible facial cellulitis/abscess.  We will treat with course of clindamycin, penicillin allergy is noted  -If her symptoms do not respond promptly, would recommend STAT CT to evaluate for facial/dental abscess and in that case would facilitate evaluation with [facility] or referral to ER.  Aisha voiced her understanding  -May continue with Tylenol as needed, warm compresses to the jaw\"      5/1/24:  -Patient comes in today for reevaluation.  Patient completed course of antibiotics but has had recurrence of swelling.  She contacted dental provider and was advised to follow-up with PCP due to the worsening swelling.  On exam today she has left lower facial swelling, tender to palpation.  She has 2 broken teeth in the left lower jaw.  No " intraoral purulence noted.  She remains afebrile, systemically well-appearing  -Will refer for stat CT to evaluate for facial/dental abscess.  May likely need I&D with [facility] for definitive treatment, will be in touch with patient with results.  Hold off on any additional antibiotic therapy at this time until CT is completed  -Check BMP prior to CT.  Patient requires sedative prior to blood work and CT scan.  Sent for 2 tablets of Ativan to pharmacy which has been helpful for her in the past        1. Facial swelling  -     CT soft tissue neck w contrast; Future; Expected date: 05/01/2024  -     Basic metabolic panel; Future    2. Acute anxiety  -     LORazepam (Ativan) 0.5 mg tablet; Take 1 tablet (0.5 mg total) by mouth daily as needed for anxiety Take 1 tablet 30 to 60 minutes prior to lab draw               Orders Placed This Encounter   Procedures   • CT soft tissue neck w contrast   • Basic metabolic panel       Chief Complaint     Chief Complaint   Patient presents with   • Follow-up       Review of Systems     10 point ROS negative except per HPI    The following portions of the patient's history were reviewed and updated as appropriate: allergies, current medications, past family history, past medical history, past social history, past surgical history and problem list.    Active Problem List     Patient Active Problem List   Diagnosis   • Anxiety and depression   • Primary insomnia   • Prediabetes   • Elevated alkaline phosphatase level   • Class 3 obesity (HCC)   • Migraine   • PTSD (post-traumatic stress disorder)   • Essential hypertension   • Acute anxiety   • Chronic toe pain, right foot   • Swelling of right wrist   • Current episode of major depressive disorder without prior episode   • Breast pain, left   • Mild intermittent asthma with exacerbation       Objective     /82 (BP Location: Left arm, Patient Position: Sitting, Cuff Size: Standard)   Pulse 96   Temp 97.7 °F (36.5 °C)   Resp 18    "Ht 5' 3\" (1.6 m)   Wt (!) 146 kg (321 lb)   LMP  (LMP Unknown) Comment: irregular periods  SpO2 92%   BMI 56.86 kg/m²     Physical Exam  HENT:      Mouth/Throat:      Mouth: Mucous membranes are moist.      Pharynx: No oropharyngeal exudate or posterior oropharyngeal erythema.      Comments: Left-sided facial swelling, tender to palpation        Pertinent Laboratory/Diagnostic Studies:  US pelvis transabdominal only    Result Date: 3/20/2024  Impression: Unremarkable transabdominal pelvic ultrasound Workstation performed: [id number]       Images and diagnostics reviewed     Current Medications     Current Outpatient Medications:   •  albuterol (2.5 mg/3 mL) 0.083 % nebulizer solution, Take 3 mL (2.5 mg total) by nebulization every 6 (six) hours as needed for wheezing or shortness of breath, Disp: 180 mL, Rfl: 1  •  albuterol (PROVENTIL HFA,VENTOLIN HFA) 90 mcg/act inhaler, Inhale 2 puffs every 4 (four) hours as needed for wheezing, Disp: 18 g, Rfl: 1  •  furosemide (LASIX) 20 mg tablet, Take 1 tablet (20 mg total) by mouth daily as needed (For leg swelling), Disp: 30 tablet, Rfl: 5  •  lisinopril (ZESTRIL) 40 mg tablet, Take 1 tablet (40 mg total) by mouth daily, Disp: 90 tablet, Rfl: 0  •  LORazepam (Ativan) 0.5 mg tablet, Take 1 tablet (0.5 mg total) by mouth daily as needed for anxiety Take 1 tablet 30 to 60 minutes prior to lab draw, Disp: 2 tablet, Rfl: 0  •  Multiple Vitamin (MULTIVITAMIN) tablet, Take 1 tablet by mouth daily, Disp: , Rfl:   •  nystatin (MYCOSTATIN) powder, Apply topically 3 (three) times a day, Disp: 15 g, Rfl: 0  •  prochlorperazine (COMPAZINE) 10 mg tablet, Take 1 tablet (10 mg total) by mouth every 8 (eight) hours as needed for nausea or vomiting (Migraine), Disp: 30 tablet, Rfl: 0  •  SUMAtriptan (Imitrex) 100 mg tablet, Take 1 tablet (100 mg total) by mouth if needed for migraine Take at the onset of migraine; if symptoms continue or return, may take another dose at least 2 hours " after first dose. Take no more than 2 doses in a day., Disp: 9 tablet, Rfl: 0  •  traZODone (DESYREL) 50 mg tablet, Take 1 tablet (50 mg total) by mouth daily at bedtime, Disp: 90 tablet, Rfl: 0  •  zolpidem (AMBIEN) 5 mg tablet, Take 1 tablet (5 mg total) by mouth daily at bedtime as needed for sleep, Disp: 30 tablet, Rfl: 0  •  hydrOXYzine HCL (ATARAX) 25 mg tablet, Take 1 tablet (25 mg total) by mouth every 6 (six) hours as needed for itching, Disp: 60 tablet, Rfl: 0  •  neomycin-polymyxin-hydrocortisone (CORTISPORIN) otic solution, Administer 4 drops into the left ear every 6 (six) hours for 7 days, Disp: 10 mL, Rfl: 0    Health Maintenance     Health Maintenance   Topic Date Due   • Hepatitis C Screening  Never done   • Pneumococcal Vaccine: Pediatrics (0 to 5 Years) and At-Risk Patients (6 to 64 Years) (1 of 2 - PCV) Never done   • HIV Screening  Never done   • Cervical Cancer Screening  Never done   • COVID-19 Vaccine (1 - 2023-24 season) Never done   • Colorectal Cancer Screening  Never done   • Annual Physical  04/05/2024   • Breast Cancer Screening: Mammogram  04/12/2024   • Depression Screening  07/13/2024   • Influenza Vaccine (Season Ended) 09/01/2024   • DTaP,Tdap,and Td Vaccines (2 - Td or Tdap) 12/25/2024   • Zoster Vaccine (1 of 2) 03/15/2029   • HIB Vaccine  Aged Out   • IPV Vaccine  Aged Out   • Hepatitis A Vaccine  Aged Out   • Meningococcal ACWY Vaccine  Aged Out   • HPV Vaccine  Aged Out     Immunization History   Administered Date(s) Administered   • INFLUENZA 04/29/2020, 12/07/2021, 02/06/2022   • Tdap 12/25/2014       Garrick Rodríguez D.O.  [facility] Internal Medicine [address] [place] [id number]  Office: [phone]  Fax: [phone]

## 2024-05-02 ENCOUNTER — TELEPHONE (OUTPATIENT)
Dept: INTERNAL MEDICINE CLINIC | Facility: CLINIC | Age: 44
End: 2024-05-02

## 2024-05-02 ENCOUNTER — HOSPITAL ENCOUNTER (OUTPATIENT)
Dept: RADIOLOGY | Facility: HOSPITAL | Age: 44
Discharge: HOME | End: 2024-05-02
Attending: INTERNAL MEDICINE
Payer: MEDICARE

## 2024-05-02 DIAGNOSIS — K02.9 DENTAL CARIES: Primary | ICD-10-CM

## 2024-05-02 DIAGNOSIS — L03.211 FACIAL CELLULITIS: ICD-10-CM

## 2024-05-02 DIAGNOSIS — R22.0 FACIAL SWELLING: ICD-10-CM

## 2024-05-02 LAB
25(OH)D3 SERPL-MCNC: 9.5 NG/ML (ref 30–100)
EST. AVERAGE GLUCOSE BLD GHB EST-MCNC: 148 MG/DL
FERRITIN SERPL-MCNC: 231 NG/ML (ref 11–307)
GGT SERPL-CCNC: 55 U/L (ref 9–64)
HBA1C MFR BLD: 6.8 %
IRON SATN MFR SERPL: 9 % (ref 15–50)
IRON SERPL-MCNC: 28 UG/DL (ref 50–212)
TIBC SERPL-MCNC: 296 UG/DL (ref 250–450)
UIBC SERPL-MCNC: 268 UG/DL (ref 155–355)

## 2024-05-02 PROCEDURE — 70491 CT SOFT TISSUE NECK W/DYE: CPT

## 2024-05-02 RX ORDER — CLINDAMYCIN HYDROCHLORIDE 300 MG/1
300 CAPSULE ORAL 3 TIMES DAILY
Qty: 21 CAPSULE | Refills: 0 | Status: SHIPPED | OUTPATIENT
Start: 2024-05-02 | End: 2024-05-09

## 2024-05-02 RX ADMIN — IOHEXOL 85 ML: 350 INJECTION, SOLUTION INTRAVENOUS at 07:45

## 2024-05-02 NOTE — TELEPHONE ENCOUNTER
----- Message from Garrick Rodríguez DO sent at 5/2/2024 12:28 PM EDT -----  Pete Leonard.  Your CT scan does not show any definite abscess, which is good news.  There is some soft tissue swelling in the left cheek area concerning for possible cellulitis/infection.  Dental caries (tooth decay) also noted and of course follow-up with dental provider as recommended    I am going to send for another course of antibiotic to the pharmacy, clindamycin.  Given no significant abscess, no need for any drainage or hospital admission at this time.  You can continue to follow-up with your dental provider, but I will also place a referral to . [facility] (oral and maxillofacial surgeons) to talk about addressing the tooth issues and you can follow with them if you would prefer

## 2024-05-05 DIAGNOSIS — E55.9 VITAMIN D DEFICIENCY: Primary | ICD-10-CM

## 2024-05-05 RX ORDER — ERGOCALCIFEROL 1.25 MG/1
50000 CAPSULE ORAL WEEKLY
Qty: 8 CAPSULE | Refills: 0 | Status: SHIPPED | OUTPATIENT
Start: 2024-05-05

## 2024-05-07 ENCOUNTER — TELEPHONE (OUTPATIENT)
Age: 44
End: 2024-05-07

## 2024-05-08 LAB
ALBUMIN SERPL BCP-MCNC: 4.2 G/DL (ref 3.5–5)
ALP BONE CFR SERPL: 29 % (ref 14–68)
ALP INTEST CFR SERPL: 2 % (ref 0–18)
ALP LIVER CFR SERPL: 69 % (ref 18–85)
ALP SERPL-CCNC: 152 U/L (ref 34–104)
ALP SERPL-CCNC: 177 IU/L (ref 44–121)
ALT SERPL W P-5'-P-CCNC: 22 U/L (ref 7–52)
ANION GAP SERPL CALCULATED.3IONS-SCNC: 7 MMOL/L (ref 4–13)
AST SERPL W P-5'-P-CCNC: 18 U/L (ref 13–39)
BILIRUB SERPL-MCNC: 1.18 MG/DL (ref 0.2–1)
BUN SERPL-MCNC: 8 MG/DL (ref 5–25)
CALCIUM SERPL-MCNC: 9.6 MG/DL (ref 8.4–10.2)
CHLORIDE SERPL-SCNC: 100 MMOL/L (ref 96–108)
CO2 SERPL-SCNC: 27 MMOL/L (ref 21–32)
CREAT SERPL-MCNC: 0.57 MG/DL (ref 0.6–1.3)
GFR SERPL CREATININE-BSD FRML MDRD: 112 ML/MIN/1.73SQ M
GLUCOSE P FAST SERPL-MCNC: 133 MG/DL (ref 65–99)
POTASSIUM SERPL-SCNC: 4.3 MMOL/L (ref 3.5–5.3)
PROT SERPL-MCNC: 7.6 G/DL (ref 6.4–8.4)
SODIUM SERPL-SCNC: 134 MMOL/L (ref 135–147)

## 2024-05-22 ENCOUNTER — OFFICE VISIT (OUTPATIENT)
Dept: INTERNAL MEDICINE CLINIC | Facility: CLINIC | Age: 44
End: 2024-05-22
Payer: MEDICARE

## 2024-05-22 VITALS
HEIGHT: 63 IN | RESPIRATION RATE: 15 BRPM | HEART RATE: 80 BPM | BODY MASS INDEX: 51.91 KG/M2 | TEMPERATURE: 97.9 F | SYSTOLIC BLOOD PRESSURE: 140 MMHG | DIASTOLIC BLOOD PRESSURE: 88 MMHG | WEIGHT: 293 LBS

## 2024-05-22 DIAGNOSIS — F41.9 ACUTE ANXIETY: ICD-10-CM

## 2024-05-22 DIAGNOSIS — R74.8 ELEVATED ALKALINE PHOSPHATASE LEVEL: ICD-10-CM

## 2024-05-22 DIAGNOSIS — E55.9 VITAMIN D DEFICIENCY: ICD-10-CM

## 2024-05-22 DIAGNOSIS — G43.711 INTRACTABLE CHRONIC MIGRAINE WITHOUT AURA AND WITH STATUS MIGRAINOSUS: ICD-10-CM

## 2024-05-22 DIAGNOSIS — R73.09 ELEVATED HEMOGLOBIN A1C: ICD-10-CM

## 2024-05-22 DIAGNOSIS — F41.9 ANXIETY AND DEPRESSION: Primary | ICD-10-CM

## 2024-05-22 DIAGNOSIS — R73.03 PREDIABETES: ICD-10-CM

## 2024-05-22 DIAGNOSIS — F32.A ANXIETY AND DEPRESSION: Primary | ICD-10-CM

## 2024-05-22 PROCEDURE — 99214 OFFICE O/P EST MOD 30 MIN: CPT | Performed by: INTERNAL MEDICINE

## 2024-05-22 RX ORDER — LORAZEPAM 0.5 MG/1
0.5 TABLET ORAL DAILY PRN
Qty: 2 TABLET | Refills: 0 | Status: SHIPPED | OUTPATIENT
Start: 2024-05-22

## 2024-05-22 RX ORDER — AMITRIPTYLINE HYDROCHLORIDE 75 MG/1
75 TABLET ORAL
Qty: 90 TABLET | Refills: 1 | Status: SHIPPED | OUTPATIENT
Start: 2024-05-22

## 2024-05-22 NOTE — ASSESSMENT & PLAN NOTE
Patient previously weaned off of amitriptyline due to concerns for possible weight gain.  Unclear if this truly had a discernible effect on weight gain and patient reports worsening in depressive symptoms since our last appointment.  She does not report any new significant stressors and states life is good at home.  She did feel she was having a benefit from the amitriptyline previously from a mood standpoint  -We are going to restart prior amitriptyline dose 75 mg nightly  -She may also continue with trazodone.  We discussed that both of these medications may have sedating effects and if the combination of the 2 causes oversedation we will adjust dose accordingly.  Trazodone has also been of questionable benefit and would consider weaning off in the future regardless

## 2024-05-22 NOTE — ASSESSMENT & PLAN NOTE
A1c 6.8, fasting glucose 133 on most recent set of labs which meets diagnostic criteria for type 2 diabetes.  Patient overall maintains healthy diet.  We discussed importance of weight loss, continuing with healthy dietary and lifestyle choices.  Will defer any new medications at this time  -Recheck A1c and fasting glucose in 4 months

## 2024-05-22 NOTE — ASSESSMENT & PLAN NOTE
Chronic, mild elevation in alkaline phosphatase.  Transaminases, bilirubin within normal limits  -GGT within normal limits  Right upper quadrant ultrasound completed November 2022 -no pathology seen, no cholelithiasis, enlarged echodense liver compatible with fatty infiltration, no significant ductal dilation seen  -Etiology unclear, will continue to monitor

## 2024-05-22 NOTE — PROGRESS NOTES
INTERNAL MEDICINE OFFICE VISIT  [facility] Internal Medicine- [place]    NAME: Aisha Edwards  AGE: 44 y.o. SEX: female    DATE OF ENCOUNTER: 5/22/2024    Assessment and Plan/History of Present Illness     Here today for follow up  Medical history of hypertension, anxiety/depression/PTSD, insomnia, migraine, prediabetes       1. Anxiety and depression  Assessment & Plan:  Patient previously weaned off of amitriptyline due to concerns for possible weight gain.  Unclear if this truly had a discernible effect on weight gain and patient reports worsening in depressive symptoms since our last appointment.  She does not report any new significant stressors and states life is good at home.  She did feel she was having a benefit from the amitriptyline previously from a mood standpoint  -We are going to restart prior amitriptyline dose 75 mg nightly  -She may also continue with trazodone.  We discussed that both of these medications may have sedating effects and if the combination of the 2 causes oversedation we will adjust dose accordingly.  Trazodone has also been of questionable benefit and would consider weaning off in the future regardless  Orders:  -     amitriptyline (ELAVIL) 75 mg tablet; Take 1 tablet (75 mg total) by mouth daily at bedtime  2. Intractable chronic migraine without aura and with status migrainosus  -     amitriptyline (ELAVIL) 75 mg tablet; Take 1 tablet (75 mg total) by mouth daily at bedtime  3. Elevated hemoglobin A1c  -     Hemoglobin A1C; Future; Expected date: 09/19/2024  -     Comprehensive metabolic panel; Future; Expected date: 09/19/2024  4. Acute anxiety  -     LORazepam (Ativan) 0.5 mg tablet; Take 1 tablet (0.5 mg total) by mouth daily as needed for anxiety Take 1 tablet 30 to 60 minutes prior to lab draw  5. Vitamin D deficiency  Assessment & Plan:  Treat with vitamin D 50,000 units once weekly for 8 weeks, then transition to once daily vitamin D 2000 units  over-the-counter  -Recheck vitamin D level with next set of labs  Orders:  -     Vitamin D 25 hydroxy; Future  6. Prediabetes  Assessment & Plan:  A1c 6.8, fasting glucose 133 on most recent set of labs which meets diagnostic criteria for type 2 diabetes.  Patient overall maintains healthy diet.  We discussed importance of weight loss, continuing with healthy dietary and lifestyle choices.  Will defer any new medications at this time  -Recheck A1c and fasting glucose in 4 months  7. Elevated alkaline phosphatase level  Assessment & Plan:  Chronic, mild elevation in alkaline phosphatase.  Transaminases, bilirubin within normal limits  -GGT within normal limits  Right upper quadrant ultrasound completed November 2022 -no pathology seen, no cholelithiasis, enlarged echodense liver compatible with fatty infiltration, no significant ductal dilation seen  -Etiology unclear, will continue to monitor             Orders Placed This Encounter   Procedures   • Hemoglobin A1C   • Comprehensive metabolic panel   • Vitamin D 25 hydroxy       Chief Complaint     Chief Complaint   Patient presents with   • Follow-up     Needs colonoscopy, pap, mammo HIV & Hep C screening (all ordered).  Refused pap due to Denominational beliefs.  Refused HIV & Hepatitis screening.       Review of Systems     10 point ROS negative except per HPI    The following portions of the patient's history were reviewed and updated as appropriate: allergies, current medications, past family history, past medical history, past social history, past surgical history and problem list.    Active Problem List     Patient Active Problem List   Diagnosis   • Anxiety and depression   • Primary insomnia   • Prediabetes   • Elevated alkaline phosphatase level   • Class 3 obesity (HCC)   • Migraine   • PTSD (post-traumatic stress disorder)   • Essential hypertension   • Acute anxiety   • Chronic toe pain, right foot   • Swelling of right wrist   • Current episode of major  "depressive disorder without prior episode   • Breast pain, left   • Mild intermittent asthma with exacerbation   • Vitamin D deficiency       Objective     /88 (BP Location: Left arm, Patient Position: Sitting, Cuff Size: Large)   Pulse 80   Temp 97.9 °F (36.6 °C)   Resp 15   Ht 5' 3\" (1.6 m)   Wt (!) 147 kg (325 lb)   LMP  (LMP Unknown) Comment: irregular periods  BMI 57.57 kg/m²     Physical Exam    Pertinent Laboratory/Diagnostic Studies:  CT soft tissue neck w contrast    Result Date: 5/2/2024  Impression: 1. Scattered dental caries noted. Dental assessment and follow-up advised. 2. No definite  space abscess. There is mild soft tissue swelling in the left  space worrisome for cellulitis. 3. Mild groundglass opacities in the upper lung fields possibly atelectasis versus mild pulmonary interstitial edema. Workstation performed: [id number]       Images and diagnostics reviewed     Current Medications     Current Outpatient Medications:   •  albuterol (2.5 mg/3 mL) 0.083 % nebulizer solution, Take 3 mL (2.5 mg total) by nebulization every 6 (six) hours as needed for wheezing or shortness of breath, Disp: 180 mL, Rfl: 1  •  albuterol (PROVENTIL HFA,VENTOLIN HFA) 90 mcg/act inhaler, Inhale 2 puffs every 4 (four) hours as needed for wheezing, Disp: 18 g, Rfl: 1  •  amitriptyline (ELAVIL) 75 mg tablet, Take 1 tablet (75 mg total) by mouth daily at bedtime, Disp: 90 tablet, Rfl: 1  •  ergocalciferol (VITAMIN D2) 50,000 units, Take 1 capsule (50,000 Units total) by mouth once a week, Disp: 8 capsule, Rfl: 0  •  furosemide (LASIX) 20 mg tablet, Take 1 tablet (20 mg total) by mouth daily as needed (For leg swelling), Disp: 30 tablet, Rfl: 5  •  hydrOXYzine HCL (ATARAX) 25 mg tablet, Take 1 tablet (25 mg total) by mouth every 6 (six) hours as needed for itching, Disp: 60 tablet, Rfl: 0  •  lisinopril (ZESTRIL) 40 mg tablet, Take 1 tablet (40 mg total) by mouth daily, Disp: 90 tablet, Rfl: " 0  •  LORazepam (Ativan) 0.5 mg tablet, Take 1 tablet (0.5 mg total) by mouth daily as needed for anxiety Take 1 tablet 30 to 60 minutes prior to lab draw, Disp: 2 tablet, Rfl: 0  •  Multiple Vitamin (MULTIVITAMIN) tablet, Take 1 tablet by mouth daily, Disp: , Rfl:   •  nystatin (MYCOSTATIN) powder, Apply topically 3 (three) times a day, Disp: 15 g, Rfl: 0  •  prochlorperazine (COMPAZINE) 10 mg tablet, Take 1 tablet (10 mg total) by mouth every 8 (eight) hours as needed for nausea or vomiting (Migraine), Disp: 30 tablet, Rfl: 0  •  SUMAtriptan (Imitrex) 100 mg tablet, Take 1 tablet (100 mg total) by mouth if needed for migraine Take at the onset of migraine; if symptoms continue or return, may take another dose at least 2 hours after first dose. Take no more than 2 doses in a day., Disp: 9 tablet, Rfl: 0  •  traZODone (DESYREL) 50 mg tablet, Take 1 tablet (50 mg total) by mouth daily at bedtime, Disp: 90 tablet, Rfl: 0  •  zolpidem (AMBIEN) 5 mg tablet, Take 1 tablet (5 mg total) by mouth daily at bedtime as needed for sleep, Disp: 30 tablet, Rfl: 0  •  neomycin-polymyxin-hydrocortisone (CORTISPORIN) otic solution, Administer 4 drops into the left ear every 6 (six) hours for 7 days, Disp: 10 mL, Rfl: 0    Health Maintenance     Health Maintenance   Topic Date Due   • Pneumococcal Vaccine: Pediatrics (0 to 5 Years) and At-Risk Patients (6 to 64 Years) (1 of 2 - PCV) Never done   • COVID-19 Vaccine (1 - 2023-24 season) Never done   • Colorectal Cancer Screening  Never done   • Annual Physical  04/05/2024   • Breast Cancer Screening: Mammogram  04/12/2024   • HIV Screening  06/22/2024 (Originally 3/15/1994)   • Cervical Cancer Screening  06/22/2024 (Originally 3/15/2000)   • Hepatitis C Screening  05/22/2025 (Originally 1980)   • Influenza Vaccine (Season Ended) 09/01/2024   • DTaP,Tdap,and Td Vaccines (2 - Td or Tdap) 12/25/2024   • Depression Screening  05/22/2025   • Zoster Vaccine (1 of 2) 03/15/2029   • RSV  Vaccine Age 60+ Years (1 - 1-dose 60+ series) 03/15/2039   • RSV Vaccine age 0-20 Months  Aged Out   • HIB Vaccine  Aged Out   • IPV Vaccine  Aged Out   • Hepatitis A Vaccine  Aged Out   • Meningococcal ACWY Vaccine  Aged Out   • HPV Vaccine  Aged Out     Immunization History   Administered Date(s) Administered   • INFLUENZA 04/29/2020, 12/07/2021, 02/06/2022   • Tdap 12/25/2014       Garrick Rodríguez D.O.  [facility] [place] [address]  Office: [phone]  Fax: [phone]

## 2024-05-22 NOTE — ASSESSMENT & PLAN NOTE
Treat with vitamin D 50,000 units once weekly for 8 weeks, then transition to once daily vitamin D 2000 units over-the-counter  -Recheck vitamin D level with next set of labs

## 2024-05-23 DIAGNOSIS — G43.719 INTRACTABLE CHRONIC MIGRAINE WITHOUT AURA AND WITHOUT STATUS MIGRAINOSUS: ICD-10-CM

## 2024-05-23 DIAGNOSIS — F51.01 PRIMARY INSOMNIA: ICD-10-CM

## 2024-05-24 RX ORDER — PROCHLORPERAZINE MALEATE 10 MG
10 TABLET ORAL EVERY 8 HOURS PRN
Qty: 90 TABLET | Refills: 1 | Status: SHIPPED | OUTPATIENT
Start: 2024-05-24

## 2024-05-24 RX ORDER — ZOLPIDEM TARTRATE 5 MG/1
5 TABLET ORAL
Qty: 30 TABLET | Refills: 0 | Status: SHIPPED | OUTPATIENT
Start: 2024-05-24

## 2024-06-04 ENCOUNTER — PATIENT MESSAGE (OUTPATIENT)
Dept: INTERNAL MEDICINE CLINIC | Facility: CLINIC | Age: 44
End: 2024-06-04

## 2024-06-04 DIAGNOSIS — N93.9 ABNORMAL UTERINE BLEEDING (AUB): Primary | ICD-10-CM

## 2024-06-18 DIAGNOSIS — L30.4 INTERTRIGO: ICD-10-CM

## 2024-06-18 DIAGNOSIS — J45.909 ASTHMA, UNSPECIFIED ASTHMA SEVERITY, UNSPECIFIED WHETHER COMPLICATED, UNSPECIFIED WHETHER PERSISTENT: ICD-10-CM

## 2024-06-18 RX ORDER — NYSTATIN 100000 [USP'U]/G
POWDER TOPICAL 3 TIMES DAILY
Qty: 15 G | Refills: 0 | Status: SHIPPED | OUTPATIENT
Start: 2024-06-18

## 2024-06-18 RX ORDER — ALBUTEROL SULFATE 2.5 MG/3ML
2.5 SOLUTION RESPIRATORY (INHALATION) EVERY 6 HOURS PRN
Qty: 180 ML | Refills: 1 | Status: SHIPPED | OUTPATIENT
Start: 2024-06-18

## 2024-06-18 RX ORDER — ALBUTEROL SULFATE 90 UG/1
2 AEROSOL, METERED RESPIRATORY (INHALATION) EVERY 4 HOURS PRN
Qty: 18 G | Refills: 1 | Status: SHIPPED | OUTPATIENT
Start: 2024-06-18

## 2024-06-26 ENCOUNTER — OFFICE VISIT (OUTPATIENT)
Dept: INTERNAL MEDICINE CLINIC | Facility: CLINIC | Age: 44
End: 2024-06-26
Payer: MEDICARE

## 2024-06-26 VITALS
RESPIRATION RATE: 18 BRPM | TEMPERATURE: 96.7 F | HEIGHT: 63 IN | OXYGEN SATURATION: 93 % | WEIGHT: 293 LBS | BODY MASS INDEX: 51.91 KG/M2 | HEART RATE: 83 BPM | SYSTOLIC BLOOD PRESSURE: 142 MMHG | DIASTOLIC BLOOD PRESSURE: 68 MMHG

## 2024-06-26 DIAGNOSIS — R31.9 URINARY TRACT INFECTION WITH HEMATURIA, SITE UNSPECIFIED: ICD-10-CM

## 2024-06-26 DIAGNOSIS — N39.0 URINARY TRACT INFECTION WITH HEMATURIA, SITE UNSPECIFIED: ICD-10-CM

## 2024-06-26 DIAGNOSIS — N30.00 ACUTE CYSTITIS WITHOUT HEMATURIA: Primary | ICD-10-CM

## 2024-06-26 DIAGNOSIS — O21.0 MORNING SICKNESS: ICD-10-CM

## 2024-06-26 LAB
SL AMB  POCT GLUCOSE, UA: NEGATIVE
SL AMB LEUKOCYTE ESTERASE,UA: ABNORMAL
SL AMB POCT BILIRUBIN,UA: NEGATIVE
SL AMB POCT BLOOD,UA: ABNORMAL
SL AMB POCT CLARITY,UA: ABNORMAL
SL AMB POCT COLOR,UA: YELLOW
SL AMB POCT KETONES,UA: NEGATIVE
SL AMB POCT NITRITE,UA: NEGATIVE
SL AMB POCT PH,UA: 6.5
SL AMB POCT SPECIFIC GRAVITY,UA: 1.02
SL AMB POCT URINE PROTEIN: NEGATIVE
SL AMB POCT UROBILINOGEN: ABNORMAL

## 2024-06-26 PROCEDURE — 99214 OFFICE O/P EST MOD 30 MIN: CPT | Performed by: INTERNAL MEDICINE

## 2024-06-26 PROCEDURE — 87086 URINE CULTURE/COLONY COUNT: CPT | Performed by: INTERNAL MEDICINE

## 2024-06-26 PROCEDURE — 81002 URINALYSIS NONAUTO W/O SCOPE: CPT | Performed by: INTERNAL MEDICINE

## 2024-06-26 RX ORDER — PHENAZOPYRIDINE HYDROCHLORIDE 95 MG/1
200 TABLET ORAL 3 TIMES DAILY PRN
Qty: 10 TABLET | Refills: 0 | Status: SHIPPED | OUTPATIENT
Start: 2024-06-26

## 2024-06-26 RX ORDER — PHENAZOPYRIDINE HYDROCHLORIDE 95 MG/1
95 TABLET ORAL 3 TIMES DAILY PRN
Qty: 10 TABLET | Refills: 0 | Status: SHIPPED | OUTPATIENT
Start: 2024-06-26 | End: 2024-06-26

## 2024-06-26 RX ORDER — SULFAMETHOXAZOLE AND TRIMETHOPRIM 800; 160 MG/1; MG/1
1 TABLET ORAL EVERY 12 HOURS SCHEDULED
Qty: 6 TABLET | Refills: 0 | Status: SHIPPED | OUTPATIENT
Start: 2024-06-26 | End: 2024-06-29

## 2024-06-26 NOTE — PROGRESS NOTES
INTERNAL MEDICINE OFFICE VISIT  [facility] Internal Medicine- [place]    NAME: Aisha Edawrds  AGE: 44 y.o. SEX: female    DATE OF ENCOUNTER: 6/26/2024    Assessment and Plan/History of Present Illness     Here today for same-day appointment  Medical history of hypertension, anxiety/depression/PTSD, insomnia, migraine, prediabetes      Onset of dysuria 3 to 4 days ago.  No associated hematuria, flank pain, fever/chills.  She has prior history of UTI.  She is systemically well-appearing today    Urine dip completed today positive for leukocyte esterase, negative nitrites.  No protein seen, small amount of blood seen    Patient also reports that she has been having symptoms of nausea in the mornings.  She had similar symptoms with her prior pregnancies.  She states that for her prior pregnancies her urine pregnancy tests were not positive and she inquires about possibility of getting blood test for pregnancy    Plan:  -We will obtain serum hCG to evaluate for pregnancy.  I advised her to refrain from starting antibiotic or Azo prior to hearing from me about her hCG results  -Sent prescription for 3-day course of Bactrim to pharmacy for suspected acute cystitis, can use Azo as needed for symptomatic relief  -Send out urine sample for culture      1. Acute cystitis without hematuria  -     sulfamethoxazole-trimethoprim (BACTRIM DS) 800-160 mg per tablet; Take 1 tablet by mouth every 12 (twelve) hours for 3 days  -     Urine culture; Future  -     phenazopyridine (PYRIDIUM) 95 MG tablet; Take 2 tablets (190 mg total) by mouth 3 (three) times a day as needed for bladder spasms  2. Urinary tract infection with hematuria, site unspecified  -     POCT urine dip  3. Morning sickness  -     hCG, quantitative; Future             Orders Placed This Encounter   Procedures   • Urine culture   • hCG, quantitative   • POCT urine dip       Chief Complaint     Chief Complaint   Patient presents with   • Urinary Tract Infection      "Pt refused pneumo       Review of Systems     10 point ROS negative except per HPI    The following portions of the patient's history were reviewed and updated as appropriate: allergies, current medications, past family history, past medical history, past social history, past surgical history and problem list.    Objective     /68 (BP Location: Left arm, Patient Position: Sitting, Cuff Size: Standard)   Pulse 83   Temp (!) 96.7 °F (35.9 °C)   Resp 18   Ht 5' 3\" (1.6 m)   Wt (!) 145 kg (320 lb)   LMP  (LMP Unknown) Comment: irregular periods  SpO2 93%   BMI 56.69 kg/m²     Physical Exam      Current Medications     Current Outpatient Medications:   •  albuterol (2.5 mg/3 mL) 0.083 % nebulizer solution, Take 3 mL (2.5 mg total) by nebulization every 6 (six) hours as needed for wheezing or shortness of breath, Disp: 180 mL, Rfl: 1  •  albuterol (PROVENTIL HFA,VENTOLIN HFA) 90 mcg/act inhaler, Inhale 2 puffs every 4 (four) hours as needed for wheezing, Disp: 18 g, Rfl: 1  •  amitriptyline (ELAVIL) 75 mg tablet, Take 1 tablet (75 mg total) by mouth daily at bedtime, Disp: 90 tablet, Rfl: 1  •  ergocalciferol (VITAMIN D2) 50,000 units, Take 1 capsule (50,000 Units total) by mouth once a week, Disp: 8 capsule, Rfl: 0  •  furosemide (LASIX) 20 mg tablet, Take 1 tablet (20 mg total) by mouth daily as needed (For leg swelling), Disp: 30 tablet, Rfl: 5  •  hydrOXYzine HCL (ATARAX) 25 mg tablet, Take 1 tablet (25 mg total) by mouth every 6 (six) hours as needed for itching, Disp: 60 tablet, Rfl: 0  •  lisinopril (ZESTRIL) 40 mg tablet, Take 1 tablet (40 mg total) by mouth daily, Disp: 90 tablet, Rfl: 0  •  LORazepam (Ativan) 0.5 mg tablet, Take 1 tablet (0.5 mg total) by mouth daily as needed for anxiety Take 1 tablet 30 to 60 minutes prior to lab draw, Disp: 2 tablet, Rfl: 0  •  Multiple Vitamin (MULTIVITAMIN) tablet, Take 1 tablet by mouth daily, Disp: , Rfl:   •  nystatin (MYCOSTATIN) powder, Apply topically 3 " (three) times a day, Disp: 15 g, Rfl: 0  •  phenazopyridine (PYRIDIUM) 95 MG tablet, Take 2 tablets (190 mg total) by mouth 3 (three) times a day as needed for bladder spasms, Disp: 10 tablet, Rfl: 0  •  prochlorperazine (COMPAZINE) 10 mg tablet, Take 1 tablet (10 mg total) by mouth every 8 (eight) hours as needed for nausea or vomiting (Migraine), Disp: 90 tablet, Rfl: 1  •  sulfamethoxazole-trimethoprim (BACTRIM DS) 800-160 mg per tablet, Take 1 tablet by mouth every 12 (twelve) hours for 3 days, Disp: 6 tablet, Rfl: 0  •  SUMAtriptan (Imitrex) 100 mg tablet, Take 1 tablet (100 mg total) by mouth if needed for migraine Take at the onset of migraine; if symptoms continue or return, may take another dose at least 2 hours after first dose. Take no more than 2 doses in a day., Disp: 9 tablet, Rfl: 0  •  traZODone (DESYREL) 50 mg tablet, Take 1 tablet (50 mg total) by mouth daily at bedtime, Disp: 90 tablet, Rfl: 0  •  zolpidem (AMBIEN) 5 mg tablet, Take 1 tablet (5 mg total) by mouth daily at bedtime as needed for sleep, Disp: 30 tablet, Rfl: 0  •  neomycin-polymyxin-hydrocortisone (CORTISPORIN) otic solution, Administer 4 drops into the left ear every 6 (six) hours for 7 days, Disp: 10 mL, Rfl: 0      Garrick Rodríguez D.O.  [facility] Internal Medicine [address]  [place] [address]  Office: [phone]  Fax: [phone]

## 2024-06-28 ENCOUNTER — APPOINTMENT (OUTPATIENT)
Dept: LAB | Facility: HOSPITAL | Age: 44
End: 2024-06-28
Payer: MEDICARE

## 2024-06-28 DIAGNOSIS — O21.0 MORNING SICKNESS: ICD-10-CM

## 2024-06-28 DIAGNOSIS — F41.9 ACUTE ANXIETY: ICD-10-CM

## 2024-06-28 DIAGNOSIS — E55.9 VITAMIN D DEFICIENCY: ICD-10-CM

## 2024-06-28 LAB
25(OH)D3 SERPL-MCNC: 13.8 NG/ML (ref 30–100)
B-HCG SERPL-ACNC: 0.7 MIU/ML (ref 0–5)
BACTERIA UR CULT: NORMAL

## 2024-06-28 PROCEDURE — 36415 COLL VENOUS BLD VENIPUNCTURE: CPT

## 2024-06-28 PROCEDURE — 82306 VITAMIN D 25 HYDROXY: CPT

## 2024-06-28 PROCEDURE — 84702 CHORIONIC GONADOTROPIN TEST: CPT

## 2024-06-28 RX ORDER — LORAZEPAM 0.5 MG/1
0.5 TABLET ORAL DAILY PRN
Qty: 2 TABLET | Refills: 0 | Status: SHIPPED | OUTPATIENT
Start: 2024-06-28

## 2024-07-01 ENCOUNTER — PATIENT MESSAGE (OUTPATIENT)
Dept: INTERNAL MEDICINE CLINIC | Facility: CLINIC | Age: 44
End: 2024-07-01

## 2024-07-01 DIAGNOSIS — E55.9 VITAMIN D DEFICIENCY: ICD-10-CM

## 2024-07-01 RX ORDER — ERGOCALCIFEROL 1.25 MG/1
50000 CAPSULE ORAL WEEKLY
Qty: 8 CAPSULE | Refills: 0 | Status: SHIPPED | OUTPATIENT
Start: 2024-07-01

## 2024-08-05 ENCOUNTER — OFFICE VISIT (OUTPATIENT)
Dept: GYNECOLOGY | Facility: CLINIC | Age: 44
End: 2024-08-05
Payer: MEDICARE

## 2024-08-05 VITALS
DIASTOLIC BLOOD PRESSURE: 70 MMHG | SYSTOLIC BLOOD PRESSURE: 127 MMHG | HEIGHT: 63 IN | BODY MASS INDEX: 51.91 KG/M2 | WEIGHT: 293 LBS

## 2024-08-05 DIAGNOSIS — N91.4 SECONDARY OLIGOMENORRHEA: Primary | ICD-10-CM

## 2024-08-05 DIAGNOSIS — N93.9 ABNORMAL UTERINE BLEEDING (AUB): ICD-10-CM

## 2024-08-05 PROCEDURE — 99203 OFFICE O/P NEW LOW 30 MIN: CPT | Performed by: OBSTETRICS & GYNECOLOGY

## 2024-08-06 DIAGNOSIS — G43.719 INTRACTABLE CHRONIC MIGRAINE WITHOUT AURA AND WITHOUT STATUS MIGRAINOSUS: ICD-10-CM

## 2024-08-06 DIAGNOSIS — F51.01 PRIMARY INSOMNIA: ICD-10-CM

## 2024-08-06 RX ORDER — PROCHLORPERAZINE MALEATE 10 MG
10 TABLET ORAL EVERY 8 HOURS PRN
Qty: 90 TABLET | Refills: 0 | Status: SHIPPED | OUTPATIENT
Start: 2024-08-06
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
## 2024-08-07 RX ORDER — ZOLPIDEM TARTRATE 5 MG/1
5 TABLET ORAL
Qty: 30 TABLET | Refills: 0 | Status: SHIPPED | OUTPATIENT
Start: 2024-08-07

## 2024-08-22 ENCOUNTER — OFFICE VISIT (OUTPATIENT)
Dept: INTERNAL MEDICINE CLINIC | Facility: CLINIC | Age: 44
End: 2024-08-22
Payer: MEDICARE

## 2024-08-22 VITALS
TEMPERATURE: 97.6 F | DIASTOLIC BLOOD PRESSURE: 90 MMHG | RESPIRATION RATE: 18 BRPM | OXYGEN SATURATION: 96 % | SYSTOLIC BLOOD PRESSURE: 160 MMHG | WEIGHT: 293 LBS | HEIGHT: 63 IN | BODY MASS INDEX: 51.91 KG/M2 | HEART RATE: 84 BPM

## 2024-08-22 DIAGNOSIS — I10 ESSENTIAL HYPERTENSION: Primary | ICD-10-CM

## 2024-08-22 DIAGNOSIS — F41.9 ACUTE ANXIETY: ICD-10-CM

## 2024-08-22 PROCEDURE — 99214 OFFICE O/P EST MOD 30 MIN: CPT | Performed by: INTERNAL MEDICINE

## 2024-08-22 RX ORDER — LORAZEPAM 0.5 MG/1
0.5 TABLET ORAL 3 TIMES DAILY PRN
Qty: 30 TABLET | Refills: 0 | Status: SHIPPED | OUTPATIENT
Start: 2024-08-22
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
## 2024-08-22 NOTE — PROGRESS NOTES
INTERNAL MEDICINE OFFICE VISIT  [facility]- [place]    NAME: Aisha Edwards  AGE: 44 y.o. SEX: female    DATE OF ENCOUNTER: 8/22/2024    Assessment and Plan/History of Present Illness     Here today for follow up  Medical history of hypertension, anxiety/depression/PTSD, insomnia, migraine, prediabetes        1. Essential hypertension  Assessment & Plan:  Here today to discuss elevated BP/anxiety.  Previously switched from Benicar to lisinopril as Benicar seemed to correlate with vaginal bleeding.  Her vaginal bleeding has resolved with change to lisinopril.  We obtained pelvic ultrasound which did not appear to show any significant pathology  -Her blood pressure has been relatively well-controlled at home up until about a week ago.  She unfortunately has had several family members in and out of the hospital and this has heightened her anxiety and stress levels.  BP has been elevated at home as high as 190/100 on some occasions.  She has not had any significant chest pain, shortness of breath, lightheadedness, visual disturbances.  Did have migraine type headache.  She has tolerated lorazepam in the past on as-needed basis for lab draws/acute anxiety  -/90 today in the office  Plan:  Increase in BP likely anxiety driven.  We are going to-start lorazepam 0.5 mg 3 times daily as needed for acute anxiety.  We reviewed that this is not a good long-term medication for anxiety and side effects including dependency, cognitive dysfunction, sedation, grogginess.  If her anxiety issues persist we will talk about alternative medications  -She will continue with lisinopril monotherapy for now and reach out to me early next week on how she is feeling and how her blood pressure is doing.  If her BP is still not adequately controlled, will add a second agent    2. Acute anxiety  -     LORazepam (Ativan) 0.5 mg tablet; Take 1 tablet (0.5 mg total) by mouth 3 (three) times a day as needed for anxiety    "          No orders of the defined types were placed in this encounter.      Chief Complaint     No chief complaint on file.      Review of Systems     10 point ROS negative except per HPI    The following portions of the patient's history were reviewed and updated as appropriate: allergies, current medications, past family history, past medical history, past social history, past surgical history and problem list.    Objective     /90   Pulse 84   Temp 97.6 °F (36.4 °C)   Resp 18   Ht 5' 3\" (1.6 m)   Wt (!) 147 kg (325 lb)   LMP  (LMP Unknown) Comment: irregular periods  SpO2 96%   BMI 57.57 kg/m²     Physical Exam  Cardiovascular:      Rate and Rhythm: Normal rate and regular rhythm.      Heart sounds: Normal heart sounds. No murmur heard.  Pulmonary:      Effort: Pulmonary effort is normal.      Breath sounds: Normal breath sounds. No wheezing or rales.           Current Medications     Current Outpatient Medications:   •  albuterol (2.5 mg/3 mL) 0.083 % nebulizer solution, Take 3 mL (2.5 mg total) by nebulization every 6 (six) hours as needed for wheezing or shortness of breath, Disp: 180 mL, Rfl: 1  •  albuterol (PROVENTIL HFA,VENTOLIN HFA) 90 mcg/act inhaler, Inhale 2 puffs every 4 (four) hours as needed for wheezing, Disp: 18 g, Rfl: 1  •  amitriptyline (ELAVIL) 75 mg tablet, Take 1 tablet (75 mg total) by mouth daily at bedtime, Disp: 90 tablet, Rfl: 1  •  ergocalciferol (VITAMIN D2) 50,000 units, Take 1 capsule (50,000 Units total) by mouth once a week, Disp: 8 capsule, Rfl: 0  •  furosemide (LASIX) 20 mg tablet, Take 1 tablet (20 mg total) by mouth daily as needed (For leg swelling), Disp: 30 tablet, Rfl: 5  •  hydrOXYzine HCL (ATARAX) 25 mg tablet, Take 1 tablet (25 mg total) by mouth every 6 (six) hours as needed for itching, Disp: 60 tablet, Rfl: 0  •  lisinopril (ZESTRIL) 40 mg tablet, Take 1 tablet (40 mg total) by mouth daily, Disp: 90 tablet, Rfl: 0  •  LORazepam (Ativan) 0.5 mg tablet, " Take 1 tablet (0.5 mg total) by mouth 3 (three) times a day as needed for anxiety, Disp: 30 tablet, Rfl: 0  •  Multiple Vitamin (MULTIVITAMIN) tablet, Take 1 tablet by mouth daily, Disp: , Rfl:   •  nystatin (MYCOSTATIN) powder, Apply topically 3 (three) times a day, Disp: 15 g, Rfl: 0  •  phenazopyridine (PYRIDIUM) 95 MG tablet, Take 2 tablets (190 mg total) by mouth 3 (three) times a day as needed for bladder spasms, Disp: 10 tablet, Rfl: 0  •  prochlorperazine (COMPAZINE) 10 mg tablet, Take 1 tablet (10 mg total) by mouth every 8 (eight) hours as needed for nausea or vomiting (Migraine), Disp: 90 tablet, Rfl: 0  •  SUMAtriptan (Imitrex) 100 mg tablet, Take 1 tablet (100 mg total) by mouth if needed for migraine Take at the onset of migraine; if symptoms continue or return, may take another dose at least 2 hours after first dose. Take no more than 2 doses in a day., Disp: 9 tablet, Rfl: 0  •  traZODone (DESYREL) 50 mg tablet, Take 1 tablet (50 mg total) by mouth daily at bedtime, Disp: 90 tablet, Rfl: 0  •  zolpidem (AMBIEN) 5 mg tablet, Take 1 tablet (5 mg total) by mouth daily at bedtime as needed for sleep, Disp: 30 tablet, Rfl: 0  •  neomycin-polymyxin-hydrocortisone (CORTISPORIN) otic solution, Administer 4 drops into the left ear every 6 (six) hours for 7 days, Disp: 10 mL, Rfl: 0      Garrick Rodríguez D.O.  [facility] [address]  [place] [address]  Office: [phone]  Fax: [phone]

## 2024-09-04 ENCOUNTER — APPOINTMENT (OUTPATIENT)
Dept: LAB | Facility: HOSPITAL | Age: 44
End: 2024-09-04
Payer: MEDICARE

## 2024-09-04 DIAGNOSIS — R73.09 ELEVATED HEMOGLOBIN A1C: ICD-10-CM

## 2024-09-04 DIAGNOSIS — N91.4 SECONDARY OLIGOMENORRHEA: ICD-10-CM

## 2024-09-04 LAB
ALBUMIN SERPL BCG-MCNC: 3.8 G/DL (ref 3.5–5)
ALP SERPL-CCNC: 164 U/L (ref 34–104)
ALT SERPL W P-5'-P-CCNC: 21 U/L (ref 7–52)
ANION GAP SERPL CALCULATED.3IONS-SCNC: 6 MMOL/L (ref 4–13)
AST SERPL W P-5'-P-CCNC: 16 U/L (ref 13–39)
BILIRUB SERPL-MCNC: 0.84 MG/DL (ref 0.2–1)
BUN SERPL-MCNC: 10 MG/DL (ref 5–25)
CALCIUM SERPL-MCNC: 9.3 MG/DL (ref 8.4–10.2)
CHLORIDE SERPL-SCNC: 102 MMOL/L (ref 96–108)
CO2 SERPL-SCNC: 29 MMOL/L (ref 21–32)
CREAT SERPL-MCNC: 0.56 MG/DL (ref 0.6–1.3)
EST. AVERAGE GLUCOSE BLD GHB EST-MCNC: 148 MG/DL
FSH SERPL-ACNC: 14 MIU/ML
GFR SERPL CREATININE-BSD FRML MDRD: 112 ML/MIN/1.73SQ M
GLUCOSE P FAST SERPL-MCNC: 141 MG/DL (ref 65–99)
HBA1C MFR BLD: 6.8 %
LH SERPL-ACNC: 11.5 MIU/ML
POTASSIUM SERPL-SCNC: 4.1 MMOL/L (ref 3.5–5.3)
PROLACTIN SERPL-MCNC: 11.65 NG/ML (ref 3.34–26.72)
PROT SERPL-MCNC: 7.2 G/DL (ref 6.4–8.4)
SODIUM SERPL-SCNC: 137 MMOL/L (ref 135–147)
TSH SERPL DL<=0.05 MIU/L-ACNC: 3.71 UIU/ML (ref 0.45–4.5)

## 2024-09-04 PROCEDURE — 82627 DEHYDROEPIANDROSTERONE: CPT

## 2024-09-04 PROCEDURE — 83036 HEMOGLOBIN GLYCOSYLATED A1C: CPT

## 2024-09-04 PROCEDURE — 80053 COMPREHEN METABOLIC PANEL: CPT

## 2024-09-04 PROCEDURE — 84403 ASSAY OF TOTAL TESTOSTERONE: CPT

## 2024-09-04 PROCEDURE — 83001 ASSAY OF GONADOTROPIN (FSH): CPT

## 2024-09-04 PROCEDURE — 84146 ASSAY OF PROLACTIN: CPT

## 2024-09-04 PROCEDURE — 36415 COLL VENOUS BLD VENIPUNCTURE: CPT

## 2024-09-04 PROCEDURE — 83002 ASSAY OF GONADOTROPIN (LH): CPT

## 2024-09-04 PROCEDURE — 84402 ASSAY OF FREE TESTOSTERONE: CPT

## 2024-09-05 DIAGNOSIS — F51.01 PRIMARY INSOMNIA: Primary | ICD-10-CM

## 2024-09-05 DIAGNOSIS — I10 ESSENTIAL HYPERTENSION: ICD-10-CM

## 2024-09-05 RX ORDER — AMLODIPINE BESYLATE 5 MG/1
5 TABLET ORAL DAILY
Qty: 90 TABLET | Refills: 0 | Status: SHIPPED | OUTPATIENT
Start: 2024-09-05

## 2024-09-06 ENCOUNTER — ANNUAL EXAM (OUTPATIENT)
Dept: GYNECOLOGY | Facility: CLINIC | Age: 44
End: 2024-09-06
Payer: MEDICARE

## 2024-09-06 VITALS
BODY MASS INDEX: 51.91 KG/M2 | HEIGHT: 63 IN | WEIGHT: 293 LBS | SYSTOLIC BLOOD PRESSURE: 138 MMHG | DIASTOLIC BLOOD PRESSURE: 70 MMHG

## 2024-09-06 DIAGNOSIS — N91.4 SECONDARY OLIGOMENORRHEA: Primary | ICD-10-CM

## 2024-09-06 LAB
DHEA-S SERPL-MCNC: 112 UG/DL (ref 41.2–243.7)
TESTOST FREE SERPL-MCNC: 1.2 PG/ML (ref 0–4.2)
TESTOST SERPL-MCNC: 17 NG/DL (ref 4–50)

## 2024-09-06 PROCEDURE — 99213 OFFICE O/P EST LOW 20 MIN: CPT | Performed by: OBSTETRICS & GYNECOLOGY

## 2024-09-06 RX ORDER — MEDROXYPROGESTERONE ACETATE 10 MG
10 TABLET ORAL DAILY
Qty: 10 TABLET | Refills: 1 | Status: SHIPPED | OUTPATIENT
Start: 2024-09-06 | End: 2024-09-16

## 2024-09-06 NOTE — PROGRESS NOTES
"Assessment/Plan:    Will trial provera 10 mg for 10 days with 1 refill. Will discuss POI treatment in light of migraine with aura. Will consider referral to endocrinology.   Pt will be RTO to try pap/pelvic exam.     Diagnoses and all orders for this visit:    Secondary oligomenorrhea  -     medroxyPROGESTERone (PROVERA) 10 mg tablet; Take 1 tablet (10 mg total) by mouth daily for 10 days        Subjective:      Patient ID: Aisha Edwards is a 44 y.o. female.    Pt presents with her . She was initially scheduled for annual exam but does not feel she is ready for pap/pelvic. See last note.   She is agreeable to thyroid, lungs, heart and breast exam.  Reviewed normal PCOS work up although pt has not had menses in years. Discussed POI. Of note, pt has hx of migraine with aura.         The following portions of the patient's history were reviewed and updated as appropriate: allergies, current medications, past family history, past medical history, past social history, past surgical history and problem list.    Review of Systems      Objective:      /70 (BP Location: Right arm, Patient Position: Sitting, Cuff Size: Large)   Ht 5' 3\" (1.6 m)   Wt (!) 148 kg (326 lb)   LMP  (LMP Unknown) Comment: irregular periods  BMI 57.75 kg/m²          Physical Exam  Vitals and nursing note reviewed. Exam conducted with a chaperone present.   Constitutional:       Appearance: Normal appearance.   HENT:      Head: Normocephalic and atraumatic.   Neck:      Thyroid: No thyroid mass or thyromegaly.   Cardiovascular:      Rate and Rhythm: Normal rate and regular rhythm.   Pulmonary:      Effort: Pulmonary effort is normal.      Breath sounds: Normal breath sounds.   Chest:   Breasts:     Right: Normal. No swelling, bleeding, inverted nipple, mass, nipple discharge, skin change or tenderness.      Left: Normal. No swelling, bleeding, inverted nipple, mass, nipple discharge, skin change or tenderness.   Musculoskeletal:   "       General: Normal range of motion.      Cervical back: Normal range of motion.   Skin:     General: Skin is warm and dry.   Neurological:      Mental Status: She is alert and oriented to person, place, and time.   Psychiatric:         Mood and Affect: Mood is anxious.

## 2024-09-24 ENCOUNTER — OFFICE VISIT (OUTPATIENT)
Dept: INTERNAL MEDICINE CLINIC | Facility: CLINIC | Age: 44
End: 2024-09-24
Payer: MEDICARE

## 2024-09-24 VITALS
DIASTOLIC BLOOD PRESSURE: 90 MMHG | WEIGHT: 293 LBS | SYSTOLIC BLOOD PRESSURE: 142 MMHG | HEIGHT: 63 IN | TEMPERATURE: 97.4 F | HEART RATE: 100 BPM | OXYGEN SATURATION: 94 % | BODY MASS INDEX: 51.91 KG/M2 | RESPIRATION RATE: 18 BRPM

## 2024-09-24 DIAGNOSIS — I10 ESSENTIAL HYPERTENSION: ICD-10-CM

## 2024-09-24 DIAGNOSIS — R73.09 ELEVATED HEMOGLOBIN A1C: ICD-10-CM

## 2024-09-24 DIAGNOSIS — R73.03 PREDIABETES: ICD-10-CM

## 2024-09-24 DIAGNOSIS — Z00.00 ANNUAL PHYSICAL EXAM: Primary | ICD-10-CM

## 2024-09-24 PROCEDURE — 99396 PREV VISIT EST AGE 40-64: CPT | Performed by: INTERNAL MEDICINE

## 2024-09-24 PROCEDURE — 99212 OFFICE O/P EST SF 10 MIN: CPT | Performed by: INTERNAL MEDICINE

## 2024-09-24 RX ORDER — OLMESARTAN MEDOXOMIL 5 MG/1
10 TABLET ORAL DAILY
COMMUNITY
Start: 2024-09-16

## 2024-09-24 RX ORDER — ATENOLOL 25 MG/1
25 TABLET ORAL DAILY
Qty: 90 TABLET | Refills: 0 | Status: SHIPPED | OUTPATIENT
Start: 2024-09-24

## 2024-09-24 NOTE — PATIENT INSTRUCTIONS
"Patient Education     Routine physical for adults   The Basics   Written by the doctors and editors at [facility]   What is a physical? -- A physical is a routine visit, or \"check-up,\" with your doctor. You might also hear it called a \"wellness visit\" or \"preventive visit.\"  During each visit, the doctor will:   Ask about your physical and mental health   Ask about your habits, behaviors, and lifestyle   Do an exam   Give you vaccines if needed   Talk to you about any medicines you take   Give advice about your health   Answer your questions  Getting regular check-ups is an important part of taking care of your health. It can help your doctor find and treat any problems you have. But it's also important for preventing health problems.  A routine physical is different from a \"sick visit.\" A sick visit is when you see a doctor because of a health concern or problem. Since physicals are scheduled ahead of time, you can think about what you want to ask the doctor.  How often should I get a physical? -- It depends on your age and health. In general, for people age 21 years and older:   If you are younger than 50 years, you might be able to get a physical every 3 years.   If you are 50 years or older, your doctor might recommend a physical every year.  If you have an ongoing health condition, like diabetes or high blood pressure, your doctor will probably want to see you more often.  What happens during a physical? -- In general, each visit will include:   Physical exam - The doctor or nurse will check your height, weight, heart rate, and blood pressure. They will also look at your eyes and ears. They will ask about how you are feeling and whether you have any symptoms that bother you.   Medicines - It's a good idea to bring a list of all the medicines you take to each doctor visit. Your doctor will talk to you about your medicines and answer any questions. Tell them if you are having any side effects that bother you. You " "should also tell them if you are having trouble paying for any of your medicines.   Habits and behaviors - This includes:   Your diet   Your exercise habits   Whether you smoke, drink alcohol, or use drugs   Whether you are sexually active   Whether you feel safe at home  Your doctor will talk to you about things you can do to improve your health and lower your risk of health problems. They will also offer help and support. For example, if you want to quit smoking, they can give you advice and might prescribe medicines. If you want to improve your diet or get more physical activity, they can help you with this, too.   Lab tests, if needed - The tests you get will depend on your age and situation. For example, your doctor might want to check your:   Cholesterol   Blood sugar   Iron level   Vaccines - The recommended vaccines will depend on your age, health, and what vaccines you already had. Vaccines are very important because they can prevent certain serious or deadly infections.   Discussion of screening - \"Screening\" means checking for diseases or other health problems before they cause symptoms. Your doctor can recommend screening based on your age, risk, and preferences. This might include tests to check for:   Cancer, such as breast, prostate, cervical, ovarian, colorectal, prostate, lung, or skin cancer   Sexually transmitted infections, such as chlamydia and gonorrhea   Mental health conditions like depression and anxiety  Your doctor will talk to you about the different types of screening tests. They can help you decide which screenings to have. They can also explain what the results might mean.   Answering questions - The physical is a good time to ask the doctor or nurse questions about your health. If needed, they can refer you to other doctors or specialists, too.  Adults older than 65 years often need other care, too. As you get older, your doctor will talk to you about:   How to prevent falling at " home   Hearing or vision tests   Memory testing   How to take your medicines safely   Making sure that you have the help and support you need at home  All topics are updated as new evidence becomes available and our peer review process is complete.  This topic retrieved from [employer] on: May 02, 2024.  Topic 342876 Version 1.0  Release: 32.4.3 - C32.122  © 2024 UpToDate, Inc. and/or its affiliates. All rights reserved.  Consumer Information Use and Disclaimer   Disclaimer: This generalized information is a limited summary of diagnosis, treatment, and/or medication information. It is not meant to be comprehensive and should be used as a tool to help the user understand and/or assess potential diagnostic and treatment options. It does NOT include all information about conditions, treatments, medications, side effects, or risks that may apply to a specific patient. It is not intended to be medical advice or a substitute for the medical advice, diagnosis, or treatment of a health care provider based on the health care provider's examination and assessment of a patient's specific and unique circumstances. Patients must speak with a health care provider for complete information about their health, medical questions, and treatment options, including any risks or benefits regarding use of medications. This information does not endorse any treatments or medications as safe, effective, or approved for treating a specific patient. UpToDate, Inc. and its affiliates disclaim any warranty or liability relating to this information or the use thereof.The use of this information is governed by the Terms of Use, available at https://www.woltersTarpon Towersuwer.com/en/know/clinical-effectiveness-terms. 2024© UpToDate, Inc. and its affiliates and/or licensors. All rights reserved.  Copyright   © 2024 UpToDate, Inc. and/or its affiliates. All rights reserved.

## 2024-09-24 NOTE — ASSESSMENT & PLAN NOTE
Previously switched from Benicar to lisinopril as Benicar seemed to correlate with vaginal bleeding.  Vaginal bleeding did resolve with switch to lisinopril.  Pelvic ultrasound did not show any significant pathology.  At our prior visit, she had reported elevated BP readings at home.  She was given as needed prescription for lorazepam to possibly address underlying anxiety which did not help with BP.  Amlodipine was added to her regimen.  She has not noticed any significant improvement in her /90 today, she has had similar readings at home.  Her significant other takes atenolol and she states that several family members of hers also take atenolol with good effect and wonders if this might be beneficial for her as well    Plan:  -Will stop amlodipine, start atenolol 25 mg daily.  Continue with lisinopril 40 mg daily  -Follow-up in 1 month for BP recheck  -Patient does take Lasix on as needed basis for peripheral edema with good effect.  If her BP is not at goal, would consider starting daily diuretic for better BP control        Orders:    atenolol (TENORMIN) 25 mg tablet; Take 1 tablet (25 mg total) by mouth daily

## 2024-09-24 NOTE — ASSESSMENT & PLAN NOTE
A1c 6.8, fasting glucose of 141 on most recent set of labs.  Prior A1c of 6.8 in May 2024.  Did review today that current readings do meet diagnostic criteria for type 2 diabetes  -Will continue to monitor for now.  We are going to recheck labs in approximately 3 to 4 months for reassessment  -Check microalbumin to creatinine ratio  -Would benefit from statin for primary prevention of ASCVD.  Recheck lipid panel with next set of labs

## 2024-09-24 NOTE — PROGRESS NOTES
Adult Annual Physical  Name: Aisha Edwards      : 1980      MRN: [id number]  Encounter Provider: Garrick Rodríguez DO  Encounter Date: 2024   Encounter department: [facility] INTERNAL MEDICINE [place]    Medical history of hypertension, anxiety/depression/PTSD, insomnia, migraine, prediabetes    Assessment & Plan  Annual physical exam         Essential hypertension  Previously switched from Benicar to lisinopril as Benicar seemed to correlate with vaginal bleeding.  Vaginal bleeding did resolve with switch to lisinopril.  Pelvic ultrasound did not show any significant pathology.  At our prior visit, she had reported elevated BP readings at home.  She was given as needed prescription for lorazepam to possibly address underlying anxiety which did not help with BP.  Amlodipine was added to her regimen.  She has not noticed any significant improvement in her /90 today, she has had similar readings at home.  Her significant other takes atenolol and she states that several family members of hers also take atenolol with good effect and wonders if this might be beneficial for her as well    Plan:  -Will stop amlodipine, start atenolol 25 mg daily.  Continue with lisinopril 40 mg daily  -Follow-up in 1 month for BP recheck  -Patient does take Lasix on as needed basis for peripheral edema with good effect.  If her BP is not at goal, would consider starting daily diuretic for better BP control        Orders:    atenolol (TENORMIN) 25 mg tablet; Take 1 tablet (25 mg total) by mouth daily    Elevated hemoglobin A1c    Orders:    Albumin / creatinine urine ratio; Future    Comprehensive metabolic panel; Future    Hemoglobin A1C; Future    CBC and differential; Future    Lipid Panel with Direct LDL reflex; Future    Prediabetes  A1c 6.8, fasting glucose of 141 on most recent set of labs.  Prior A1c of 6.8 in May 2024.  Did review today that current readings do meet diagnostic criteria for type 2  "diabetes  -Will continue to monitor for now.  We are going to recheck labs in approximately 3 to 4 months for reassessment  -Check microalbumin to creatinine ratio  -Would benefit from statin for primary prevention of ASCVD.  Recheck lipid panel with next set of labs           Immunizations and preventive care screenings were discussed with patient today. Appropriate education was printed on patient's after visit summary.    Counseling:  Alcohol/drug use: discussed moderation in alcohol intake, the recommendations for healthy alcohol use, and avoidance of illicit drug use.  Dental Health: discussed importance of regular tooth brushing, flossing, and dental visits.  Injury prevention: discussed safety/seat belts, safety helmets, smoke detectors, carbon dioxide detectors, and smoking near bedding or upholstery.  Sexual health: discussed sexually transmitted diseases, partner selection, use of condoms, avoidance of unintended pregnancy, and contraceptive alternatives.  Exercise: the importance of regular exercise/physical activity was discussed. Recommend exercise 3-5 times per week for at least 30 minutes.          History of Present Illness     Adult Annual Physical:  Patient presents for annual physical.     Diet and Physical Activity:  - Diet/Nutrition: well balanced diet.  - Exercise: walking.    General Health:  - Sleep: 1-3 hours of sleep on average.  - Hearing: normal hearing right ear and normal hearing left ear.  - Vision: no vision problems.  - Dental: regular dental visits.    /GYN Health:  - Follows with GYN: yes.     Review of Systems      Objective     /90 (BP Location: Left arm, Patient Position: Sitting, Cuff Size: Standard)   Pulse 100   Temp (!) 97.4 °F (36.3 °C)   Resp 18   Ht 5' 3\" (1.6 m)   Wt (!) 148 kg (326 lb)   LMP  (LMP Unknown) Comment: irregular periods  SpO2 94%   BMI 57.75 kg/m²     Physical Exam  Constitutional:       Appearance: Normal appearance. She is not " ill-appearing.   HENT:      Head: Normocephalic and atraumatic.      Mouth/Throat:      Mouth: Mucous membranes are moist.      Pharynx: No oropharyngeal exudate.   Eyes:      General: No scleral icterus.        Right eye: No discharge.         Left eye: No discharge.   Cardiovascular:      Rate and Rhythm: Normal rate and regular rhythm.      Heart sounds: No murmur heard.     No friction rub.   Pulmonary:      Effort: Pulmonary effort is normal.      Breath sounds: Normal breath sounds. No wheezing or rales.   Musculoskeletal:         General: No swelling, tenderness or deformity.   Skin:     General: Skin is warm and dry.      Findings: No erythema.   Neurological:      Mental Status: She is alert and oriented to person, place, and time. Mental status is at baseline.      Motor: No weakness.   Psychiatric:         Mood and Affect: Mood normal.         Behavior: Behavior normal.

## 2024-09-27 ENCOUNTER — NURSE TRIAGE (OUTPATIENT)
Age: 44
End: 2024-09-27

## 2024-09-27 ENCOUNTER — PATIENT MESSAGE (OUTPATIENT)
Dept: GYNECOLOGY | Facility: CLINIC | Age: 44
End: 2024-09-27

## 2024-09-27 NOTE — TELEPHONE ENCOUNTER
Regarding: heavy bleeding/severe pain  ----- Message from Vickie MEDINA sent at 9/27/2024  3:51 PM EDT -----  Pt sent a myc message that she is having heavy bleeding more then she ever had and is extreme pain.

## 2024-09-27 NOTE — TELEPHONE ENCOUNTER
Patient calling in to report severe heavy vaginal bleeding and 7-10/10 pelvic pain. States heavy vaginal bleeding has been ongoing for 5 days. Reports soaking a maxi pad each hour. States pelvic pain becomes so severe she is on the floor crying. Currently pain rated 7/10, but states it worsens in the evening. Passing nickel-sized blood clots.    Patient states she is not sure if symptoms are so severe because she has not had a menses in 15 years. Took provera as prescribed by provider at last office visit.     Advised patient go to [facility] ER for evaluation. Patient agreeable, unsure which ER she will go to, states she needs to talk to  first.     Routing to provider as FYI.  Reason for Disposition   SEVERE abdominal pain (e.g., excruciating)   SEVERE vaginal bleeding (e.g., soaking 2 pads or tampons per hour and present 2 or more hours; 1 menstrual cup every 2 hours)    Protocols used: Vaginal Bleeding - Abnormal-ADULT-[place]

## 2024-10-21 DIAGNOSIS — F41.9 ACUTE ANXIETY: ICD-10-CM

## 2024-10-22 RX ORDER — LORAZEPAM 0.5 MG/1
0.5 TABLET ORAL 3 TIMES DAILY PRN
Qty: 30 TABLET | Refills: 0 | Status: SHIPPED | OUTPATIENT
Start: 2024-10-22

## 2024-10-25 ENCOUNTER — OFFICE VISIT (OUTPATIENT)
Dept: INTERNAL MEDICINE CLINIC | Facility: CLINIC | Age: 44
End: 2024-10-25
Payer: MEDICARE

## 2024-10-25 VITALS
BODY MASS INDEX: 51.91 KG/M2 | OXYGEN SATURATION: 93 % | HEART RATE: 69 BPM | RESPIRATION RATE: 18 BRPM | TEMPERATURE: 95 F | HEIGHT: 63 IN | DIASTOLIC BLOOD PRESSURE: 90 MMHG | WEIGHT: 293 LBS | SYSTOLIC BLOOD PRESSURE: 142 MMHG

## 2024-10-25 DIAGNOSIS — I10 ESSENTIAL HYPERTENSION: Primary | ICD-10-CM

## 2024-10-25 PROCEDURE — 99214 OFFICE O/P EST MOD 30 MIN: CPT | Performed by: INTERNAL MEDICINE

## 2024-10-25 RX ORDER — ATENOLOL 25 MG/1
TABLET ORAL
COMMUNITY
Start: 2024-09-24

## 2024-10-25 NOTE — ASSESSMENT & PLAN NOTE
"Here today for blood pressure follow-up. Previously switched from Benicar to lisinopril as Benicar seemed to correlate with vaginal bleeding.  Vaginal bleeding did resolve with switch to lisinopril.  Pelvic ultrasound did not show any significant pathology  -She continued to have elevated BP readings and amlodipine was added to her regimen, she did not notice any significant improvement in her BP.  Significant other took atenolol and she stated that several family members of hers also take atenolol with good effect  -At last visit, amlodipine was stopped, she was started on atenolol 25 mg daily, continued on lisinopril    BP of 142/90 today in the office.  Patient reports similar readings at home.  She has been tolerating atenolol without issue.  States that she did get intermittent palpitations previously which seem to be resolved now that she is started the atenolol. She does feel more \"cold\" since starting the medication.  Otherwise tolerating without issue    Plan:  -BP still not quite at goal.  We will continue with current regimen for now atenolol 25 mg daily, lisinopril 40 mg daily  -Can consider up titration of atenolol at follow-up if needed           "

## 2024-10-25 NOTE — PROGRESS NOTES
"Ambulatory Visit  Name: Aisha Edwards      : 1980      MRN: [id number]  Encounter Provider: Garrick Rodríguez DO  Encounter Date: 10/25/2024   Encounter department: [facility] INTERNAL MEDICINE [place]    Medical history of hypertension, anxiety/depression/PTSD, insomnia, migraine, prediabetes    Assessment & Plan  Essential hypertension  Here today for blood pressure follow-up. Previously switched from Benicar to lisinopril as Benicar seemed to correlate with vaginal bleeding.  Vaginal bleeding did resolve with switch to lisinopril.  Pelvic ultrasound did not show any significant pathology  -She continued to have elevated BP readings and amlodipine was added to her regimen, she did not notice any significant improvement in her BP.  Significant other took atenolol and she stated that several family members of hers also take atenolol with good effect  -At last visit, amlodipine was stopped, she was started on atenolol 25 mg daily, continued on lisinopril    BP of 142/90 today in the office.  Patient reports similar readings at home.  She has been tolerating atenolol without issue.  States that she did get intermittent palpitations previously which seem to be resolved now that she is started the atenolol. She does feel more \"cold\" since starting the medication.  Otherwise tolerating without issue    Plan:  -BP still not quite at goal.  We will continue with current regimen for now atenolol 25 mg daily, lisinopril 40 mg daily  -Can consider up titration of atenolol at follow-up if needed              History of Present Illness     HPI      Review of Systems        Objective     /90 (BP Location: Left arm, Patient Position: Sitting, Cuff Size: Standard)   Pulse 69   Temp (!) 95 °F (35 °C)   Resp 18   Ht 5' 3\" (1.6 m)   Wt (!) 148 kg (327 lb)   LMP  (LMP Unknown) Comment: irregular periods  SpO2 93%   BMI 57.93 kg/m²     Physical Exam    "

## 2024-10-30 ENCOUNTER — ANNUAL EXAM (OUTPATIENT)
Dept: GYNECOLOGY | Facility: CLINIC | Age: 44
End: 2024-10-30
Payer: MEDICARE

## 2024-10-30 VITALS
HEIGHT: 63 IN | BODY MASS INDEX: 51.91 KG/M2 | WEIGHT: 293 LBS | SYSTOLIC BLOOD PRESSURE: 133 MMHG | DIASTOLIC BLOOD PRESSURE: 80 MMHG

## 2024-10-30 DIAGNOSIS — Z12.4 ENCOUNTER FOR PAPANICOLAOU SMEAR FOR CERVICAL CANCER SCREENING: Primary | ICD-10-CM

## 2024-10-30 PROCEDURE — G0145 SCR C/V CYTO,THINLAYER,RESCR: HCPCS | Performed by: OBSTETRICS & GYNECOLOGY

## 2024-10-30 PROCEDURE — 99213 OFFICE O/P EST LOW 20 MIN: CPT | Performed by: OBSTETRICS & GYNECOLOGY

## 2024-10-30 PROCEDURE — G0476 HPV COMBO ASSAY CA SCREEN: HCPCS | Performed by: OBSTETRICS & GYNECOLOGY

## 2024-10-30 NOTE — PROGRESS NOTES
"Assessment/Plan:    Pap obtained. Will call pt with results at which time, can discuss need for EMB and treatments for POI with hx of migraine with aura.    Diagnoses and all orders for this visit:    Encounter for Papanicolaou smear for cervical cancer screening  -     Liquid-based pap, screening        Subjective:      Patient ID: Aisha Edwards is a 44 y.o. female.    Pt presents for pap and pelvic.   She has been seen on two other occasions for breast exam. Today she feels she can proceed with pap and pelvic.   Of note, she has a hx of PCOS, was given provera last month and had a very heavy bleed after years of not having a period. She does not meet the criteria for PCO on blood work or ovarian volume on ultrasound. Discussed POI. Pt has a hx of migraine with aura. Discussed the need for EMB which pt feels will probably need to be done with EUA.          The following portions of the patient's history were reviewed and updated as appropriate: allergies, current medications, past family history, past medical history, past social history, past surgical history and problem list.    Review of Systems   Constitutional: Negative.    HENT: Negative.     Respiratory: Negative.     Cardiovascular: Negative.    Gastrointestinal: Negative.    Endocrine: Negative.    Genitourinary:  Positive for menstrual problem. Negative for difficulty urinating, dysuria, frequency, pelvic pain, urgency, vaginal bleeding, vaginal discharge and vaginal pain.   Musculoskeletal: Negative.    Skin: Negative.    Neurological: Negative.    Psychiatric/Behavioral:  The patient is nervous/anxious.          Objective:      /80   Ht 5' 3\" (1.6 m)   Wt (!) 148 kg (327 lb)   LMP  (LMP Unknown) Comment: irregular periods  BMI 57.93 kg/m²          Physical Exam  Vitals and nursing note reviewed. Exam conducted with a chaperone present.   Constitutional:       Appearance: Normal appearance.   HENT:      Head: Normocephalic and atraumatic. "   Pulmonary:      Effort: Pulmonary effort is normal.   Abdominal:      Hernia: There is no hernia in the left inguinal area or right inguinal area.   Genitourinary:     General: Normal vulva.      Exam position: Supine.      Pubic Area: No rash.       Labia:         Right: No rash, tenderness, lesion or injury.         Left: No rash, tenderness, lesion or injury.       Urethra: No urethral pain, urethral swelling or urethral lesion.      Vagina: No signs of injury and foreign body. No vaginal discharge, erythema, tenderness, bleeding, lesions or prolapsed vaginal walls.      Cervix: No cervical motion tenderness, discharge, friability, lesion, erythema, cervical bleeding or eversion.      Uterus: Not deviated, not enlarged, not fixed and not tender.       Adnexa:         Right: No mass, tenderness or fullness.          Left: No mass, tenderness or fullness.        Comments: Exam and pap limited by anxiety and body habitus  Musculoskeletal:         General: Normal range of motion.      Cervical back: Normal range of motion.   Lymphadenopathy:      Lower Body: No right inguinal adenopathy. No left inguinal adenopathy.   Skin:     General: Skin is warm and dry.   Neurological:      Mental Status: She is alert and oriented to person, place, and time.

## 2024-10-31 DIAGNOSIS — F32.A ANXIETY AND DEPRESSION: ICD-10-CM

## 2024-10-31 DIAGNOSIS — F41.9 ANXIETY AND DEPRESSION: ICD-10-CM

## 2024-10-31 DIAGNOSIS — L30.4 INTERTRIGO: ICD-10-CM

## 2024-10-31 DIAGNOSIS — R60.0 PERIPHERAL EDEMA: ICD-10-CM

## 2024-10-31 DIAGNOSIS — G43.711 INTRACTABLE CHRONIC MIGRAINE WITHOUT AURA AND WITH STATUS MIGRAINOSUS: ICD-10-CM

## 2024-10-31 DIAGNOSIS — F51.01 PRIMARY INSOMNIA: ICD-10-CM

## 2024-10-31 LAB
HPV HR 12 DNA CVX QL NAA+PROBE: NEGATIVE
HPV16 DNA CVX QL NAA+PROBE: NEGATIVE
HPV18 DNA CVX QL NAA+PROBE: NEGATIVE

## 2024-10-31 RX ORDER — FUROSEMIDE 20 MG/1
20 TABLET ORAL DAILY PRN
Qty: 30 TABLET | Refills: 5 | Status: SHIPPED | OUTPATIENT
Start: 2024-10-31

## 2024-10-31 RX ORDER — AMITRIPTYLINE HYDROCHLORIDE 75 MG/1
75 TABLET ORAL
Qty: 30 TABLET | Refills: 5 | Status: SHIPPED | OUTPATIENT
Start: 2024-10-31

## 2024-11-01 RX ORDER — ZOLPIDEM TARTRATE 5 MG/1
5 TABLET ORAL
Qty: 30 TABLET | Refills: 0 | Status: SHIPPED | OUTPATIENT
Start: 2024-11-01

## 2024-11-01 RX ORDER — NYSTATIN 100000 [USP'U]/G
POWDER TOPICAL 3 TIMES DAILY
Qty: 15 G | Refills: 0 | Status: SHIPPED | OUTPATIENT
Start: 2024-11-01

## 2024-11-04 LAB
LAB AP GYN PRIMARY INTERPRETATION: NORMAL
Lab: NORMAL

## 2024-11-25 ENCOUNTER — OFFICE VISIT (OUTPATIENT)
Dept: INTERNAL MEDICINE CLINIC | Facility: CLINIC | Age: 44
End: 2024-11-25
Payer: MEDICARE

## 2024-11-25 VITALS
BODY MASS INDEX: 51.91 KG/M2 | WEIGHT: 293 LBS | HEIGHT: 63 IN | HEART RATE: 89 BPM | SYSTOLIC BLOOD PRESSURE: 180 MMHG | RESPIRATION RATE: 18 BRPM | OXYGEN SATURATION: 92 % | DIASTOLIC BLOOD PRESSURE: 100 MMHG | TEMPERATURE: 98 F

## 2024-11-25 DIAGNOSIS — H60.332 ACUTE SWIMMER'S EAR OF LEFT SIDE: Primary | ICD-10-CM

## 2024-11-25 PROCEDURE — 99213 OFFICE O/P EST LOW 20 MIN: CPT | Performed by: INTERNAL MEDICINE

## 2024-11-25 RX ORDER — NEOMYCIN SULFATE, POLYMYXIN B SULFATE, HYDROCORTISONE 3.5; 10000; 1 MG/ML; [USP'U]/ML; MG/ML
4 SOLUTION/ DROPS AURICULAR (OTIC) EVERY 6 HOURS SCHEDULED
Qty: 10 ML | Refills: 1 | Status: SHIPPED | OUTPATIENT
Start: 2024-11-25 | End: 2024-12-02

## 2024-11-25 NOTE — PROGRESS NOTES
Name: Aisha Edwards      : 1980      MRN: [id number]  Encounter Provider: Garrick Rodríguez DO  Encounter Date: 2024   Encounter department: [facility] MEDIC[facility]  :  Medical history of hypertension, anxiety/depression/PTSD, insomnia, migraine, prediabetes      Assessment & Plan  Acute swimmer's ear of left side  Patient reports onset of left-sided ear pain within the past several days.  She has history of recurrent ear infection/otitis externa.  Does not report any otorrhea.  Pain radiates into the sinus area.  No fevers, chills.  Afebrile    On exam, she has significant pain with pulling of the pinna and pressing of the tragus.  There seems to be evidence of mild external auditory canal edema and erythema    Will treat with course of topical Corticosporin drops.  I advised her to let me know by midweek if her symptoms are not improved  Continue with supportive care otherwise    Orders:    neomycin-polymyxin-hydrocortisone (CORTISPORIN) otic solution; Administer 4 drops into the left ear every 6 (six) hours for 7 days           History of Present Illness     Earache   There is pain in the right ear. This is a recurrent problem. The current episode started yesterday. The problem occurs constantly. The problem has been rapidly worsening. There has been no fever. The fever has been present for Less than 1 day. The pain is at a severity of 9/10. Associated symptoms include ear discharge and headaches. Pertinent negatives include no abdominal pain, coughing, diarrhea, hearing loss, neck pain, rash, rhinorrhea, sore throat or vomiting.     Review of Systems   HENT:  Positive for ear discharge and ear pain. Negative for hearing loss, rhinorrhea and sore throat.    Respiratory:  Negative for cough.    Gastrointestinal:  Negative for abdominal pain, diarrhea and vomiting.   Musculoskeletal:  Negative for neck pain.   Skin:  Negative for rash.   Neurological:  Positive for headaches.           Objective   Resp 18   LMP  (LMP Unknown) Comment: irregular periods     Physical Exam  HENT:      Right Ear: Tympanic membrane normal. There is no impacted cerumen.      Ears:      Comments: Erythema and edema of external auditory canal

## 2024-11-26 ENCOUNTER — PATIENT MESSAGE (OUTPATIENT)
Dept: INTERNAL MEDICINE CLINIC | Facility: CLINIC | Age: 44
End: 2024-11-26

## 2024-11-26 ENCOUNTER — TELEPHONE (OUTPATIENT)
Age: 44
End: 2024-11-26

## 2024-11-26 DIAGNOSIS — F51.01 PRIMARY INSOMNIA: ICD-10-CM

## 2024-11-26 DIAGNOSIS — F32.9 CURRENT EPISODE OF MAJOR DEPRESSIVE DISORDER WITHOUT PRIOR EPISODE, UNSPECIFIED DEPRESSION EPISODE SEVERITY: ICD-10-CM

## 2024-11-26 DIAGNOSIS — H92.09 EARACHE: Primary | ICD-10-CM

## 2024-11-26 DIAGNOSIS — R05.1 ACUTE COUGH: Primary | ICD-10-CM

## 2024-11-26 DIAGNOSIS — H66.90 ACUTE OTITIS MEDIA, UNSPECIFIED OTITIS MEDIA TYPE: ICD-10-CM

## 2024-11-26 RX ORDER — LEVOFLOXACIN 500 MG/1
500 TABLET, FILM COATED ORAL EVERY 24 HOURS
Qty: 7 TABLET | Refills: 0 | Status: SHIPPED | OUTPATIENT
Start: 2024-11-26 | End: 2024-12-03

## 2024-11-26 NOTE — TELEPHONE ENCOUNTER
Patient called stating the ear pain is getting worse and she is not getting nasal and chest congestion. Patient has a history of getting bronchitis quickly. Patient would like to discuss this with Dr. Hernández. Please advise and return patients call.

## 2024-11-26 NOTE — TELEPHONE ENCOUNTER
Recommend starting oral antibiotic (Levaquin) in addition to eardrops.  Sent for 7-day course of once daily Levaquin to the pharmacy

## 2024-11-27 ENCOUNTER — HOSPITAL ENCOUNTER (OUTPATIENT)
Dept: RADIOLOGY | Facility: HOSPITAL | Age: 44
Discharge: HOME | End: 2024-11-27
Payer: MEDICARE

## 2024-11-27 ENCOUNTER — RESULTS FOLLOW-UP (OUTPATIENT)
Dept: INTERNAL MEDICINE CLINIC | Facility: CLINIC | Age: 44
End: 2024-11-27

## 2024-11-27 DIAGNOSIS — R05.1 ACUTE COUGH: ICD-10-CM

## 2024-11-27 PROCEDURE — 71046 X-RAY EXAM CHEST 2 VIEWS: CPT

## 2024-11-27 RX ORDER — TRAZODONE HYDROCHLORIDE 50 MG/1
50 TABLET, FILM COATED ORAL
Qty: 90 TABLET | Refills: 1 | Status: SHIPPED | OUTPATIENT
Start: 2024-11-27

## 2024-12-13 DIAGNOSIS — G43.719 INTRACTABLE CHRONIC MIGRAINE WITHOUT AURA AND WITHOUT STATUS MIGRAINOSUS: ICD-10-CM

## 2024-12-13 RX ORDER — PROCHLORPERAZINE MALEATE 10 MG
10 TABLET ORAL EVERY 8 HOURS PRN
Qty: 90 TABLET | Refills: 1 | Status: SHIPPED | OUTPATIENT
Start: 2024-12-13

## 2024-12-18 ENCOUNTER — OFFICE VISIT (OUTPATIENT)
Dept: INTERNAL MEDICINE CLINIC | Facility: CLINIC | Age: 44
End: 2024-12-18
Payer: MEDICARE

## 2024-12-18 VITALS
DIASTOLIC BLOOD PRESSURE: 100 MMHG | TEMPERATURE: 97.9 F | HEART RATE: 82 BPM | SYSTOLIC BLOOD PRESSURE: 160 MMHG | BODY MASS INDEX: 51.91 KG/M2 | OXYGEN SATURATION: 95 % | HEIGHT: 63 IN | RESPIRATION RATE: 18 BRPM | WEIGHT: 293 LBS

## 2024-12-18 DIAGNOSIS — G44.209 TENSION HEADACHE: Primary | ICD-10-CM

## 2024-12-18 DIAGNOSIS — I10 ESSENTIAL HYPERTENSION: ICD-10-CM

## 2024-12-18 PROCEDURE — 99213 OFFICE O/P EST LOW 20 MIN: CPT | Performed by: INTERNAL MEDICINE

## 2024-12-18 RX ORDER — ATENOLOL 25 MG/1
25 TABLET ORAL 2 TIMES DAILY
Qty: 180 TABLET | Refills: 0 | Status: SHIPPED | OUTPATIENT
Start: 2024-12-18

## 2024-12-18 NOTE — PROGRESS NOTES
"Name: Aisha Edwards      : 1980      MRN: [id number]  Encounter Provider: Garrick Rodríguez DO  Encounter Date: 2024   Encounter department[facility]  :  Medical history of hypertension, anxiety/depression/PTSD, insomnia, migraine, prediabetes      Here today for evaluation of headache  Assessment & Plan  Tension headache  Here today for evaluation of headache.  Worsening headaches over the past 2 to 3 weeks.  Described as a tension-like sensation, she does not feel it is similar to her migraines.  She feels that it correlates with elevated blood pressure readings.  She has been taking atenolol 25 mg daily.  She feels that within an hour of taking the atenolol she tends to feel calmer but then has recurrence of headache in the evenings.  She is also tried taking her atenolol in the evenings and finds that her headaches can then occur in the morning    /100 today in the office, 150/90 on repeat    Plan:  -Probable tension type headache.  Seems to correlate with elevated BP readings  -We are going to increase atenolol to 25 mg twice daily   -continue to monitor symptoms at home and log BP  -Will reassess at follow-up appointment in January         Essential hypertension    Orders:  •  atenolol (TENORMIN) 25 mg tablet; Take 1 tablet (25 mg total) by mouth 2 (two) times a day           History of Present Illness     HPI  Review of Systems    Objective   /100 (BP Location: Left arm, Patient Position: Sitting, Cuff Size: Standard)   Pulse 82   Temp 97.9 °F (36.6 °C) (Tympanic)   Resp 18   Ht 5' 3\" (1.6 m)   Wt (!) 150 kg (331 lb)   LMP  (LMP Unknown) Comment: irregular periods  SpO2 95%   BMI 58.63 kg/m²      Physical Exam    "

## 2024-12-18 NOTE — ASSESSMENT & PLAN NOTE
Orders:  •  atenolol (TENORMIN) 25 mg tablet; Take 1 tablet (25 mg total) by mouth 2 (two) times a day

## 2024-12-22 ENCOUNTER — PATIENT MESSAGE (OUTPATIENT)
Dept: INTERNAL MEDICINE CLINIC | Facility: CLINIC | Age: 44
End: 2024-12-22

## 2024-12-22 DIAGNOSIS — H92.09 OTALGIA, UNSPECIFIED LATERALITY: Primary | ICD-10-CM

## 2024-12-26 ENCOUNTER — HOSPITAL ENCOUNTER (EMERGENCY)
Facility: HOSPITAL | Age: 44
Discharge: HOME | End: 2024-12-26
Attending: EMERGENCY MEDICINE
Payer: MEDICARE

## 2024-12-26 ENCOUNTER — APPOINTMENT (EMERGENCY)
Dept: RADIOLOGY | Facility: HOSPITAL | Age: 44
End: 2024-12-26
Payer: MEDICARE

## 2024-12-26 VITALS
HEART RATE: 91 BPM | OXYGEN SATURATION: 94 % | TEMPERATURE: 98.1 F | DIASTOLIC BLOOD PRESSURE: 78 MMHG | RESPIRATION RATE: 16 BRPM | SYSTOLIC BLOOD PRESSURE: 170 MMHG

## 2024-12-26 DIAGNOSIS — R07.9 CHEST PAIN: Primary | ICD-10-CM

## 2024-12-26 LAB
2HR DELTA HS TROPONIN: 0 NG/L
ANION GAP SERPL CALCULATED.3IONS-SCNC: 9 MMOL/L (ref 4–13)
BASOPHILS # BLD AUTO: 0.01 THOUSANDS/ÂΜL (ref 0–0.1)
BASOPHILS NFR BLD AUTO: 0 % (ref 0–1)
BUN SERPL-MCNC: 8 MG/DL (ref 5–25)
CALCIUM SERPL-MCNC: 9.2 MG/DL (ref 8.4–10.2)
CARDIAC TROPONIN I PNL SERPL HS: 4 NG/L (ref ?–50)
CARDIAC TROPONIN I PNL SERPL HS: 4 NG/L (ref ?–50)
CHLORIDE SERPL-SCNC: 100 MMOL/L (ref 96–108)
CO2 SERPL-SCNC: 26 MMOL/L (ref 21–32)
CREAT SERPL-MCNC: 0.62 MG/DL (ref 0.6–1.3)
EOSINOPHIL # BLD AUTO: 0.03 THOUSAND/ÂΜL (ref 0–0.61)
EOSINOPHIL NFR BLD AUTO: 0 % (ref 0–6)
ERYTHROCYTE [DISTWIDTH] IN BLOOD BY AUTOMATED COUNT: 13.6 % (ref 11.6–15.1)
FLUAV AG UPPER RESP QL IA.RAPID: NEGATIVE
FLUBV AG UPPER RESP QL IA.RAPID: NEGATIVE
GFR SERPL CREATININE-BSD FRML MDRD: 109 ML/MIN/1.73SQ M
GLUCOSE SERPL-MCNC: 146 MG/DL (ref 65–140)
HCT VFR BLD AUTO: 44.9 % (ref 34.8–46.1)
HGB BLD-MCNC: 14.2 G/DL (ref 11.5–15.4)
IMM GRANULOCYTES # BLD AUTO: 0.07 THOUSAND/UL (ref 0–0.2)
IMM GRANULOCYTES NFR BLD AUTO: 1 % (ref 0–2)
LYMPHOCYTES # BLD AUTO: 0.74 THOUSANDS/ÂΜL (ref 0.6–4.47)
LYMPHOCYTES NFR BLD AUTO: 11 % (ref 14–44)
MCH RBC QN AUTO: 27 PG (ref 26.8–34.3)
MCHC RBC AUTO-ENTMCNC: 31.6 G/DL (ref 31.4–37.4)
MCV RBC AUTO: 86 FL (ref 82–98)
MONOCYTES # BLD AUTO: 0.77 THOUSAND/ÂΜL (ref 0.17–1.22)
MONOCYTES NFR BLD AUTO: 12 % (ref 4–12)
NEUTROPHILS # BLD AUTO: 5.09 THOUSANDS/ÂΜL (ref 1.85–7.62)
NEUTS SEG NFR BLD AUTO: 76 % (ref 43–75)
NRBC BLD AUTO-RTO: 0 /100 WBCS
PLATELET # BLD AUTO: 271 THOUSANDS/UL (ref 149–390)
PMV BLD AUTO: 9 FL (ref 8.9–12.7)
POTASSIUM SERPL-SCNC: 3.8 MMOL/L (ref 3.5–5.3)
RBC # BLD AUTO: 5.25 MILLION/UL (ref 3.81–5.12)
SARS-COV+SARS-COV-2 AG RESP QL IA.RAPID: NEGATIVE
SODIUM SERPL-SCNC: 135 MMOL/L (ref 135–147)
WBC # BLD AUTO: 6.71 THOUSAND/UL (ref 4.31–10.16)

## 2024-12-26 PROCEDURE — 96375 TX/PRO/DX INJ NEW DRUG ADDON: CPT

## 2024-12-26 PROCEDURE — 80048 BASIC METABOLIC PNL TOTAL CA: CPT

## 2024-12-26 PROCEDURE — 93005 ELECTROCARDIOGRAM TRACING: CPT

## 2024-12-26 PROCEDURE — 99285 EMERGENCY DEPT VISIT HI MDM: CPT

## 2024-12-26 PROCEDURE — 87804 INFLUENZA ASSAY W/OPTIC: CPT

## 2024-12-26 PROCEDURE — 99285 EMERGENCY DEPT VISIT HI MDM: CPT | Performed by: EMERGENCY MEDICINE

## 2024-12-26 PROCEDURE — 36415 COLL VENOUS BLD VENIPUNCTURE: CPT

## 2024-12-26 PROCEDURE — 96374 THER/PROPH/DIAG INJ IV PUSH: CPT

## 2024-12-26 PROCEDURE — 71046 X-RAY EXAM CHEST 2 VIEWS: CPT

## 2024-12-26 PROCEDURE — 87811 SARS-COV-2 COVID19 W/OPTIC: CPT

## 2024-12-26 PROCEDURE — 85025 COMPLETE CBC W/AUTO DIFF WBC: CPT

## 2024-12-26 PROCEDURE — 84484 ASSAY OF TROPONIN QUANT: CPT

## 2024-12-26 RX ORDER — HYDROMORPHONE HCL/PF 1 MG/ML
0.5 SYRINGE (ML) INJECTION ONCE
Refills: 0 | Status: COMPLETED | OUTPATIENT
Start: 2024-12-26 | End: 2024-12-26

## 2024-12-26 RX ORDER — HYDROMORPHONE HCL IN WATER/PF 6 MG/30 ML
0.2 PATIENT CONTROLLED ANALGESIA SYRINGE INTRAVENOUS ONCE
Status: COMPLETED | OUTPATIENT
Start: 2024-12-26 | End: 2024-12-26

## 2024-12-26 RX ADMIN — HYDROMORPHONE HYDROCHLORIDE 0.2 MG: 0.2 INJECTION, SOLUTION INTRAMUSCULAR; INTRAVENOUS; SUBCUTANEOUS at 15:59

## 2024-12-26 RX ADMIN — HYDROMORPHONE HYDROCHLORIDE 0.5 MG: 1 INJECTION, SOLUTION INTRAMUSCULAR; INTRAVENOUS; SUBCUTANEOUS at 15:36

## 2024-12-26 NOTE — ED PROVIDER NOTES
Time reflects when diagnosis was documented in both MDM as applicable and the Disposition within this note       Time User Action Codes Description Comment    12/26/2024  1:04 PM Jose Valderrama Add [R07.9] Chest pain           ED Disposition       ED Disposition   Discharge    Condition   Stable    Date/Time   u Dec 26, 2024  3:16 PM    Comment   Aisha CHERELLE Edwards discharge to home/self care.                   Assessment & Plan       Medical Decision Making  Patient 44-year-old female presenting for concerns of chest pain, cough  DDx: Viral syndrome, bacterial pneumonia, ACS, arrhythmia, electrolyte abnormality, anemia, pneumothorax, dissection, pericarditis, myocarditis  Based on patient presentation and physical exam findings, primary concern is for viral versus bacterial pneumonia however given pressure-like chest pain will obtain cardiac lab workup to rule out ACS/cardiac cause of chest pain.  Patient currently denies any shortness of breath, no plans for breathing treatment at this time.  Dispo pending labs and imaging.  -Labs and imaging unremarkable aside from EKG showing nonspecific intraventricular conduction delay.  Given these findings we will plan for cardiology referral.  Return precautions given.  Patient understands and agrees with plan.  Ready for discharge.    Problems Addressed:  Chest pain: acute illness or injury    Amount and/or Complexity of Data Reviewed  Labs: ordered. Decision-making details documented in ED Course.  Radiology: ordered.    Risk  Prescription drug management.        ED Course as of 12/26/24 1549   Thu Dec 26, 2024   1300 hs TnI 0hr: 4       Medications   HYDROmorphone (DILAUDID) injection 0.5 mg (0.5 mg Intravenous Given 12/26/24 1536)       ED Risk Strat Scores   HEART Risk Score      Flowsheet Row Most Recent Value   Heart Score Risk Calculator    History 1 Filed at: 12/26/2024 1515   ECG 1 Filed at: 12/26/2024 1515   Age 0 Filed at: 12/26/2024 1515   Risk Factors 1 Filed  "at: 2024 1515   Troponin 0 Filed at: 2024 1515   HEART Score 3 Filed at: 2024 1515          HEART Risk Score      Flowsheet Row Most Recent Value   Heart Score Risk Calculator    History 1 Filed at: 2024 1515   ECG 1 Filed at: 2024 1515   Age 0 Filed at: 2024 1515   Risk Factors 1 Filed at: 2024 1515   Troponin 0 Filed at: 2024 1515   HEART Score 3 Filed at: 2024 1515                                                History of Present Illness       Chief Complaint   Patient presents with    Chest Pain     Pt states that she feels like her \"chest will explode\". Denies SOB       Past Medical History:   Diagnosis Date    Allergic     Asthma     seasonal asthma    Essential hypertension 3/17/2023    Insomnia     Migraine     Neuromuscular disorder (HCC)     RLS with PLMD    Restless leg syndrome       Past Surgical History:   Procedure Laterality Date    ADENOIDECTOMY       SECTION      AK EXCISION GANGLION WRIST DORSAL/VOLAR PRIMARY Right 2017    Procedure: WRIST EXCISION OF VOLAR GANGLION CYST ;  Surgeon: Geo Cho MD;  Location: [facility];  Service: Orthopedics    TONSILECTOMY AND ADNOIDECTOMY        History reviewed. No pertinent family history.   Social History     Tobacco Use    Smoking status: Never    Smokeless tobacco: Never   Vaping Use    Vaping status: Never Used   Substance Use Topics    Alcohol use: No    Drug use: No      E-Cigarette/Vaping    E-Cigarette Use Never User       E-Cigarette/Vaping Substances    Nicotine No     THC No     CBD No     Flavoring No     Other No     Unknown No       I have reviewed and agree with the history as documented.     HPI  Patient is 44-year-old female presenting for concerns of cough, chest pain/pressure substernal that began today.  Past medical history significant for anxiety, obesity, asthma.  Patient denies any shortness of breath.  Has not had to use her inhaler more frequently.  No known sick " contacts.  Denies any nausea vomiting diarrhea.  Just endorses cough congestion and the new onset chest pain that began approximate hour or 2 prior to arrival to the ER.  Was not doing anything exertional at the time.  States the pain is intermittent.  No recent travel recent surgeries history of blood clots not on any estrogen form of medication.  Review of Systems   Constitutional:  Positive for chills.   HENT:  Positive for congestion and rhinorrhea.    Eyes: Negative.    Respiratory:  Positive for cough.    Cardiovascular:  Positive for chest pain.   Gastrointestinal: Negative.    Endocrine: Negative.    Genitourinary: Negative.    Musculoskeletal: Negative.    Skin: Negative.    Allergic/Immunologic: Negative.    Neurological: Negative.    Hematological: Negative.    Psychiatric/Behavioral: Negative.     All other systems reviewed and are negative.          Objective       ED Triage Vitals   Temperature Pulse Blood Pressure Respirations SpO2 Patient Position - Orthostatic VS   12/26/24 1122 12/26/24 1122 12/26/24 1122 12/26/24 1122 12/26/24 1122 12/26/24 1445   98.1 °F (36.7 °C) 100 (!) 186/95 20 98 % Lying      Temp src Heart Rate Source BP Location FiO2 (%) Pain Score    -- 12/26/24 1445 12/26/24 1445 -- 12/26/24 1536     Monitor Right arm  9      Vitals      Date and Time Temp Pulse SpO2 Resp BP Pain Score FACES Pain Rating User   12/26/24 1536 -- -- -- -- -- 9 -- BL   12/26/24 1445 -- 91 94 % 16 170/78 -- -- MH   12/26/24 1122 98.1 °F (36.7 °C) 100 98 % 20 186/95 -- -- [place]            Physical Exam  Vitals and nursing note reviewed.   Constitutional:       Appearance: Normal appearance. She is normal weight.   HENT:      Head: Normocephalic and atraumatic.      Right Ear: Tympanic membrane, ear canal and external ear normal.      Left Ear: Tympanic membrane, ear canal and external ear normal.      Nose: Nose normal.      Mouth/Throat:      Mouth: Mucous membranes are moist.      Pharynx: Oropharynx is clear.    Eyes:      Extraocular Movements: Extraocular movements intact.      Conjunctiva/sclera: Conjunctivae normal.      Pupils: Pupils are equal, round, and reactive to light.   Cardiovascular:      Rate and Rhythm: Normal rate and regular rhythm.      Pulses: Normal pulses.      Heart sounds: Normal heart sounds.   Pulmonary:      Effort: Pulmonary effort is normal.      Breath sounds: Normal breath sounds. No decreased breath sounds, wheezing, rhonchi or rales.   Abdominal:      General: Abdomen is flat. Bowel sounds are normal.      Palpations: Abdomen is soft.   Musculoskeletal:         General: Normal range of motion.      Cervical back: Normal range of motion and neck supple.   Skin:     General: Skin is warm and dry.      Capillary Refill: Capillary refill takes less than 2 seconds.   Neurological:      General: No focal deficit present.      Mental Status: She is alert and oriented to person, place, and time.   Psychiatric:         Mood and Affect: Mood normal.         Behavior: Behavior normal.         Thought Content: Thought content normal.         Judgment: Judgment normal.         Results Reviewed       Procedure Component Value Units Date/Time    HS Troponin I 2hr [[id number]]  (Normal) Collected: 12/26/24 1437    Lab Status: Final result Specimen: Blood from Arm, Left Updated: 12/26/24 1513     hs TnI 2hr 4 ng/L      Delta 2hr hsTnI 0 ng/L     HS Troponin 0hr (reflex protocol) [[id number]]  (Normal) Collected: 12/26/24 1159    Lab Status: Final result Specimen: Blood from Arm, Left Updated: 12/26/24 1239     hs TnI 0hr 4 ng/L     FLU/COVID Rapid Antigen (30 min. TAT) - Preferred screening test in ED [[id number]]  (Normal) Collected: 12/26/24 1159    Lab Status: Final result Specimen: Nares from Nose Updated: 12/26/24 1236     SARS COV Rapid Antigen Negative     Influenza A Rapid Antigen Negative     Influenza B Rapid Antigen Negative    Narrative:      This test has been performed using the [employer] Danuta 2  FLU+SARS Antigen test under the Emergency Use Authorization (EUA). This test has been validated by the  and verified by the performing laboratory. The Danuta uses lateral flow immunofluorescent sandwich assay to detect SARS-COV, Influenza A and Influenza B Antigen.     The [employer] Danuta 2 SARS Antigen test does not differentiate between SARS-CoV and SARS-CoV-2.     Negative results are presumptive and may be confirmed with a molecular assay, if necessary, for patient management. Negative results do not rule out SARS-CoV-2 or influenza infection and should not be used as the sole basis for treatment or patient management decisions. A negative test result may occur if the level of antigen in a sample is below the limit of detection of this test.     Positive results are indicative of the presence of viral antigens, but do not rule out bacterial infection or co-infection with other viruses.     All test results should be used as an adjunct to clinical observations and other information available to the provider.    FOR PEDIATRIC PATIENTS - copy/paste COVID Guidelines URL to browser: [email or web address]    Basic metabolic panel [[id number]]  (Abnormal) Collected: 12/26/24 1159    Lab Status: Final result Specimen: Blood from Arm, Left Updated: 12/26/24 1233     Sodium 135 mmol/L      Potassium 3.8 mmol/L      Chloride 100 mmol/L      CO2 26 mmol/L      ANION GAP 9 mmol/L      BUN 8 mg/dL      Creatinine 0.62 mg/dL      Glucose 146 mg/dL      Calcium 9.2 mg/dL      eGFR 109 ml/min/1.73sq m     Narrative:      National Kidney Disease Foundation guidelines for Chronic Kidney Disease (CKD):     Stage 1 with normal or high GFR (GFR > 90 mL/min/1.73 square meters)    Stage 2 Mild CKD (GFR = 60-89 mL/min/1.73 square meters)    Stage 3A Moderate CKD (GFR = 45-59 mL/min/1.73 square meters)    Stage 3B Moderate CKD (GFR = 30-44 mL/min/1.73 square meters)    Stage 4  Severe CKD (GFR = 15-29 mL/min/1.73 square meters)    Stage 5 End Stage CKD (GFR <15 mL/min/1.73 square meters)  Note: GFR calculation is accurate only with a steady state creatinine    CBC and differential [[id number]]  (Abnormal) Collected: 12/26/24 1159    Lab Status: Final result Specimen: Blood from Arm, Left Updated: 12/26/24 1216     WBC 6.71 Thousand/uL      RBC 5.25 Million/uL      Hemoglobin 14.2 g/dL      Hematocrit 44.9 %      MCV 86 fL      MCH 27.0 pg      MCHC 31.6 g/dL      RDW 13.6 %      MPV 9.0 fL      Platelets 271 Thousands/uL      nRBC 0 /100 WBCs      Segmented % 76 %      Immature Grans % 1 %      Lymphocytes % 11 %      Monocytes % 12 %      Eosinophils Relative 0 %      Basophils Relative 0 %      Absolute Neutrophils 5.09 Thousands/µL      Absolute Immature Grans 0.07 Thousand/uL      Absolute Lymphocytes 0.74 Thousands/µL      Absolute Monocytes 0.77 Thousand/µL      Eosinophils Absolute 0.03 Thousand/µL      Basophils Absolute 0.01 Thousands/µL             XR chest 2 views   Final Interpretation by Tobias Pacheco MD (12/26 0500)      No acute cardiopulmonary disease.            Workstation performed: [id number]             ECG 12 Lead Documentation Only    Date/Time: 12/26/2024 1:05 PM    Performed by: Jose Valderrama MD  Authorized by: Jose Valderrama MD    Indications / Diagnosis:  Chest pain  ECG reviewed by me, the ED Provider: yes    Patient location:  ED  Interpretation:     Interpretation: non-specific    Rate:     ECG rate assessment: normal    Rhythm:     Rhythm: sinus rhythm    Ectopy:     Ectopy: none    QRS:     QRS axis:  Normal    QRS intervals:  Wide  Conduction:     Conduction: abnormal      Abnormal conduction: non-specific intraventricular conduction delay    ST segments:     ST segments:  Normal  T waves:     T waves: normal    ECG 12 Lead Documentation Only    Date/Time: 12/26/2024 3:47 PM    Performed by: Jose Valderrama MD  Authorized by: Jose Valderrama MD     Indications / Diagnosis:  Chest pain repeat ECG  ECG reviewed by me, the ED Provider: yes    Patient location:  ED  Previous ECG:     Previous ECG:  Compared to current    Similarity:  No change  Interpretation:     Interpretation: non-specific    Rate:     ECG rate assessment: normal    Rhythm:     Rhythm: sinus rhythm    Ectopy:     Ectopy: none    QRS:     QRS axis:  Normal    QRS intervals:  Wide  Conduction:     Conduction: abnormal      Abnormal conduction: non-specific intraventricular conduction delay    ST segments:     ST segments:  Normal  T waves:     T waves: normal        ED Medication and Procedure Management   Prior to Admission Medications   Prescriptions Last Dose Informant Patient Reported? Taking?   LORazepam (Ativan) 0.5 mg tablet   No No   Sig: Take 1 tablet (0.5 mg total) by mouth 3 (three) times a day as needed for anxiety   Multiple Vitamin (MULTIVITAMIN) tablet  Self Yes No   Sig: Take 1 tablet by mouth daily   SUMAtriptan (Imitrex) 100 mg tablet  Self No No   Sig: Take 1 tablet (100 mg total) by mouth if needed for migraine Take at the onset of migraine; if symptoms continue or return, may take another dose at least 2 hours after first dose. Take no more than 2 doses in a day.   albuterol (2.5 mg/3 mL) 0.083 % nebulizer solution   No No   Sig: Take 3 mL (2.5 mg total) by nebulization every 6 (six) hours as needed for wheezing or shortness of breath   albuterol (PROVENTIL HFA,VENTOLIN HFA) 90 mcg/act inhaler   No No   Sig: Inhale 2 puffs every 4 (four) hours as needed for wheezing   amitriptyline (ELAVIL) 75 mg tablet   No No   Sig: Take 1 tablet (75 mg total) by mouth daily at bedtime   atenolol (TENORMIN) 25 mg tablet   No No   Sig: Take 1 tablet (25 mg total) by mouth 2 (two) times a day   ergocalciferol (VITAMIN D2) 50,000 units   No No   Sig: Take 1 capsule (50,000 Units total) by mouth once a week   furosemide (LASIX) 20 mg tablet   No No   Sig: Take 1 tablet (20 mg total) by mouth  daily as needed (For leg swelling)   hydrOXYzine HCL (ATARAX) 25 mg tablet  Self No No   Sig: Take 1 tablet (25 mg total) by mouth every 6 (six) hours as needed for itching   lisinopril (ZESTRIL) 40 mg tablet   No No   Sig: Take 1 tablet (40 mg total) by mouth daily   neomycin-polymyxin-hydrocortisone (CORTISPORIN) otic solution   No No   Sig: Administer 4 drops into the left ear every 6 (six) hours for 7 days   nystatin (MYCOSTATIN) powder   No No   Sig: Apply topically 3 (three) times a day   olmesartan (BENICAR) 5 mg tablet   Yes No   Sig: Take 10 mg by mouth daily   phenazopyridine (PYRIDIUM) 95 MG tablet   No No   Sig: Take 2 tablets (190 mg total) by mouth 3 (three) times a day as needed for bladder spasms   prochlorperazine (COMPAZINE) 10 mg tablet   No No   Sig: Take 1 tablet (10 mg total) by mouth every 8 (eight) hours as needed for nausea or vomiting (Migraine)   traZODone (DESYREL) 50 mg tablet   No No   Sig: Take 1 tablet (50 mg total) by mouth daily at bedtime   zolpidem (AMBIEN) 5 mg tablet   No No   Sig: Take 1 tablet (5 mg total) by mouth daily at bedtime as needed for sleep      Facility-Administered Medications: None     Patient's Medications   Discharge Prescriptions    No medications on file       ED SEPSIS DOCUMENTATION   Time reflects when diagnosis was documented in both MDM as applicable and the Disposition within this note       Time User Action Codes Description Comment    12/26/2024  1:04 PM Jose Valderrama Add [R07.9] Chest pain                  Jose Valderrama MD  12/26/24 1758

## 2024-12-27 ENCOUNTER — TELEPHONE (OUTPATIENT)
Dept: ADMINISTRATIVE | Facility: OTHER | Age: 44
End: 2024-12-27

## 2024-12-27 LAB
ATRIAL RATE: 95 BPM
ATRIAL RATE: 96 BPM
P AXIS: 46 DEGREES
P AXIS: 59 DEGREES
PR INTERVAL: 146 MS
PR INTERVAL: 156 MS
QRS AXIS: -11 DEGREES
QRS AXIS: 73 DEGREES
QRSD INTERVAL: 120 MS
QRSD INTERVAL: 122 MS
QT INTERVAL: 374 MS
QT INTERVAL: 380 MS
QTC INTERVAL: 473 MS
QTC INTERVAL: 477 MS
T WAVE AXIS: 52 DEGREES
T WAVE AXIS: 53 DEGREES
VENTRICULAR RATE: 95 BPM
VENTRICULAR RATE: 96 BPM

## 2024-12-27 PROCEDURE — 93010 ELECTROCARDIOGRAM REPORT: CPT | Performed by: INTERNAL MEDICINE

## 2024-12-27 NOTE — ED ATTENDING ATTESTATION
12/26/2024  I, Geo Kwon DO, saw and evaluated the patient. I have discussed the patient with the resident/non-physician practitioner and agree with the resident's/non-physician practitioner's findings, Plan of Care, and MDM as documented in the resident's/non-physician practitioner's note, except where noted. All available labs and Radiology studies were reviewed.  I was present for key portions of any procedure(s) performed by the resident/non-physician practitioner and I was immediately available to provide assistance.       At this point I agree with the current assessment done in the Emergency Department.  I have conducted an independent evaluation of this patient a history and physical is as follows:    44-year-old female, chest pain cough.  Ongoing for few days.  Chest pain started few hours prior to arrival.  No hemoptysis no fevers.  Normal vitals other than hypertension.  Normal exam.  Plan EKG troponin rule out ACS doubt PE pneumothorax will get chest x-ray as well rule out pneumonia    ED Course         Critical Care Time  Procedures

## 2024-12-27 NOTE — TELEPHONE ENCOUNTER
12/27/24 11:00 AM    Patient contacted post ED visit, first outreach attempt made. Message was left for patient to return a call to the [facility] Department at [facility]: Phone [phone].    Thank you.  Nasra Fulton MA  [facility]

## 2024-12-30 NOTE — TELEPHONE ENCOUNTER
12/30/24 11:28 AM    Patient contacted post ED visit, [facility] phone outreaches documented. Patient called practice and scheduled a follow-up ED visit.     Thank you.  Nasra Fulton MA  [facility]

## 2025-01-14 DIAGNOSIS — F41.9 ACUTE ANXIETY: ICD-10-CM

## 2025-01-14 DIAGNOSIS — I10 ESSENTIAL HYPERTENSION: ICD-10-CM

## 2025-01-14 RX ORDER — ATENOLOL 25 MG/1
25 TABLET ORAL 2 TIMES DAILY
Qty: 180 TABLET | Refills: 0 | OUTPATIENT
Start: 2025-01-14

## 2025-01-15 RX ORDER — LORAZEPAM 0.5 MG/1
0.5 TABLET ORAL 3 TIMES DAILY PRN
Qty: 30 TABLET | Refills: 0 | Status: SHIPPED | OUTPATIENT
Start: 2025-01-15

## 2025-01-15 RX ORDER — OLMESARTAN MEDOXOMIL 5 MG/1
10 TABLET ORAL DAILY
Qty: 180 TABLET | Refills: 1 | OUTPATIENT
Start: 2025-01-15

## 2025-01-15 RX ORDER — LORAZEPAM 0.5 MG/1
0.5 TABLET ORAL 3 TIMES DAILY PRN
Qty: 30 TABLET | Refills: 0 | OUTPATIENT
Start: 2025-01-15

## 2025-01-22 ENCOUNTER — APPOINTMENT (OUTPATIENT)
Dept: LAB | Facility: HOSPITAL | Age: 45
End: 2025-01-22
Payer: MEDICARE

## 2025-01-22 DIAGNOSIS — R73.09 ELEVATED HEMOGLOBIN A1C: ICD-10-CM

## 2025-01-22 LAB
ALBUMIN SERPL BCG-MCNC: 4.1 G/DL (ref 3.5–5)
ALP SERPL-CCNC: 168 U/L (ref 34–104)
ALT SERPL W P-5'-P-CCNC: 15 U/L (ref 7–52)
ANION GAP SERPL CALCULATED.3IONS-SCNC: 8 MMOL/L (ref 4–13)
AST SERPL W P-5'-P-CCNC: 15 U/L (ref 13–39)
BASOPHILS # BLD AUTO: 0.02 THOUSANDS/ΜL (ref 0–0.1)
BASOPHILS NFR BLD AUTO: 0 % (ref 0–1)
BILIRUB SERPL-MCNC: 1.02 MG/DL (ref 0.2–1)
BUN SERPL-MCNC: 9 MG/DL (ref 5–25)
CALCIUM SERPL-MCNC: 9.2 MG/DL (ref 8.4–10.2)
CHLORIDE SERPL-SCNC: 102 MMOL/L (ref 96–108)
CHOLEST SERPL-MCNC: 177 MG/DL (ref ?–200)
CO2 SERPL-SCNC: 27 MMOL/L (ref 21–32)
CREAT SERPL-MCNC: 0.51 MG/DL (ref 0.6–1.3)
CREAT UR-MCNC: 15.6 MG/DL
EOSINOPHIL # BLD AUTO: 0.16 THOUSAND/ΜL (ref 0–0.61)
EOSINOPHIL NFR BLD AUTO: 2 % (ref 0–6)
ERYTHROCYTE [DISTWIDTH] IN BLOOD BY AUTOMATED COUNT: 13.6 % (ref 11.6–15.1)
EST. AVERAGE GLUCOSE BLD GHB EST-MCNC: 160 MG/DL
GFR SERPL CREATININE-BSD FRML MDRD: 116 ML/MIN/1.73SQ M
GLUCOSE P FAST SERPL-MCNC: 133 MG/DL (ref 65–99)
HBA1C MFR BLD: 7.2 %
HCT VFR BLD AUTO: 44.5 % (ref 34.8–46.1)
HDLC SERPL-MCNC: 44 MG/DL
HGB BLD-MCNC: 13.9 G/DL (ref 11.5–15.4)
IMM GRANULOCYTES # BLD AUTO: 0.05 THOUSAND/UL (ref 0–0.2)
IMM GRANULOCYTES NFR BLD AUTO: 1 % (ref 0–2)
LDLC SERPL CALC-MCNC: 113 MG/DL (ref 0–100)
LYMPHOCYTES # BLD AUTO: 2.15 THOUSANDS/ΜL (ref 0.6–4.47)
LYMPHOCYTES NFR BLD AUTO: 25 % (ref 14–44)
MCH RBC QN AUTO: 26.6 PG (ref 26.8–34.3)
MCHC RBC AUTO-ENTMCNC: 31.2 G/DL (ref 31.4–37.4)
MCV RBC AUTO: 85 FL (ref 82–98)
MICROALBUMIN UR-MCNC: <7 MG/L
MONOCYTES # BLD AUTO: 0.58 THOUSAND/ΜL (ref 0.17–1.22)
MONOCYTES NFR BLD AUTO: 7 % (ref 4–12)
NEUTROPHILS # BLD AUTO: 5.54 THOUSANDS/ΜL (ref 1.85–7.62)
NEUTS SEG NFR BLD AUTO: 65 % (ref 43–75)
NRBC BLD AUTO-RTO: 0 /100 WBCS
PLATELET # BLD AUTO: 295 THOUSANDS/UL (ref 149–390)
PMV BLD AUTO: 9.1 FL (ref 8.9–12.7)
POTASSIUM SERPL-SCNC: 4.2 MMOL/L (ref 3.5–5.3)
PROT SERPL-MCNC: 7.4 G/DL (ref 6.4–8.4)
RBC # BLD AUTO: 5.23 MILLION/UL (ref 3.81–5.12)
SODIUM SERPL-SCNC: 137 MMOL/L (ref 135–147)
TRIGL SERPL-MCNC: 99 MG/DL (ref ?–150)
WBC # BLD AUTO: 8.5 THOUSAND/UL (ref 4.31–10.16)

## 2025-01-22 PROCEDURE — 80061 LIPID PANEL: CPT

## 2025-01-22 PROCEDURE — 82570 ASSAY OF URINE CREATININE: CPT

## 2025-01-22 PROCEDURE — 83036 HEMOGLOBIN GLYCOSYLATED A1C: CPT

## 2025-01-22 PROCEDURE — 82043 UR ALBUMIN QUANTITATIVE: CPT

## 2025-01-22 PROCEDURE — 36415 COLL VENOUS BLD VENIPUNCTURE: CPT

## 2025-01-22 PROCEDURE — 80053 COMPREHEN METABOLIC PANEL: CPT

## 2025-01-22 PROCEDURE — 85025 COMPLETE CBC W/AUTO DIFF WBC: CPT

## 2025-01-23 ENCOUNTER — OFFICE VISIT (OUTPATIENT)
Dept: INTERNAL MEDICINE CLINIC | Facility: CLINIC | Age: 45
End: 2025-01-23
Payer: MEDICARE

## 2025-01-23 VITALS
BODY MASS INDEX: 51.91 KG/M2 | OXYGEN SATURATION: 98 % | HEIGHT: 63 IN | HEART RATE: 82 BPM | SYSTOLIC BLOOD PRESSURE: 154 MMHG | WEIGHT: 293 LBS | DIASTOLIC BLOOD PRESSURE: 82 MMHG | TEMPERATURE: 96.2 F

## 2025-01-23 DIAGNOSIS — E78.5 DYSLIPIDEMIA: ICD-10-CM

## 2025-01-23 DIAGNOSIS — I10 ESSENTIAL HYPERTENSION: ICD-10-CM

## 2025-01-23 DIAGNOSIS — E66.813 CLASS 3 OBESITY: Primary | ICD-10-CM

## 2025-01-23 DIAGNOSIS — E11.9 TYPE 2 DIABETES MELLITUS WITHOUT COMPLICATION, WITHOUT LONG-TERM CURRENT USE OF INSULIN (HCC): ICD-10-CM

## 2025-01-23 PROCEDURE — 99214 OFFICE O/P EST MOD 30 MIN: CPT | Performed by: INTERNAL MEDICINE

## 2025-01-23 RX ORDER — PHENTERMINE HYDROCHLORIDE 15 MG/1
15 CAPSULE ORAL EVERY MORNING
Qty: 30 CAPSULE | Refills: 0 | Status: SHIPPED | OUTPATIENT
Start: 2025-01-23

## 2025-01-23 RX ORDER — ROSUVASTATIN CALCIUM 20 MG/1
20 TABLET, COATED ORAL DAILY
Qty: 100 TABLET | Refills: 3 | Status: SHIPPED | OUTPATIENT
Start: 2025-01-23

## 2025-01-23 RX ORDER — ATENOLOL 50 MG/1
50 TABLET ORAL 2 TIMES DAILY
Qty: 180 TABLET | Refills: 0 | Status: SHIPPED | OUTPATIENT
Start: 2025-01-23

## 2025-01-23 RX ORDER — TOPIRAMATE SPINKLE 25 MG/1
25 CAPSULE ORAL
Qty: 30 CAPSULE | Refills: 0 | Status: SHIPPED | OUTPATIENT
Start: 2025-01-23

## 2025-01-23 NOTE — ASSESSMENT & PLAN NOTE
Patient previously weighed around 500 pounds at her heaviest.  She has had some success with weight loss but appears to have plateaued around 310 to 330 pounds  -She is a candidate for pharmacotherapy.  Previously sent for Qsymia which I do not believe was covered by insurance.  I would prefer that she try a GLP-1 RA especially with her concurrent diabetes however she does not like needles as above.  After discussion, we will prescribe separate generic medications for phentermine and topiramate  -Continue to monitor for adverse effects.  She has had some issues with high BP at home and I stressed that phentermine can potentially raise blood pressure and that this should be closely monitored outside the office   -we will increase atenolol dose as above.     Follow-up in 1 month for recheck    -She is a candidate for pharmacotherapy with BMI greater than 30.  She has not achieved weight loss goals with a trial of at least 3 to 6 months of lifestyle modification  -We reviewed weight loss medication options.  She is most interested in Qsymia  -Start Qsymia.  Counseled on common side effects.  Advised her to check BP and heart rate 3 times weekly at home with phentermine use  -Follow-up in 3 months  Orders:  •  phentermine 15 MG capsule; Take 1 capsule (15 mg total) by mouth every morning  •  topiramate (TOPAMAX) 25 mg sprinkle capsule; Take 1 capsule (25 mg total) by mouth daily at bedtime

## 2025-01-23 NOTE — ASSESSMENT & PLAN NOTE
Lab Results   Component Value Date    HGBA1C 7.2 (H) 01/22/2025     Current medications: None  Statin: Yes  Albuminuria/ACEi/ARB: No significant microalbuminuria  Retinopathy: Due for exam    A1c slightly above goal.  Will continue to monitor off medication for now.  I suggested starting GLP1RA today to assist with better glycemic control and weight loss, however Aisha is not comfortable with needles and it does not appear this would be a good option for her.  Continue with healthy dietary and lifestyle choices for now  -She is agreeable to starting statin today.  Sent for rosuvastatin to pharmacy  -Will check foot exam at follow-up visit in 1 month

## 2025-01-23 NOTE — ASSESSMENT & PLAN NOTE
See plan for weight loss  Will increase atenolol to 50 mg twice daily    Continue to closely monitor BP at home, will reassess at follow-up in 1 month  Orders:  •  atenolol (TENORMIN) 50 mg tablet; Take 1 tablet (50 mg total) by mouth 2 (two) times a day

## 2025-01-23 NOTE — PROGRESS NOTES
Name: Aisha Agee      : 1980      MRN: [id number]  Encounter Provider: Garrick Rodríguez DO  Encounter Date: 2025   Encounter department: [facility] [place]  :  Medical history of hypertension, anxiety/depression/PTSD, insomnia, migraine, prediabetes     Here today for follow-up  Assessment & Plan  Essential hypertension  See plan for weight loss  Will increase atenolol to 50 mg twice daily    Continue to closely monitor BP at home, will reassess at follow-up in 1 month  Orders:  •  atenolol (TENORMIN) 50 mg tablet; Take 1 tablet (50 mg total) by mouth 2 (two) times a day    Class 3 obesity  Patient previously weighed around 500 pounds at her heaviest.  She has had some success with weight loss but appears to have plateaued around 310 to 330 pounds  -She is a candidate for pharmacotherapy.  Previously sent for Qsymia which I do not believe was covered by insurance.  I would prefer that she try a GLP-1 RA especially with her concurrent diabetes however she does not like needles as above.  After discussion, we will prescribe separate generic medications for phentermine and topiramate  -Continue to monitor for adverse effects.  She has had some issues with high BP at home and I stressed that phentermine can potentially raise blood pressure and that this should be closely monitored outside the office   -we will increase atenolol dose as above.     Follow-up in 1 month for recheck    -She is a candidate for pharmacotherapy with BMI greater than 30.  She has not achieved weight loss goals with a trial of at least 3 to 6 months of lifestyle modification  -We reviewed weight loss medication options.  She is most interested in Qsymia  -Start Qsymia.  Counseled on common side effects.  Advised her to check BP and heart rate 3 times weekly at home with phentermine use  -Follow-up in 3 months  Orders:  •  phentermine 15 MG capsule; Take 1 capsule (15 mg total) by mouth every morning  •   "topiramate (TOPAMAX) 25 mg sprinkle capsule; Take 1 capsule (25 mg total) by mouth daily at bedtime    Dyslipidemia  Start rosuvastatin  Orders:  •  rosuvastatin (CRESTOR) 20 MG tablet; Take 1 tablet (20 mg total) by mouth daily    Type 2 diabetes mellitus without complication, without long-term current use of insulin ([facility])    Lab Results   Component Value Date    HGBA1C 7.2 (H) 01/22/2025     Current medications: None  Statin: Yes  Albuminuria/ACEi/ARB: No significant microalbuminuria  Retinopathy: Due for exam    A1c slightly above goal.  Will continue to monitor off medication for now.  I suggested starting GLP1RA today to assist with better glycemic control and weight loss, however Aisha is not comfortable with needles and it does not appear this would be a good option for her.  Continue with healthy dietary and lifestyle choices for now  -She is agreeable to starting statin today.  Sent for rosuvastatin to pharmacy  -Will check foot exam at follow-up visit in 1 month              History of Present Illness   HPI  Review of Systems    Objective   /82 (BP Location: Left arm, Patient Position: Sitting, Cuff Size: Large)   Pulse 82   Temp (!) 96.2 °F (35.7 °C) (Temporal)   Ht 5' 3\" (1.6 m)   Wt (!) 150 kg (331 lb)   LMP  (LMP Unknown) Comment: irregular periods  SpO2 98%   BMI 58.63 kg/m²      Physical Exam    "

## 2025-01-24 ENCOUNTER — TELEPHONE (OUTPATIENT)
Age: 45
End: 2025-01-24

## 2025-01-24 NOTE — TELEPHONE ENCOUNTER
PA for phentermine 15 MG SUBMITTED to [employer]    via    [x]CMM-KEY: [id number]  []Surescripts-Case ID #   []Availity-Auth ID # NDC #   []Faxed to plan   []Other website   []Phone call Case ID #     []PA sent as URGENT    All office notes, labs and other pertaining documents and studies sent. Clinical questions answered. Awaiting determination from insurance company.     Turnaround time for your insurance to make a decision on your Prior Authorization can take 7-21 business days.

## 2025-01-27 ENCOUNTER — PATIENT MESSAGE (OUTPATIENT)
Dept: GYNECOLOGY | Facility: CLINIC | Age: 45
End: 2025-01-27

## 2025-01-27 DIAGNOSIS — N91.4 SECONDARY OLIGOMENORRHEA: Primary | ICD-10-CM

## 2025-01-28 RX ORDER — MEDROXYPROGESTERONE ACETATE 10 MG
10 TABLET ORAL DAILY
Qty: 10 TABLET | Refills: 0 | Status: SHIPPED | OUTPATIENT
Start: 2025-01-28 | End: 2025-02-07

## 2025-02-16 DIAGNOSIS — F51.01 PRIMARY INSOMNIA: ICD-10-CM

## 2025-02-17 RX ORDER — ZOLPIDEM TARTRATE 5 MG/1
5 TABLET ORAL
Qty: 30 TABLET | Refills: 0 | Status: SHIPPED | OUTPATIENT
Start: 2025-02-17

## 2025-02-18 DIAGNOSIS — J45.909 ASTHMA, UNSPECIFIED ASTHMA SEVERITY, UNSPECIFIED WHETHER COMPLICATED, UNSPECIFIED WHETHER PERSISTENT: ICD-10-CM

## 2025-02-19 RX ORDER — ALBUTEROL SULFATE 90 UG/1
2 INHALANT RESPIRATORY (INHALATION) EVERY 4 HOURS PRN
Qty: 18 G | Refills: 1 | Status: SHIPPED | OUTPATIENT
Start: 2025-02-19

## 2025-02-21 ENCOUNTER — OFFICE VISIT (OUTPATIENT)
Dept: INTERNAL MEDICINE CLINIC | Facility: CLINIC | Age: 45
End: 2025-02-21
Payer: MEDICARE

## 2025-02-21 VITALS
SYSTOLIC BLOOD PRESSURE: 140 MMHG | HEART RATE: 64 BPM | DIASTOLIC BLOOD PRESSURE: 88 MMHG | TEMPERATURE: 97.8 F | BODY MASS INDEX: 51.91 KG/M2 | HEIGHT: 63 IN | WEIGHT: 293 LBS

## 2025-02-21 DIAGNOSIS — E66.813 CLASS 3 OBESITY: Primary | ICD-10-CM

## 2025-02-21 DIAGNOSIS — I10 ESSENTIAL HYPERTENSION: ICD-10-CM

## 2025-02-21 PROCEDURE — 99214 OFFICE O/P EST MOD 30 MIN: CPT | Performed by: INTERNAL MEDICINE

## 2025-02-21 RX ORDER — TOPIRAMATE 50 MG/1
50 TABLET, FILM COATED ORAL
Qty: 90 TABLET | Refills: 0 | Status: SHIPPED | OUTPATIENT
Start: 2025-02-21

## 2025-02-21 NOTE — ASSESSMENT & PLAN NOTE
Seems to be relatively well-controlled based upon home readings.  Systolic readings reportedly around 135-140/60-80 diastolic.  At our last appointment, atenolol was increased to 50 mg twice daily.  It does appear that she is no longer taking lisinopril    We will continue with atenolol monotherapy for now  If additional agent needed in the future, consider adding lisinopril back on

## 2025-02-21 NOTE — ASSESSMENT & PLAN NOTE
See plan for weight loss  Will increase atenolol to 50 mg twice daily    Continue to closely monitor BP at home, will reassess at follow-up in 1 month

## 2025-02-21 NOTE — ASSESSMENT & PLAN NOTE
Previously weighed around 500 pounds at her heaviest.  She has had some success with weight loss but appears to have plateaued around 310 to 330 pounds.  She has been started on generic phentermine and topiramate at our last visit and comes in for 1 month follow-up.  She appears to be tolerating medications well without significant side effects.  Has been monitoring BP at home which seems to be relatively well-controlled.  She has lost approximately 11 pounds according to her office weights which also correlates with what she has noted on her scale at home    Plan:  -Continue phentermine 15 mg daily  -Increase topiramate to 50 mg daily at bedtime  -Continue with healthy dietary and lifestyle choices  -Will reassess in 3 months      Orders:  •  topiramate (Topamax) 50 MG tablet; Take 1 tablet (50 mg total) by mouth daily at bedtime

## 2025-02-21 NOTE — PROGRESS NOTES
"Name: Aisha Agee      : 1980      MRN: [id number]  Encounter Provider: Garrick Rodríguez DO  Encounter Date: 2025   Encounter [facility]  :  Medical history of hypertension, anxiety/depression/PTSD, insomnia, migraine, prediabetes     Assessment & Plan  Class 3 obesity  Previously weighed around 500 pounds at her heaviest.  She has had some success with weight loss but appears to have plateaued around 310 to 330 pounds.  She has been started on generic phentermine and topiramate at our last visit and comes in for 1 month follow-up.  She appears to be tolerating medications well without significant side effects.  Has been monitoring BP at home which seems to be relatively well-controlled.  She has lost approximately 11 pounds according to her office weights which also correlates with what she has noted on her scale at home    Plan:  -Continue phentermine 15 mg daily  -Increase topiramate to 50 mg daily at bedtime  -Continue with healthy dietary and lifestyle choices  -Will reassess in 3 months      Orders:  •  topiramate (Topamax) 50 MG tablet; Take 1 tablet (50 mg total) by mouth daily at bedtime    Essential hypertension  Seems to be relatively well-controlled based upon home readings.  Systolic readings reportedly around 135-140/60-80 diastolic.  At our last appointment, atenolol was increased to 50 mg twice daily.  It does appear that she is no longer taking lisinopril    We will continue with atenolol monotherapy for now  If additional agent needed in the future, consider adding lisinopril back on                History of Present Illness   HPI  Review of Systems    Objective   /88   Pulse 64   Temp 97.8 °F (36.6 °C)   Ht 5' 3\" (1.6 m)   Wt (!) 145 kg (320 lb)   LMP  (LMP Unknown) Comment: irregular periods  BMI 56.69 kg/m²      Physical Exam  Cardiovascular:      Pulses: no weak pulses.           Dorsalis pedis pulses are 2+ on the right " side and 2+ on the left side.        Posterior tibial pulses are 2+ on the right side and 2+ on the left side.   Feet:      Right foot:      Skin integrity: No ulcer, skin breakdown, erythema, warmth, callus or dry skin.      Left foot:      Skin integrity: No ulcer, skin breakdown, erythema, warmth, callus or dry skin.           Diabetic Foot Exam    Patient's shoes and socks removed.    Right Foot/Ankle   Right Foot Inspection  Skin Exam: skin normal and skin intact. No dry skin, no warmth, no callus, no erythema, no maceration, no abnormal color, no pre-ulcer, no ulcer and no callus.     Toe Exam: ROM and strength within normal limits.     Sensory   Vibration: intact  Proprioception: intact  Monofilament testing: intact    Vascular  The right DP pulse is 2+. The right PT pulse is 2+.     Left Foot/Ankle  Left Foot Inspection  Skin Exam: skin normal and skin intact. No dry skin, no warmth, no erythema, no maceration, normal color, no pre-ulcer, no ulcer and no callus.     Toe Exam: ROM and strength within normal limits.     Sensory   Vibration: intact  Proprioception: intact  Monofilament testing: intact    Vascular  The left DP pulse is 2+. The left PT pulse is 2+.     Assign Risk Category  No deformity present  No loss of protective sensation  No weak pulses  Risk: 0

## 2025-02-21 NOTE — ASSESSMENT & PLAN NOTE
Patient previously weighed around 500 pounds at her heaviest.  She has had some success with weight loss but appears to have plateaued around 310 to 330 pounds  -She is a candidate for pharmacotherapy.  Previously sent for Qsymia which I do not believe was covered by insurance.  I would prefer that she try a GLP-1 RA especially with her concurrent diabetes however she does not like needles as above.  After discussion, we will prescribe separate generic medications for phentermine and topiramate  -Continue to monitor for adverse effects.  She has had some issues with high BP at home and I stressed that phentermine can potentially raise blood pressure and that this should be closely monitored outside the office   -we will increase atenolol dose as above.     Follow-up in 1 month for recheck    -She is a candidate for pharmacotherapy with BMI greater than 30.  She has not achieved weight loss goals with a trial of at least 3 to 6 months of lifestyle modification  -We reviewed weight loss medication options.  She is most interested in Qsymia  -Start Qsymia.  Counseled on common side effects.  Advised her to check BP and heart rate 3 times weekly at home with phentermine use  -Follow-up in 3 months

## 2025-02-27 DIAGNOSIS — L30.4 INTERTRIGO: ICD-10-CM

## 2025-02-28 RX ORDER — NYSTATIN 100000 [USP'U]/G
POWDER TOPICAL
Qty: 15 G | Refills: 0 | Status: SHIPPED | OUTPATIENT
Start: 2025-02-28

## 2025-03-06 ENCOUNTER — PATIENT MESSAGE (OUTPATIENT)
Dept: GYNECOLOGY | Facility: CLINIC | Age: 45
End: 2025-03-06

## 2025-03-11 DIAGNOSIS — E66.813 CLASS 3 OBESITY: ICD-10-CM

## 2025-03-13 RX ORDER — PHENTERMINE HYDROCHLORIDE 15 MG/1
15 CAPSULE ORAL EVERY MORNING
Qty: 30 CAPSULE | Refills: 0 | Status: SHIPPED | OUTPATIENT
Start: 2025-03-13

## 2025-03-21 ENCOUNTER — OFFICE VISIT (OUTPATIENT)
Dept: INTERNAL MEDICINE CLINIC | Facility: CLINIC | Age: 45
End: 2025-03-21
Payer: MEDICARE

## 2025-03-21 VITALS
OXYGEN SATURATION: 91 % | RESPIRATION RATE: 18 BRPM | TEMPERATURE: 97.5 F | DIASTOLIC BLOOD PRESSURE: 80 MMHG | BODY MASS INDEX: 51.91 KG/M2 | HEIGHT: 63 IN | SYSTOLIC BLOOD PRESSURE: 140 MMHG | HEART RATE: 124 BPM | WEIGHT: 293 LBS

## 2025-03-21 DIAGNOSIS — R22.1 NECK SWELLING: Primary | ICD-10-CM

## 2025-03-21 PROCEDURE — 99213 OFFICE O/P EST LOW 20 MIN: CPT | Performed by: INTERNAL MEDICINE

## 2025-03-21 RX ORDER — PREDNISONE 20 MG/1
20 TABLET ORAL DAILY
Qty: 5 TABLET | Refills: 0 | Status: SHIPPED | OUTPATIENT
Start: 2025-03-21 | End: 2025-03-26

## 2025-03-21 NOTE — PROGRESS NOTES
"Name: Aisha Agee      : 1980      MRN: [id number]  Encounter Provider: Garrick Rodríguez DO  Encounter Date: 3/21/2025   [facility]  :  Medical history of hypertension, anxiety/depression/PTSD, insomnia, migraine, prediabetes   Assessment & Plan  Neck swelling  Reports recent onset of tenderness and swelling in the area behind the right ear.  Tender to palpation, has been affecting her sleep.  No trauma to the area.  She denies any recent cold or flu symptoms.  No recent dental issues or intraoral procedures.  No otorrhea or ear pain.  Symptoms began 2 to 3 days ago.  She has history of migraine and this does not feel like her typical migraine.  She has been applying ice to the area as well as acetaminophen    On exam, she has focal tenderness to palpation in the right posterior auricular and occipital area.  No fluctuance, erythema, or obvious lesions in the area of concern.  Ear exam unremarkable, no evidence of otitis media or otitis externa    Aisha states that she used to get this issue in the past and was told that it was related to gland swelling    Plan:  -Etiology of swelling unclear.  This may be postauricular or occipital lymphadenopathy -possibly reactive?  -I advised her to continue with icing of the area.  We will also treat with a short course of prednisone 20 mg daily for 5 days.  Caution with her underlying diabetes as this can cause hyperglycemia  -If her symptoms persist or worsen, can consider imaging    Orders:  •  predniSONE 20 mg tablet; Take 1 tablet (20 mg total) by mouth daily for 5 days           History of Present Illness   Headache    Review of Systems   Neurological:  Positive for headaches.       Objective   /80 (BP Location: Left arm, Patient Position: Sitting, Cuff Size: Standard)   Pulse (!) 124   Temp 97.5 °F (36.4 °C) (Temporal)   Resp 18   Ht 5' 3\" (1.6 m)   Wt (!) 142 kg (314 lb)   LMP  (LMP Unknown) " Comment: irregular periods  SpO2 91%   BMI 55.62 kg/m²      Physical Exam  HENT:      Right Ear: Tympanic membrane normal. There is no impacted cerumen.      Mouth/Throat:      Mouth: Mucous membranes are moist.      Pharynx: No oropharyngeal exudate.   Musculoskeletal:         General: Swelling and tenderness present.

## 2025-03-23 DIAGNOSIS — I10 ESSENTIAL HYPERTENSION: ICD-10-CM

## 2025-03-24 RX ORDER — OLMESARTAN MEDOXOMIL 5 MG/1
10 TABLET ORAL DAILY
Qty: 180 TABLET | Refills: 1 | OUTPATIENT
Start: 2025-03-24

## 2025-04-02 ENCOUNTER — OFFICE VISIT (OUTPATIENT)
Dept: INTERNAL MEDICINE CLINIC | Facility: CLINIC | Age: 45
End: 2025-04-02
Payer: MEDICARE

## 2025-04-02 VITALS
SYSTOLIC BLOOD PRESSURE: 142 MMHG | HEIGHT: 63 IN | WEIGHT: 293 LBS | RESPIRATION RATE: 18 BRPM | DIASTOLIC BLOOD PRESSURE: 78 MMHG | OXYGEN SATURATION: 96 % | BODY MASS INDEX: 51.91 KG/M2 | TEMPERATURE: 96 F | HEART RATE: 73 BPM

## 2025-04-02 DIAGNOSIS — K08.89 TOOTH PAIN: Primary | ICD-10-CM

## 2025-04-02 PROCEDURE — 99213 OFFICE O/P EST LOW 20 MIN: CPT | Performed by: INTERNAL MEDICINE

## 2025-04-02 RX ORDER — CLINDAMYCIN HYDROCHLORIDE 300 MG/1
300 CAPSULE ORAL 3 TIMES DAILY
Qty: 21 CAPSULE | Refills: 0 | Status: SHIPPED | OUTPATIENT
Start: 2025-04-02 | End: 2025-04-09

## 2025-04-02 RX ORDER — LIDOCAINE HYDROCHLORIDE 20 MG/ML
15 SOLUTION OROPHARYNGEAL 4 TIMES DAILY PRN
Qty: 100 ML | Refills: 0 | Status: SHIPPED | OUTPATIENT
Start: 2025-04-02

## 2025-04-02 NOTE — PROGRESS NOTES
"Name: Aisha Agee      : 1980      MRN: [id number]  Encounter Provider: Garrick Rodríguez DO  Encounter Date: 2025   Encounter department: [facility]  :  Medical history of hypertension, anxiety/depression/PTSD, insomnia, migraine, prediabetes   Assessment & Plan  Tooth pain  Presents today with mouth/tooth pain.  States she broke one of her right upper back teeth on popcorn recently.  On exam, appears she may have broken premolar or molar.  No significant purulence or bleeding appreciated.  Possible mild swelling in the right maxillary area.  She has penicillin allergy but has responded well to clindamycin previously for intraoral infections.  She has appointment coming up with [facility] in 2 weeks    Plan:  -Will treat with clindamycin for 1 week to cover for possible infection  -For pain relief, sent for viscous lidocaine.  If not available, could also try Orajel over-the-counter  -Continue follow-up with oral surgery as directed    Orders:  •  clindamycin (CLEOCIN) 300 MG capsule; Take 1 capsule (300 mg total) by mouth 3 (three) times a day for 7 days  •  Lidocaine Viscous HCl (XYLOCAINE) 2 % mucosal solution; Swish and spit 15 mL 4 (four) times a day as needed for mouth pain or discomfort           History of Present Illness   HPI  Review of Systems    Objective   /78 (BP Location: Left arm, Patient Position: Sitting, Cuff Size: Standard)   Pulse 73   Temp (!) 96 °F (35.6 °C) (Temporal)   Resp 18   Ht 5' 3\" (1.6 m)   Wt (!) 142 kg (313 lb)   LMP  (LMP Unknown) Comment: irregular periods  SpO2 96%   BMI 55.45 kg/m²      Physical Exam  HENT:      Mouth/Throat:      Mouth: Mucous membranes are moist.      Pharynx: No oropharyngeal exudate.         "

## 2025-04-13 DIAGNOSIS — E66.813 CLASS 3 OBESITY: ICD-10-CM

## 2025-04-13 DIAGNOSIS — I10 ESSENTIAL HYPERTENSION: ICD-10-CM

## 2025-04-13 RX ORDER — ATENOLOL 50 MG/1
50 TABLET ORAL 2 TIMES DAILY
Qty: 180 TABLET | Refills: 1 | Status: SHIPPED | OUTPATIENT
Start: 2025-04-13

## 2025-04-14 RX ORDER — PHENTERMINE HYDROCHLORIDE 15 MG/1
15 CAPSULE ORAL EVERY MORNING
Qty: 30 CAPSULE | Refills: 0 | Status: SHIPPED | OUTPATIENT
Start: 2025-04-14

## 2025-04-28 ENCOUNTER — HOSPITAL ENCOUNTER (EMERGENCY)
Facility: HOSPITAL | Age: 45
Discharge: HOME | End: 2025-04-28
Attending: EMERGENCY MEDICINE | Admitting: EMERGENCY MEDICINE
Payer: MEDICARE

## 2025-04-28 ENCOUNTER — OFFICE VISIT (OUTPATIENT)
Dept: INTERNAL MEDICINE CLINIC | Facility: CLINIC | Age: 45
End: 2025-04-28
Payer: MEDICARE

## 2025-04-28 VITALS
DIASTOLIC BLOOD PRESSURE: 67 MMHG | TEMPERATURE: 97.5 F | SYSTOLIC BLOOD PRESSURE: 144 MMHG | RESPIRATION RATE: 18 BRPM | HEART RATE: 71 BPM

## 2025-04-28 VITALS
TEMPERATURE: 97.3 F | SYSTOLIC BLOOD PRESSURE: 142 MMHG | HEIGHT: 63 IN | BODY MASS INDEX: 51.91 KG/M2 | HEART RATE: 77 BPM | OXYGEN SATURATION: 99 % | RESPIRATION RATE: 18 BRPM | DIASTOLIC BLOOD PRESSURE: 90 MMHG | WEIGHT: 293 LBS

## 2025-04-28 DIAGNOSIS — E66.813 CLASS 3 OBESITY: ICD-10-CM
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
## 2025-04-28 DIAGNOSIS — F32.A ANXIETY AND DEPRESSION: ICD-10-CM

## 2025-04-28 DIAGNOSIS — F41.9 ACUTE ANXIETY: ICD-10-CM

## 2025-04-28 DIAGNOSIS — I10 ESSENTIAL HYPERTENSION: Primary | ICD-10-CM

## 2025-04-28 DIAGNOSIS — F41.9 ANXIETY AND DEPRESSION: ICD-10-CM

## 2025-04-28 DIAGNOSIS — R21 RASH: ICD-10-CM

## 2025-04-28 DIAGNOSIS — E11.9 TYPE 2 DIABETES MELLITUS WITHOUT COMPLICATION, WITHOUT LONG-TERM CURRENT USE OF INSULIN (HCC): ICD-10-CM

## 2025-04-28 PROCEDURE — 99214 OFFICE O/P EST MOD 30 MIN: CPT | Performed by: INTERNAL MEDICINE

## 2025-04-28 PROCEDURE — 99284 EMERGENCY DEPT VISIT MOD MDM: CPT | Performed by: EMERGENCY MEDICINE

## 2025-04-28 PROCEDURE — 99281 EMR DPT VST MAYX REQ PHY/QHP: CPT

## 2025-04-28 RX ORDER — LORAZEPAM 0.5 MG/1
0.5 TABLET ORAL 3 TIMES DAILY PRN
Qty: 10 TABLET | Refills: 0 | Status: SHIPPED | OUTPATIENT
Start: 2025-04-28

## 2025-04-28 RX ORDER — BENZOCAINE/MENTHOL 6 MG-10 MG
LOZENGE MUCOUS MEMBRANE
Qty: 15 G | Refills: 0 | Status: SHIPPED | OUTPATIENT
Start: 2025-04-28

## 2025-04-28 NOTE — PROGRESS NOTES
Name: Aisha Agee      : 1980      MRN: [id number]  Encounter Provider: Garrick Rodríguez DO  Encounter Date: 2025   Encounter department: [facility] INTERNAL MEDICINE [place]  :  Medical history of hypertension, anxiety/depression/PTSD, insomnia, migraine, type 2 diabetes    Assessment & Plan  Essential hypertension  Relatively well-controlled, BP slightly elevated today in the office  Continue current regimen for now atenolol 50 mg twice daily         Type 2 diabetes mellitus without complication, without long-term current use of insulin ([facility])    Lab Results   Component Value Date    HGBA1C 7.2 (H) 2025     Current medications: None  Statin: Yes  Albuminuria/ACEi/ARB: No significant microalbuminuria  Retinopathy: Will try to obtain records    Prior A1c slightly above goal.  GLP-1 RA previously suggested, Aisha is not comfortable with needles and not interested in injectable therapy at this time.  She has achieved some weight loss with combination phentermine-topiramate as outlined elsewhere  - Continue to monitor off medications for now, recheck labs prior to follow-up      Orders:  •  Hemoglobin A1C; Future  •  CBC and differential; Future  •  Comprehensive metabolic panel; Future  •  Lipid Panel with Direct LDL reflex; Future    Anxiety and depression  Symptoms appear to be stable overall  Continue amitriptyline       Acute anxiety  Prescription for as needed lorazepam sent to pharmacy. Takes prior to lab draws   Orders:  •  LORazepam (Ativan) 0.5 mg tablet; Take 1 tablet (0.5 mg total) by mouth 3 (three) times a day as needed for anxiety    Rash  Well-circumscribed, circular, erythematous rash of the left lower abdomen on exam.  States that she was using a blanket within the past day or so and felt discomfort in the lower abdomen area.  Subsequently developed area of erythema.  She was seen at the ER earlier today.  She was advised to continue with topical hydrocortisone and Benadryl.  " It is possible she is having local skin reaction to insect bite  - Continue with topical hydrocortisone and Benadryl as needed.  Lower suspicion for cellulitis at this time.  Advised her to let me know if this worsens       Class 3 obesity  Continues to tolerate combination phentermine/topiramate.  She has successfully lost approximately 20 pounds dating back to the start of the year.    Plan:  -Continue phentermine 15 mg daily  -Increase topiramate to 50 mg daily at bedtime  -Continue with healthy dietary and lifestyle choices  -Will reassess at follow-up                History of Present Illness   HPI  Review of Systems    Objective   /90 (BP Location: Left arm, Patient Position: Sitting, Cuff Size: Standard)   Pulse 77   Temp (!) 97.3 °F (36.3 °C) (Temporal)   Resp 18   Ht 5' 3\" (1.6 m)   Wt (!) 142 kg (312 lb)   LMP  (LMP Unknown) Comment: irregular periods  SpO2 99%   BMI 55.27 kg/m²      Physical Exam    "

## 2025-04-28 NOTE — ASSESSMENT & PLAN NOTE
Prescription for as needed lorazepam sent to pharmacy. Takes prior to lab draws   Orders:  •  LORazepam (Ativan) 0.5 mg tablet; Take 1 tablet (0.5 mg total) by mouth 3 (three) times a day as needed for anxiety

## 2025-04-28 NOTE — ASSESSMENT & PLAN NOTE
Lab Results   Component Value Date    HGBA1C 7.2 (H) 01/22/2025     Current medications: None  Statin: Yes  Albuminuria/ACEi/ARB: No significant microalbuminuria  Retinopathy: Will try to obtain records    Prior A1c slightly above goal.  GLP-1 RA previously suggested, Aisha is not comfortable with needles and not interested in injectable therapy at this time.  She has achieved some weight loss with combination phentermine-topiramate as outlined elsewhere  - Continue to monitor off medications for now, recheck labs prior to follow-up      Orders:  •  Hemoglobin A1C; Future  •  CBC and differential; Future  •  Comprehensive metabolic panel; Future  •  Lipid Panel with Direct LDL reflex; Future

## 2025-04-28 NOTE — ASSESSMENT & PLAN NOTE
Continues to tolerate combination phentermine/topiramate.  She has successfully lost approximately 20 pounds dating back to the start of the year.    Plan:  -Continue phentermine 15 mg daily  -Increase topiramate to 50 mg daily at bedtime  -Continue with healthy dietary and lifestyle choices  -Will reassess at follow-up

## 2025-04-28 NOTE — DISCHARGE INSTRUCTIONS
You have been prescribed hydrocortisone cream, apply as directed until your symptoms improve.     Call your doctor or return to the ER with any concerning symptoms.

## 2025-04-28 NOTE — ASSESSMENT & PLAN NOTE
Relatively well-controlled, BP slightly elevated today in the office  Continue current regimen for now atenolol 50 mg twice daily

## 2025-05-01 ENCOUNTER — PATIENT MESSAGE (OUTPATIENT)
Dept: INTERNAL MEDICINE CLINIC | Facility: CLINIC | Age: 45
End: 2025-05-01

## 2025-05-01 DIAGNOSIS — L03.311 CELLULITIS OF ABDOMINAL WALL: Primary | ICD-10-CM

## 2025-05-01 RX ORDER — CLINDAMYCIN HYDROCHLORIDE 300 MG/1
300 CAPSULE ORAL 3 TIMES DAILY
Qty: 21 CAPSULE | Refills: 0 | Status: SHIPPED | OUTPATIENT
Start: 2025-05-01 | End: 2025-05-08

## 2025-05-02 NOTE — ED ATTENDING ATTESTATION
4/28/2025  I, Saranya Arana DO, saw and evaluated the patient. I have discussed the patient with the resident/non-physician practitioner and agree with the resident's/non-physician practitioner's findings, Plan of Care, and MDM as documented in the resident's/non-physician practitioner's note, except where noted. All available labs and Radiology studies were reviewed.  I was present for key portions of any procedure(s) performed by the resident/non-physician practitioner and I was immediately available to provide assistance.       At this point I agree with the current assessment done in the Emergency Department.  I have conducted an independent evaluation of this patient a history and physical is as follows:    45-year-old female presents with insect bite to her lower left abdomen.  Patient states she was getting her comforter ready for bed and felt something bite her.  Noticed initially a small red matty that has increased in size.  Denies other complaints.  On exam-no acute distress, heart regular, no respiratory distress, erythematous area on the left lower abdominal wall.  Plan-suspect local reaction to insect bite, treat with steroid cream and return precautions    ED Course         Critical Care Time  Procedures

## 2025-05-02 NOTE — ED PROVIDER NOTES
"Time reflects when diagnosis was documented in both MDM as applicable and the Disposition within this note       Time User Action Codes Description Comment    4/28/2025  2:31 AM Silver Trimble Add [W57.XXXA] Insect bite           ED Disposition       ED Disposition   Discharge    Condition   Stable    Date/Time   Mon Apr 28, 2025  2:31 AM    Comment   Aisha Agee discharge to home/self care.                   Assessment & Plan       Medical Decision Making  Previous records reviewed.    /67 (BP Location: Left arm)   Pulse 71   Temp 97.5 °F (36.4 °C) (Temporal)   Resp 18   LMP  (LMP Unknown) Comment: irregular periods  OB Status Postmenopausal   Smoking Status Never  .     DDX includes but not limited to: localized rxn     Upon re-evaluation, no new sxs     Disposition:   I reviewed results with patient who expressed understanding and agrees with plan for local symptomatic relief with steroid cream.   Return precaution discussed.  All questions were answered at this time.  Pt given necessary referrals with instructions to follow up.      Portions of the record may have been created with voice recognition software. Occasional wrong word or \"sound a like\" substitutions may have occurred due to the inherent limitations of voice recognition software. Read the chart carefully and recognize, using context, where substitutions have occurred.              Medications - No data to display    ED Risk Strat Scores                    No data recorded        SBIRT 20yo+      Flowsheet Row Most Recent Value   Initial Alcohol Screen: US AUDIT-C     1. How often do you have a drink containing alcohol? 0 Filed at: 04/28/2025 0150   2. How many drinks containing alcohol do you have on a typical day you are drinking?  0 Filed at: 04/28/2025 0150   3b. FEMALE Any Age, or MALE 65+: How often do you have 4 or more drinks on one occassion? 0 Filed at: 04/28/2025 0150   Audit-C Score 0 Filed at: 04/28/2025 0150   AVIS: How " many times in the past year have you...    Used an illegal drug or used a prescription medication for non-medical reasons? Never Filed at: 2025 0150                            History of Present Illness       Chief Complaint   Patient presents with    Insect Bite     On belly about 10 minutes ago.        Past Medical History:   Diagnosis Date    Allergic     Asthma     seasonal asthma    Essential hypertension 3/17/2023    Insomnia     Migraine     Neuromuscular disorder (HCC)     RLS with PLMD    Restless leg syndrome     Type 2 diabetes mellitus without complication, without long-term current use of insulin ([facility]) 3/5/2023      Past Surgical History:   Procedure Laterality Date    ADENOIDECTOMY       SECTION      CO EXCISION GANGLION WRIST DORSAL/VOLAR PRIMARY Right 2017    Procedure: WRIST EXCISION OF VOLAR GANGLION CYST ;  Surgeon: Geo Cho MD;  Location: [facility];  Service: Orthopedics    TONSILECTOMY AND ADNOIDECTOMY        No family history on file.   Social History     Tobacco Use    Smoking status: Never    Smokeless tobacco: Never   Vaping Use    Vaping status: Never Used   Substance Use Topics    Alcohol use: No    Drug use: No      E-Cigarette/Vaping    E-Cigarette Use Never User       E-Cigarette/Vaping Substances    Nicotine No     THC No     CBD No     Flavoring No     Other No     Unknown No       I have reviewed and agree with the history as documented.     44 yo F presents for evaluation of insect bite to lower left abdomen.  Patient states she was getting her comforter ready for bed and felt an insect bite her abdomen.  She noticed a small red matty initially slowly increased in diameter.  She was unable to visualize or find the insect after the bite.  No shortness of breath, fever/chills, chest pain, N/V        Review of Systems        Objective       ED Triage Vitals [25 0149]   Temperature Pulse Blood Pressure Respirations SpO2 Patient Position - Orthostatic VS    97.5 °F (36.4 °C) 71 144/67 18 -- Sitting      Temp Source Heart Rate Source BP Location FiO2 (%) Pain Score    Temporal -- Left arm -- 10 - Worst Possible Pain      Vitals      Date and Time Temp Pulse SpO2 Resp BP Pain Score FACES Pain Rating User   04/28/25 0149 97.5 °F (36.4 °C) 71 -- 18 144/67 10 - Worst Possible Pain -- CC            Physical Exam  Constitutional:       General: She is not in acute distress.     Appearance: Normal appearance. She is obese.   HENT:      Head: Normocephalic and atraumatic.      Right Ear: External ear normal.      Left Ear: External ear normal.      Nose: Nose normal.      Mouth/Throat:      Mouth: Mucous membranes are moist.      Pharynx: Oropharynx is clear.   Eyes:      Extraocular Movements: Extraocular movements intact.      Conjunctiva/sclera: Conjunctivae normal.      Pupils: Pupils are equal, round, and reactive to light.   Cardiovascular:      Rate and Rhythm: Normal rate and regular rhythm.      Pulses: Normal pulses.      Heart sounds: Normal heart sounds.   Pulmonary:      Effort: Pulmonary effort is normal.      Breath sounds: Normal breath sounds.   Abdominal:      Palpations: Abdomen is soft.      Tenderness: There is no abdominal tenderness.          Comments: ~1 cm in diameter macular erythema   Musculoskeletal:         General: Normal range of motion.      Cervical back: Normal range of motion.   Skin:     General: Skin is warm and dry.      Capillary Refill: Capillary refill takes less than 2 seconds.   Neurological:      General: No focal deficit present.      Mental Status: She is alert and oriented to person, place, and time.         Results Reviewed       None            No orders to display       Procedures    ED Medication and Procedure Management   Prior to Admission Medications   Prescriptions Last Dose Informant Patient Reported? Taking?   Lidocaine Viscous HCl (XYLOCAINE) 2 % mucosal solution   No No   Sig: Swish and spit 15 mL 4 (four) times a day  as needed for mouth pain or discomfort   Multiple Vitamin (MULTIVITAMIN) tablet  Self Yes No   Sig: Take 1 tablet by mouth daily   SUMAtriptan (Imitrex) 100 mg tablet  Self No No   Sig: Take 1 tablet (100 mg total) by mouth if needed for migraine Take at the onset of migraine; if symptoms continue or return, may take another dose at least 2 hours after first dose. Take no more than 2 doses in a day.   albuterol (2.5 mg/3 mL) 0.083 % nebulizer solution   No No   Sig: Take 3 mL (2.5 mg total) by nebulization every 6 (six) hours as needed for wheezing or shortness of breath   albuterol (PROVENTIL HFA,VENTOLIN HFA) 90 mcg/act inhaler   No No   Sig: INHALE 2 PUFFS EVERY 4 HOURS AS NEEDED FOR WHEEZING   amitriptyline (ELAVIL) 75 mg tablet   No No   Sig: Take 1 tablet (75 mg total) by mouth daily at bedtime   atenolol (TENORMIN) 50 mg tablet   No No   Sig: Take 1 tablet (50 mg total) by mouth 2 (two) times a day   furosemide (LASIX) 20 mg tablet   No No   Sig: Take 1 tablet (20 mg total) by mouth daily as needed (For leg swelling)   hydrOXYzine HCL (ATARAX) 25 mg tablet  Self No No   Sig: Take 1 tablet (25 mg total) by mouth every 6 (six) hours as needed for itching   medroxyPROGESTERone (PROVERA) 10 mg tablet   No No   Sig: Take 1 tablet (10 mg total) by mouth daily for 10 days   nystatin powder   No No   Sig: APPLY 3 TIMES A DAY   phentermine 15 MG capsule   No No   Sig: Take 1 capsule (15 mg total) by mouth every morning   prochlorperazine (COMPAZINE) 10 mg tablet   No No   Sig: Take 1 tablet (10 mg total) by mouth every 8 (eight) hours as needed for nausea or vomiting (Migraine)   rosuvastatin (CRESTOR) 20 MG tablet   No No   Sig: Take 1 tablet (20 mg total) by mouth daily   topiramate (Topamax) 50 MG tablet   No No   Sig: Take 1 tablet (50 mg total) by mouth daily at bedtime   traZODone (DESYREL) 50 mg tablet   No No   Sig: Take 1 tablet (50 mg total) by mouth daily at bedtime   zolpidem (AMBIEN) 5 mg tablet   No No    Sig: Take 1 tablet (5 mg total) by mouth daily at bedtime as needed for sleep      Facility-Administered Medications: None     Discharge Medication List as of 4/28/2025  2:32 AM        START taking these medications    Details   hydrocortisone 1 % cream Apply to affected area 2 times daily, Normal           CONTINUE these medications which have NOT CHANGED    Details   albuterol (2.5 mg/3 mL) 0.083 % nebulizer solution Take 3 mL (2.5 mg total) by nebulization every 6 (six) hours as needed for wheezing or shortness of breath, Starting Tue 6/18/2024, Normal      albuterol (PROVENTIL HFA,VENTOLIN HFA) 90 mcg/act inhaler INHALE 2 PUFFS EVERY 4 HOURS AS NEEDED FOR WHEEZING, Starting Wed 2/19/2025, Normal      amitriptyline (ELAVIL) 75 mg tablet Take 1 tablet (75 mg total) by mouth daily at bedtime, Starting Thu 10/31/2024, Normal      atenolol (TENORMIN) 50 mg tablet Take 1 tablet (50 mg total) by mouth 2 (two) times a day, Starting Sun 4/13/2025, Normal      furosemide (LASIX) 20 mg tablet Take 1 tablet (20 mg total) by mouth daily as needed (For leg swelling), Starting Thu 10/31/2024, Normal      hydrOXYzine HCL (ATARAX) 25 mg tablet Take 1 tablet (25 mg total) by mouth every 6 (six) hours as needed for itching, Starting Tue 7/25/2023, Normal      Lidocaine Viscous HCl (XYLOCAINE) 2 % mucosal solution Swish and spit 15 mL 4 (four) times a day as needed for mouth pain or discomfort, Starting Wed 4/2/2025, Normal      medroxyPROGESTERone (PROVERA) 10 mg tablet Take 1 tablet (10 mg total) by mouth daily for 10 days, Starting Tue 1/28/2025, Until Fri 3/21/2025, Normal      Multiple Vitamin (MULTIVITAMIN) tablet Take 1 tablet by mouth daily, Historical Med      nystatin powder APPLY 3 TIMES A DAY, Normal      phentermine 15 MG capsule Take 1 capsule (15 mg total) by mouth every morning, Starting Mon 4/14/2025, Normal      prochlorperazine (COMPAZINE) 10 mg tablet Take 1 tablet (10 mg total) by mouth every 8 (eight) hours  as needed for nausea or vomiting (Migraine), Starting Fri 12/13/2024, Normal      rosuvastatin (CRESTOR) 20 MG tablet Take 1 tablet (20 mg total) by mouth daily, Starting Thu 1/23/2025, Normal      SUMAtriptan (Imitrex) 100 mg tablet Take 1 tablet (100 mg total) by mouth if needed for migraine Take at the onset of migraine; if symptoms continue or return, may take another dose at least 2 hours after first dose. Take no more than 2 doses in a day., Starting Mon 11/13/2023, Normal      topiramate (Topamax) 50 MG tablet Take 1 tablet (50 mg total) by mouth daily at bedtime, Starting Fri 2/21/2025, Normal      traZODone (DESYREL) 50 mg tablet Take 1 tablet (50 mg total) by mouth daily at bedtime, Starting Wed 11/27/2024, Normal      zolpidem (AMBIEN) 5 mg tablet Take 1 tablet (5 mg total) by mouth daily at bedtime as needed for sleep, Starting Mon 2/17/2025, Normal      LORazepam (Ativan) 0.5 mg tablet Take 1 tablet (0.5 mg total) by mouth 3 (three) times a day as needed for anxiety, Starting Wed 1/15/2025, Normal           No discharge procedures on file.  ED SEPSIS DOCUMENTATION   Time reflects when diagnosis was documented in both MDM as applicable and the Disposition within this note       Time User Action Codes Description Comment    4/28/2025  2:31 AM Silver Trimble Add [W57.XXXA] Insect bite                  Silver Trimble MD  05/01/25 2011

## 2025-05-05 DIAGNOSIS — L30.4 INTERTRIGO: ICD-10-CM

## 2025-05-05 DIAGNOSIS — F51.01 PRIMARY INSOMNIA: ICD-10-CM

## 2025-05-05 DIAGNOSIS — R60.0 PERIPHERAL EDEMA: ICD-10-CM

## 2025-05-06 RX ORDER — ZOLPIDEM TARTRATE 5 MG/1
5 TABLET ORAL
Qty: 30 TABLET | Refills: 0 | Status: SHIPPED | OUTPATIENT
Start: 2025-05-06

## 2025-05-06 RX ORDER — HYDROXYZINE HYDROCHLORIDE 25 MG/1
25 TABLET, FILM COATED ORAL EVERY 6 HOURS PRN
Qty: 60 TABLET | Refills: 0 | Status: SHIPPED | OUTPATIENT
Start: 2025-05-06

## 2025-05-09 DIAGNOSIS — J45.909 ASTHMA, UNSPECIFIED ASTHMA SEVERITY, UNSPECIFIED WHETHER COMPLICATED, UNSPECIFIED WHETHER PERSISTENT: ICD-10-CM

## 2025-05-10 RX ORDER — ALBUTEROL SULFATE 0.83 MG/ML
2.5 SOLUTION RESPIRATORY (INHALATION) EVERY 6 HOURS PRN
Qty: 180 ML | Refills: 0 | Status: SHIPPED | OUTPATIENT
Start: 2025-05-10

## 2025-05-23 ENCOUNTER — TELEPHONE (OUTPATIENT)
Dept: ADMINISTRATIVE | Facility: OTHER | Age: 45
End: 2025-05-23

## 2025-05-23 NOTE — TELEPHONE ENCOUNTER
----- Message from Linda WOOD sent at 5/22/2025 11:05 AM EDT -----  Regarding: care gap request  05/22/25 11:05 Lashaun, our patient attached above has had Diabetic Eye Exam completed/performed. Please assist in updating the patient chart by pulling the document from the Media Tab. The date of service is 02/02/25. Thank you,Linda Dye [facility] [place]

## 2025-05-27 DIAGNOSIS — G43.719 INTRACTABLE CHRONIC MIGRAINE WITHOUT AURA AND WITHOUT STATUS MIGRAINOSUS: ICD-10-CM

## 2025-05-27 RX ORDER — PROCHLORPERAZINE MALEATE 10 MG
10 TABLET ORAL EVERY 8 HOURS PRN
Qty: 90 TABLET | Refills: 1 | Status: SHIPPED | OUTPATIENT
Start: 2025-05-27

## 2025-06-04 ENCOUNTER — OFFICE VISIT (OUTPATIENT)
Dept: INTERNAL MEDICINE CLINIC | Facility: CLINIC | Age: 45
End: 2025-06-04
Payer: MEDICARE

## 2025-06-04 ENCOUNTER — RESULTS FOLLOW-UP (OUTPATIENT)
Dept: INTERNAL MEDICINE CLINIC | Facility: CLINIC | Age: 45
End: 2025-06-04

## 2025-06-04 VITALS
BODY MASS INDEX: 51.91 KG/M2 | HEART RATE: 78 BPM | OXYGEN SATURATION: 93 % | SYSTOLIC BLOOD PRESSURE: 120 MMHG | TEMPERATURE: 97.8 F | RESPIRATION RATE: 18 BRPM | HEIGHT: 63 IN | DIASTOLIC BLOOD PRESSURE: 80 MMHG | WEIGHT: 293 LBS

## 2025-06-04 DIAGNOSIS — E11.9 TYPE 2 DIABETES MELLITUS WITHOUT COMPLICATION, WITHOUT LONG-TERM CURRENT USE OF INSULIN (HCC): ICD-10-CM

## 2025-06-04 DIAGNOSIS — M25.562 ACUTE PAIN OF LEFT KNEE: Primary | ICD-10-CM

## 2025-06-04 DIAGNOSIS — E66.813 CLASS 3 OBESITY: ICD-10-CM

## 2025-06-04 LAB
LEFT EYE DIABETIC RETINOPATHY: NORMAL
LEFT EYE IMAGE QUALITY: NORMAL
LEFT EYE MACULAR EDEMA: NORMAL
LEFT EYE OTHER RETINOPATHY: NORMAL
RIGHT EYE DIABETIC RETINOPATHY: NORMAL
RIGHT EYE IMAGE QUALITY: NORMAL
RIGHT EYE MACULAR EDEMA: NORMAL
RIGHT EYE OTHER RETINOPATHY: NORMAL
SEVERITY (EYE EXAM): NORMAL

## 2025-06-04 PROCEDURE — 92250 FUNDUS PHOTOGRAPHY W/I&R: CPT | Performed by: INTERNAL MEDICINE

## 2025-06-04 PROCEDURE — 99214 OFFICE O/P EST MOD 30 MIN: CPT | Performed by: INTERNAL MEDICINE

## 2025-06-04 RX ORDER — METHYLPREDNISOLONE 4 MG/1
TABLET ORAL
Qty: 21 EACH | Refills: 0 | Status: SHIPPED | OUTPATIENT
Start: 2025-06-04

## 2025-06-04 NOTE — PROGRESS NOTES
"Name: Aisha Agee      : 1980      MRN: [id number]  Encounter Provider: Garrick Rodríguez DO  Encounter Date: 2025   Encounter [facility]  :  Medical history of hypertension, anxiety/depression/PTSD, insomnia, migraine, type 2 diabetes  Assessment & Plan  Acute pain of left knee  Onset of left knee pain approximately 1 week ago.  Located medial and inferior to the patella.  She will intermittently notice a \"black and blue\" matty in the area which will receded over the course of the day.  No ecchymosis or skin lesions appreciated on exam today.  States she can have throbbing pain all night.  No inciting injuries or trauma.  There does not appear to be significant effusion on exam.  No significant abnormalities with provocative testing.  Has upcoming graduation and will need to be doing lots of walking    Plan:  -Will check x-ray of the knee for further evaluation.  Symptoms possibly in part related to pes anserine bursitis?  - Continue with supportive care, activity as tolerated.  Will treat with course of Medrol Dosepak  -If symptoms do not improve, can consider course of PT    Orders:  •  XR knee 3 vw left non injury; Future  •  methylPREDNISolone 4 MG tablet therapy pack; Use as directed on package    Type 2 diabetes mellitus without complication, without long-term current use of insulin ([facility])    Lab Results   Component Value Date    HGBA1C 7.2 (H) 2025   Diabetic eye exam completed today    Orders:  •  IRIS Diabetic eye exam           History of Present Illness   HPI  Review of Systems    Objective   /80 (BP Location: Left arm, Patient Position: Sitting, Cuff Size: Standard)   Pulse 78   Temp 97.8 °F (36.6 °C) (Temporal)   Resp 18   Ht 5' 3\" (1.6 m)   Wt (!) 143 kg (316 lb)   LMP  (LMP Unknown) Comment: irregular periods  SpO2 93%   BMI 55.98 kg/m²      Physical Exam    Musculoskeletal:         General: No tenderness or deformity.      " Right lower leg: No edema.      Left lower leg: No edema.

## 2025-06-04 NOTE — ASSESSMENT & PLAN NOTE
Lab Results   Component Value Date    HGBA1C 7.2 (H) 01/22/2025   Diabetic eye exam completed today    Orders:  •  IRIS Diabetic eye exam

## 2025-06-05 RX ORDER — PHENTERMINE HYDROCHLORIDE 15 MG/1
15 CAPSULE ORAL EVERY MORNING
Qty: 30 CAPSULE | Refills: 0 | Status: SHIPPED | OUTPATIENT
Start: 2025-06-05

## 2025-06-05 RX ORDER — TOPIRAMATE 50 MG/1
50 TABLET, FILM COATED ORAL
Qty: 90 TABLET | Refills: 0 | Status: SHIPPED | OUTPATIENT
Start: 2025-06-05

## 2025-06-06 NOTE — TELEPHONE ENCOUNTER
Upon review of the In Basket request we have found/obtained the documentation. After careful review of the document we are unable to complete this request for Diabetic Eye Exam because the documentation does not have the proper verbiage (wording) needed to close the requested care gap(s).    Any additional questions or concerns should be emailed to the Practice Liaisons via the appropriate education email address, please do not reply via In Basket.    Thank you  Keya Mac   [facility]

## 2025-06-09 ENCOUNTER — HOSPITAL ENCOUNTER (OUTPATIENT)
Dept: RADIOLOGY | Facility: HOSPITAL | Age: 45
Discharge: HOME | End: 2025-06-09
Payer: MEDICARE

## 2025-06-09 ENCOUNTER — APPOINTMENT (OUTPATIENT)
Dept: RADIOLOGY | Facility: HOSPITAL | Age: 45
End: 2025-06-09
Payer: MEDICARE

## 2025-06-09 DIAGNOSIS — M25.562 ACUTE PAIN OF LEFT KNEE: ICD-10-CM

## 2025-06-09 PROCEDURE — 73562 X-RAY EXAM OF KNEE 3: CPT

## 2025-06-17 DIAGNOSIS — L30.4 INTERTRIGO: ICD-10-CM

## 2025-06-17 RX ORDER — NYSTATIN 100000 [USP'U]/G
POWDER TOPICAL 3 TIMES DAILY
Qty: 60 G | Refills: 0 | Status: SHIPPED | OUTPATIENT
Start: 2025-06-17

## 2025-07-22 ENCOUNTER — TELEPHONE (OUTPATIENT)
Dept: INTERNAL MEDICINE CLINIC | Facility: CLINIC | Age: 45
End: 2025-07-22

## 2025-07-22 DIAGNOSIS — L30.4 INTERTRIGO: ICD-10-CM

## 2025-07-22 DIAGNOSIS — R21 RASH: Primary | ICD-10-CM

## 2025-07-22 RX ORDER — HYDROCORTISONE 25 MG/G
CREAM TOPICAL 2 TIMES DAILY
Qty: 28 G | Refills: 0 | Status: SHIPPED | OUTPATIENT
Start: 2025-07-22

## 2025-07-22 RX ORDER — CLOTRIMAZOLE 1 %
CREAM (GRAM) TOPICAL 2 TIMES DAILY
Qty: 85 G | Refills: 0 | Status: SHIPPED | OUTPATIENT
Start: 2025-07-22

## 2025-07-22 NOTE — TELEPHONE ENCOUNTER
Discussed with Aisha over the phone.  Her rash seems to be most consistent with fungal infection/intertrigo versus less likely shingles.  Rash has not responded to nystatin powder    Recommend discontinuation of nystatin powder, will treat with combination of topical clotrimazole and hydrocortisone 2.5% cream.  She has appointment scheduled next week for reassessment.  She voiced her understanding

## 2025-07-23 ENCOUNTER — APPOINTMENT (OUTPATIENT)
Dept: LAB | Facility: HOSPITAL | Age: 45
End: 2025-07-23
Payer: MEDICARE

## 2025-07-23 DIAGNOSIS — E11.9 TYPE 2 DIABETES MELLITUS WITHOUT COMPLICATION, WITHOUT LONG-TERM CURRENT USE OF INSULIN (HCC): ICD-10-CM

## 2025-07-23 LAB
ALBUMIN SERPL BCG-MCNC: 3.9 G/DL (ref 3.5–5)
ALP SERPL-CCNC: 162 U/L (ref 34–104)
ALT SERPL W P-5'-P-CCNC: 14 U/L (ref 7–52)
ANION GAP SERPL CALCULATED.3IONS-SCNC: 5 MMOL/L (ref 4–13)
AST SERPL W P-5'-P-CCNC: 21 U/L (ref 13–39)
BASOPHILS # BLD AUTO: 0.04 THOUSANDS/ÂΜL (ref 0–0.1)
BASOPHILS NFR BLD AUTO: 1 % (ref 0–1)
BILIRUB SERPL-MCNC: 0.95 MG/DL (ref 0.2–1)
BUN SERPL-MCNC: 10 MG/DL (ref 5–25)
CALCIUM SERPL-MCNC: 9.2 MG/DL (ref 8.4–10.2)
CHLORIDE SERPL-SCNC: 106 MMOL/L (ref 96–108)
CHOLEST SERPL-MCNC: 142 MG/DL (ref ?–200)
CO2 SERPL-SCNC: 25 MMOL/L (ref 21–32)
CREAT SERPL-MCNC: 0.66 MG/DL (ref 0.6–1.3)
EOSINOPHIL # BLD AUTO: 0.18 THOUSAND/ÂΜL (ref 0–0.61)
EOSINOPHIL NFR BLD AUTO: 2 % (ref 0–6)
ERYTHROCYTE [DISTWIDTH] IN BLOOD BY AUTOMATED COUNT: 13.4 % (ref 11.6–15.1)
EST. AVERAGE GLUCOSE BLD GHB EST-MCNC: 160 MG/DL
GFR SERPL CREATININE-BSD FRML MDRD: 106 ML/MIN/1.73SQ M
GLUCOSE P FAST SERPL-MCNC: 139 MG/DL (ref 65–99)
HBA1C MFR BLD: 7.2 %
HCT VFR BLD AUTO: 42.8 % (ref 34.8–46.1)
HDLC SERPL-MCNC: 43 MG/DL
HGB BLD-MCNC: 14 G/DL (ref 11.5–15.4)
IMM GRANULOCYTES # BLD AUTO: 0.03 THOUSAND/UL (ref 0–0.2)
IMM GRANULOCYTES NFR BLD AUTO: 0 % (ref 0–2)
LDLC SERPL CALC-MCNC: 79 MG/DL (ref 0–100)
LYMPHOCYTES # BLD AUTO: 2.37 THOUSANDS/ÂΜL (ref 0.6–4.47)
LYMPHOCYTES NFR BLD AUTO: 29 % (ref 14–44)
MCH RBC QN AUTO: 27.5 PG (ref 26.8–34.3)
MCHC RBC AUTO-ENTMCNC: 32.7 G/DL (ref 31.4–37.4)
MCV RBC AUTO: 84 FL (ref 82–98)
MONOCYTES # BLD AUTO: 0.5 THOUSAND/ÂΜL (ref 0.17–1.22)
MONOCYTES NFR BLD AUTO: 6 % (ref 4–12)
NEUTROPHILS # BLD AUTO: 5.13 THOUSANDS/ÂΜL (ref 1.85–7.62)
NEUTS SEG NFR BLD AUTO: 62 % (ref 43–75)
NRBC BLD AUTO-RTO: 0 /100 WBCS
PLATELET # BLD AUTO: 307 THOUSANDS/UL (ref 149–390)
PMV BLD AUTO: 9.2 FL (ref 8.9–12.7)
POTASSIUM SERPL-SCNC: 4.9 MMOL/L (ref 3.5–5.3)
PROT SERPL-MCNC: 7.3 G/DL (ref 6.4–8.4)
RBC # BLD AUTO: 5.1 MILLION/UL (ref 3.81–5.12)
SODIUM SERPL-SCNC: 136 MMOL/L (ref 135–147)
TRIGL SERPL-MCNC: 101 MG/DL (ref ?–150)
WBC # BLD AUTO: 8.25 THOUSAND/UL (ref 4.31–10.16)

## 2025-07-23 PROCEDURE — 80053 COMPREHEN METABOLIC PANEL: CPT

## 2025-07-23 PROCEDURE — 83036 HEMOGLOBIN GLYCOSYLATED A1C: CPT

## 2025-07-23 PROCEDURE — 80061 LIPID PANEL: CPT

## 2025-07-23 PROCEDURE — 36415 COLL VENOUS BLD VENIPUNCTURE: CPT

## 2025-07-23 PROCEDURE — 85025 COMPLETE CBC W/AUTO DIFF WBC: CPT

## 2025-07-28 ENCOUNTER — OFFICE VISIT (OUTPATIENT)
Dept: INTERNAL MEDICINE CLINIC | Facility: CLINIC | Age: 45
End: 2025-07-28
Payer: MEDICARE

## 2025-07-28 VITALS
RESPIRATION RATE: 18 BRPM | SYSTOLIC BLOOD PRESSURE: 136 MMHG | HEIGHT: 63 IN | HEART RATE: 70 BPM | OXYGEN SATURATION: 95 % | TEMPERATURE: 97.6 F | WEIGHT: 293 LBS | BODY MASS INDEX: 51.91 KG/M2 | DIASTOLIC BLOOD PRESSURE: 80 MMHG

## 2025-07-28 DIAGNOSIS — F51.01 PRIMARY INSOMNIA: ICD-10-CM

## 2025-07-28 DIAGNOSIS — F41.9 ANXIETY AND DEPRESSION: ICD-10-CM

## 2025-07-28 DIAGNOSIS — E78.5 DYSLIPIDEMIA: ICD-10-CM

## 2025-07-28 DIAGNOSIS — I10 ESSENTIAL HYPERTENSION: ICD-10-CM

## 2025-07-28 DIAGNOSIS — F32.A ANXIETY AND DEPRESSION: ICD-10-CM

## 2025-07-28 DIAGNOSIS — E66.813 CLASS 3 OBESITY: Primary | ICD-10-CM

## 2025-07-28 DIAGNOSIS — E11.9 TYPE 2 DIABETES MELLITUS WITHOUT COMPLICATION, WITHOUT LONG-TERM CURRENT USE OF INSULIN (HCC): ICD-10-CM

## 2025-07-28 PROCEDURE — 99214 OFFICE O/P EST MOD 30 MIN: CPT | Performed by: INTERNAL MEDICINE

## 2025-07-28 RX ORDER — TOPIRAMATE 50 MG/1
100 TABLET, FILM COATED ORAL
Start: 2025-07-28

## 2025-07-29 DIAGNOSIS — J45.909 ASTHMA, UNSPECIFIED ASTHMA SEVERITY, UNSPECIFIED WHETHER COMPLICATED, UNSPECIFIED WHETHER PERSISTENT: ICD-10-CM

## 2025-07-29 DIAGNOSIS — E66.813 CLASS 3 OBESITY: ICD-10-CM

## 2025-07-30 ENCOUNTER — TELEPHONE (OUTPATIENT)
Age: 45
End: 2025-07-30

## 2025-07-30 DIAGNOSIS — E11.9 TYPE 2 DIABETES MELLITUS WITHOUT COMPLICATION, WITHOUT LONG-TERM CURRENT USE OF INSULIN (HCC): Primary | ICD-10-CM

## 2025-07-30 RX ORDER — ALBUTEROL SULFATE 90 UG/1
2 INHALANT RESPIRATORY (INHALATION) EVERY 4 HOURS PRN
Qty: 18 G | Refills: 5 | Status: SHIPPED | OUTPATIENT
Start: 2025-07-30

## 2025-07-30 RX ORDER — PHENTERMINE HYDROCHLORIDE 15 MG/1
15 CAPSULE ORAL EVERY MORNING
Qty: 30 CAPSULE | Refills: 0 | Status: SHIPPED | OUTPATIENT
Start: 2025-07-30

## 2025-08-18 ENCOUNTER — OFFICE VISIT (OUTPATIENT)
Dept: INTERNAL MEDICINE CLINIC | Facility: CLINIC | Age: 45
End: 2025-08-18
Payer: MEDICARE

## 2025-08-18 VITALS
HEIGHT: 63 IN | DIASTOLIC BLOOD PRESSURE: 90 MMHG | SYSTOLIC BLOOD PRESSURE: 140 MMHG | TEMPERATURE: 97.1 F | WEIGHT: 293 LBS | HEART RATE: 101 BPM | RESPIRATION RATE: 18 BRPM | BODY MASS INDEX: 51.91 KG/M2 | OXYGEN SATURATION: 94 %

## 2025-08-18 DIAGNOSIS — J01.90 ACUTE BACTERIAL RHINOSINUSITIS: Primary | ICD-10-CM

## 2025-08-18 DIAGNOSIS — B96.89 ACUTE BACTERIAL RHINOSINUSITIS: Primary | ICD-10-CM

## 2025-08-18 PROCEDURE — 99213 OFFICE O/P EST LOW 20 MIN: CPT | Performed by: INTERNAL MEDICINE

## 2025-08-18 RX ORDER — CEFPODOXIME PROXETIL 200 MG/1
200 TABLET, FILM COATED ORAL 2 TIMES DAILY
Qty: 14 TABLET | Refills: 0 | Status: SHIPPED | OUTPATIENT
Start: 2025-08-18 | End: 2025-08-25